# Patient Record
Sex: FEMALE | Race: WHITE | NOT HISPANIC OR LATINO | Employment: PART TIME | URBAN - METROPOLITAN AREA
[De-identification: names, ages, dates, MRNs, and addresses within clinical notes are randomized per-mention and may not be internally consistent; named-entity substitution may affect disease eponyms.]

---

## 2017-01-25 ENCOUNTER — TELEPHONE (OUTPATIENT)
Dept: FAMILY MEDICINE CLINIC | Facility: CLINIC | Age: 62
End: 2017-01-25

## 2017-01-25 NOTE — TELEPHONE ENCOUNTER
----- Message from Corin Ardon sent at 1/25/2017  4:21 PM EST -----  Contact: [phone]   PT SAYS SHE HAS NOT SMOKED SINCE 1998 BUT HER RECORDS IN THIS OFFICE PER HER INSURANCE COMPANY SAYS SHE SMOKES A PACK A DAY.  PATIENT SAYS SHE NEEDS THIS CORRECTED IN HER RECORDS AND TO HAVE THE PROVIDER  CONTACT HER

## 2017-06-21 ENCOUNTER — OFFICE VISIT (OUTPATIENT)
Dept: FAMILY MEDICINE CLINIC | Facility: CLINIC | Age: 62
End: 2017-06-21

## 2017-06-21 VITALS
HEIGHT: 69 IN | DIASTOLIC BLOOD PRESSURE: 74 MMHG | OXYGEN SATURATION: 98 % | BODY MASS INDEX: 26.66 KG/M2 | WEIGHT: 180 LBS | TEMPERATURE: 98.4 F | HEART RATE: 92 BPM | SYSTOLIC BLOOD PRESSURE: 130 MMHG

## 2017-06-21 DIAGNOSIS — L03.313 CELLULITIS OF CHEST WALL: Primary | ICD-10-CM

## 2017-06-21 PROCEDURE — 99213 OFFICE O/P EST LOW 20 MIN: CPT | Performed by: FAMILY MEDICINE

## 2017-06-21 RX ORDER — AMOXICILLIN AND CLAVULANATE POTASSIUM 875; 125 MG/1; MG/1
1 TABLET, FILM COATED ORAL 2 TIMES DAILY
Qty: 14 TABLET | Refills: 0
Start: 2017-06-21 | End: 2017-06-28

## 2017-06-21 NOTE — PROGRESS NOTES
"Subjective   Inez Ladd is a 62 y.o. female.     History of Present Illness   The patient presents today with c/o insect bites of the right upper chest with redness and sone yellow drainage.  She describes these as chiggers.  The redness and soreness around each developed after scratching the central area.  She has since developed a small amount of yellow exudate and surrounding erythema.  No fever or chills.        The following portions of the patient's history were reviewed and updated as appropriate: allergies, current medications, past social history and problem list.    Review of Systems   Constitutional: Negative for chills and fever.   Skin: Positive for color change, rash and wound.       Objective   /74  Pulse 92  Temp 98.4 °F (36.9 °C)  Ht 69\" (175.3 cm)  Wt 180 lb (81.6 kg)  SpO2 98%  BMI 26.58 kg/m2  Physical Exam   Constitutional: She appears well-developed and well-nourished.   Skin:   2 areas of erythema above the right breast.  Each of these are about 2 or 3 inches in diameter and have a centrally excoriated area at the center of each.   Nursing note and vitals reviewed.      Assessment/Plan   Problem List Items Addressed This Visit     None      Visit Diagnoses     Cellulitis of chest wall    -  Primary    right upper chest                  Rx for Augmentin 875 mg twice a day for 7 days.    Follow up if not improving or worsens.      Scribed for Dr Jose Culp by Sujata Kwan CMA.    "

## 2018-02-19 ENCOUNTER — OFFICE VISIT (OUTPATIENT)
Dept: FAMILY MEDICINE CLINIC | Facility: CLINIC | Age: 63
End: 2018-02-19

## 2018-02-19 VITALS
OXYGEN SATURATION: 98 % | WEIGHT: 185 LBS | HEART RATE: 86 BPM | BODY MASS INDEX: 27.4 KG/M2 | HEIGHT: 69 IN | SYSTOLIC BLOOD PRESSURE: 124 MMHG | DIASTOLIC BLOOD PRESSURE: 76 MMHG | RESPIRATION RATE: 16 BRPM | TEMPERATURE: 98.5 F

## 2018-02-19 DIAGNOSIS — H69.82 DYSFUNCTION OF LEFT EUSTACHIAN TUBE: ICD-10-CM

## 2018-02-19 DIAGNOSIS — S83.412A SPRAIN OF MEDIAL COLLATERAL LIGAMENT OF LEFT KNEE, INITIAL ENCOUNTER: Primary | ICD-10-CM

## 2018-02-19 PROCEDURE — 99213 OFFICE O/P EST LOW 20 MIN: CPT | Performed by: FAMILY MEDICINE

## 2018-02-19 RX ORDER — FLUTICASONE PROPIONATE 50 MCG
2 SPRAY, SUSPENSION (ML) NASAL DAILY
Qty: 1 BOTTLE | Refills: 2 | Status: SHIPPED | OUTPATIENT
Start: 2018-02-19 | End: 2019-02-06

## 2018-02-19 NOTE — PROGRESS NOTES
"Subjective   Inez Ladd is a 63 y.o. female.     Knee Pain    The incident occurred 3 to 5 days ago. The incident occurred at home. The injury mechanism was a direct blow (dog jumped out of the car and landed on the left knee). The pain is present in the left knee (back of the left knee). The quality of the pain is described as aching. The pain is moderate. The pain has been improving since onset. Pertinent negatives include no inability to bear weight. She reports no foreign bodies present. The symptoms are aggravated by movement. She has tried NSAIDs for the symptoms. The treatment provided mild relief.   Ear Fullness    There is pain in the left ear. This is a new problem. The current episode started in the past 7 days. There has been no fever. Associated symptoms include hearing loss (mild). Pertinent negatives include no abdominal pain or ear discharge. She has tried nothing for the symptoms.        The following portions of the patient's history were reviewed and updated as appropriate: allergies, current medications, past social history and problem list.    Review of Systems   HENT: Positive for hearing loss (mild). Negative for ear discharge.    Gastrointestinal: Negative for abdominal pain.       Objective   /76  Pulse 86  Temp 98.5 °F (36.9 °C)  Resp 16  Ht 175.3 cm (69\")  Wt 83.9 kg (185 lb)  SpO2 98%  BMI 27.32 kg/m2  Physical Exam   HENT:   Fluid behind TM   Pulmonary/Chest: She has no wheezes. She has no rales.   Musculoskeletal:   Exam of knee reveals tenderness along medial aspect of kneec joint.  Joint stable. No effusioin.       Assessment/Plan   Problem List Items Addressed This Visit     None      Visit Diagnoses     Sprain of medial collateral ligament of left knee, initial encounter    -  Primary    Dysfunction of left eustachian tube                  Drink plenty fluids.  Do straight leg exercises 10 times twice a day.Also some straight leg stretching exercises.    Stay off " work for the next two days. OK to Return to work on Wednesday if improving.    Take the Motrin 3 tablet two to three times a day for the next 3 days.    Use Flonase nasal spray 2 sprays in each nostril daily.    Follow up as needed.              Scribed for Dr Jose Culp by Sujata Kwan CMA.          I, Jose Culp MD, personally performed the services described in this documentation, as scribed by Sujata Kwan in my presence, and is both accurate and complete.

## 2018-02-21 ENCOUNTER — OFFICE VISIT (OUTPATIENT)
Dept: FAMILY MEDICINE CLINIC | Facility: CLINIC | Age: 63
End: 2018-02-21

## 2018-02-21 VITALS
BODY MASS INDEX: 27.4 KG/M2 | DIASTOLIC BLOOD PRESSURE: 68 MMHG | WEIGHT: 185 LBS | OXYGEN SATURATION: 99 % | SYSTOLIC BLOOD PRESSURE: 120 MMHG | HEIGHT: 69 IN | HEART RATE: 87 BPM

## 2018-02-21 DIAGNOSIS — S83.412D SPRAIN OF MEDIAL COLLATERAL LIGAMENT OF LEFT KNEE, SUBSEQUENT ENCOUNTER: Primary | ICD-10-CM

## 2018-02-21 PROBLEM — S83.412A SPRAIN OF MEDIAL COLLATERAL LIGAMENT OF LEFT KNEE: Status: ACTIVE | Noted: 2018-02-21

## 2018-02-21 PROCEDURE — 99213 OFFICE O/P EST LOW 20 MIN: CPT | Performed by: FAMILY MEDICINE

## 2018-02-21 NOTE — PROGRESS NOTES
"Subjective   Inez Ladd is a 63 y.o. female.     History of Present Illness   The patient is here for a follow up on left knee pain.    She states the pain is now in the front of the knee. Also still some tightness in the back of the knee.    She is unable to walk for long distance without pain.    The following portions of the patient's history were reviewed and updated as appropriate: allergies, current medications, past social history and problem list.    Review of Systems   Respiratory: Negative for shortness of breath.    Cardiovascular: Negative for chest pain.   Musculoskeletal: Positive for arthralgias (left knee).       Objective   /68  Pulse 87  Ht 175.3 cm (69.02\")  Wt 83.9 kg (185 lb)  SpO2 99%  BMI 27.31 kg/m2  Physical Exam   Constitutional: She appears well-developed and well-nourished.   Musculoskeletal:   Emanation of the left knee reveals no effusion.  There is some tenderness along the medial aspect of the knee joint which extends into the distal medial thigh and also the proximal medial calf.   Neurological: She has normal reflexes.   Nursing note and vitals reviewed.      Assessment/Plan   Problem List Items Addressed This Visit        Musculoskeletal and Integument    Sprain of medial collateral ligament of left knee - Primary              Drink plenty fluids.  Stay off work until Monday.    Continue Ibuprofen as doing.  Do some straight leg raising exercises twice a day.    Follow up as needed.          Scribed for Dr Jose Culp by Sujata Kwan CMA.          I, Jose Culp MD, personally performed the services described in this documentation, as scribed by Sujata Kwan in my presence, and is both accurate and complete.    "

## 2018-02-26 ENCOUNTER — OFFICE VISIT (OUTPATIENT)
Dept: FAMILY MEDICINE CLINIC | Facility: CLINIC | Age: 63
End: 2018-02-26

## 2018-02-26 ENCOUNTER — HOSPITAL ENCOUNTER (OUTPATIENT)
Dept: GENERAL RADIOLOGY | Facility: HOSPITAL | Age: 63
Discharge: HOME | End: 2018-02-26
Attending: FAMILY MEDICINE | Admitting: FAMILY MEDICINE

## 2018-02-26 VITALS
HEART RATE: 85 BPM | OXYGEN SATURATION: 99 % | HEIGHT: 69 IN | BODY MASS INDEX: 27.4 KG/M2 | SYSTOLIC BLOOD PRESSURE: 138 MMHG | DIASTOLIC BLOOD PRESSURE: 80 MMHG | WEIGHT: 185 LBS

## 2018-02-26 DIAGNOSIS — S76.312D HAMSTRING MUSCLE STRAIN, LEFT, SUBSEQUENT ENCOUNTER: ICD-10-CM

## 2018-02-26 DIAGNOSIS — S76.312D HAMSTRING MUSCLE STRAIN, LEFT, SUBSEQUENT ENCOUNTER: Primary | ICD-10-CM

## 2018-02-26 PROCEDURE — 73560 X-RAY EXAM OF KNEE 1 OR 2: CPT

## 2018-02-26 PROCEDURE — 99213 OFFICE O/P EST LOW 20 MIN: CPT | Performed by: FAMILY MEDICINE

## 2018-03-08 ENCOUNTER — OFFICE VISIT (OUTPATIENT)
Dept: FAMILY MEDICINE CLINIC | Facility: CLINIC | Age: 63
End: 2018-03-08

## 2018-03-08 VITALS
HEART RATE: 91 BPM | DIASTOLIC BLOOD PRESSURE: 66 MMHG | SYSTOLIC BLOOD PRESSURE: 122 MMHG | HEIGHT: 69 IN | BODY MASS INDEX: 27.4 KG/M2 | OXYGEN SATURATION: 98 % | WEIGHT: 185 LBS

## 2018-03-08 DIAGNOSIS — M76.892 HAMSTRING TENDINITIS OF LEFT THIGH: ICD-10-CM

## 2018-03-08 DIAGNOSIS — S83.412D SPRAIN OF MEDIAL COLLATERAL LIGAMENT OF LEFT KNEE, SUBSEQUENT ENCOUNTER: Primary | ICD-10-CM

## 2018-03-08 PROCEDURE — 99213 OFFICE O/P EST LOW 20 MIN: CPT | Performed by: FAMILY MEDICINE

## 2018-03-08 RX ORDER — TRAMADOL HYDROCHLORIDE 50 MG/1
TABLET ORAL
Refills: 0 | COMMUNITY
Start: 2018-02-26 | End: 2018-04-16

## 2018-03-08 NOTE — PROGRESS NOTES
"Subjective   Inez Ladd is a 63 y.o. female.     History of Present Illness   The patient is here for a follow up on Left Hamstring muscle strain and left knee pain.  She was initially evaluated here at the office on February 19.  X-rays of the left knee revealed no bony abnormalities.      States she is doing physical therapy at [facility].  She is being treated for a strain of the medial collateral ligament as well as some tendinitis of the hamstrings medially.  States this is helping some. Still hurts with walking. Still some swelling and tightness in the knee. Improves with massage.  Denies any chest pain or shortness of breath    The following portions of the patient's history were reviewed and updated as appropriate: allergies, current medications, past social history and problem list.    Review of Systems   Respiratory: Negative for shortness of breath.    Cardiovascular: Negative for chest pain.   Musculoskeletal: Positive for arthralgias (left knee).       Objective   /66  Pulse 91  Ht 175.3 cm (69.02\")  Wt 83.9 kg (185 lb)  SpO2 98%  BMI 27.31 kg/m2  Physical Exam   Constitutional: She appears well-developed and well-nourished.   Musculoskeletal:   Semination of the left knee reveals no effusion.  There is no joint line tenderness.  There is some mild tenderness of the distal medial hamstring tendon with some tightness of that tissue.  There is some mild tenderness along the medial aspect of the left knee consistent with the collateral ligament.  The joint is stable.   Nursing note and vitals reviewed.      Assessment/Plan   Problem List Items Addressed This Visit        Musculoskeletal and Integument    Sprain of medial collateral ligament of left knee - Primary      Other Visit Diagnoses     Hamstring tendinitis of left thigh          She still has pain with ambulation.  This is gradually improving with physical therapy and the passage of time.  As she was able to walk at 200 yards but " then had to sit down and rest.  We will have her continue receiving physical therapy 2 or 3 days a week.  I will have her stay off work through March 16 and then can return to part-time work after that.  Part-time work would consist of working 6 hours instead of 12 hours and a shift and she would do this on an every other day basis.  She will continue with physical therapy through the end of March.  We will have her return to see us on the 30th day of March.  Reevaluate at that point.  It is anticipated that she'll be able to return to full-time work after March 31.        Drink plenty fluids.    Continue medications as doing.    Stay off work for one more week then try half shifts every other day for two weeks then go back to full time after April first.    Follow up on March 30.              Scribed for Dr Jose Culp by Sujata Kwan CMA.

## 2018-03-19 ENCOUNTER — OFFICE VISIT (OUTPATIENT)
Dept: FAMILY MEDICINE CLINIC | Facility: CLINIC | Age: 63
End: 2018-03-19

## 2018-03-19 VITALS
HEART RATE: 94 BPM | BODY MASS INDEX: 27.4 KG/M2 | HEIGHT: 69 IN | DIASTOLIC BLOOD PRESSURE: 92 MMHG | SYSTOLIC BLOOD PRESSURE: 140 MMHG | OXYGEN SATURATION: 99 % | WEIGHT: 185 LBS

## 2018-03-19 DIAGNOSIS — M25.562 ACUTE PAIN OF LEFT KNEE: Primary | ICD-10-CM

## 2018-03-19 PROCEDURE — 99213 OFFICE O/P EST LOW 20 MIN: CPT | Performed by: FAMILY MEDICINE

## 2018-03-19 NOTE — PROGRESS NOTES
"Subjective   Inez Ladd is a 63 y.o. female.     History of Present Illness   The patient is here today for a follow up on left knee pain.    States she is now having a sharp pain ( 8 out or 10 on pain scale) in the medial left knee. Worse with movement and walking. Tramadol 50 mg every 6 hours has helped. States the tightness in the back of the knee has resolved.  Denies any chest pain or shortness of breath.    The following portions of the patient's history were reviewed and updated as appropriate: allergies, current medications, past social history and problem list.    Review of Systems    Objective   /92   Pulse 94   Ht 175.3 cm (69.02\")   Wt 83.9 kg (185 lb)   SpO2 99%   BMI 27.31 kg/m²   Physical Exam   Constitutional: She appears well-developed and well-nourished.   Musculoskeletal: She exhibits tenderness (left medial collateral ligamentleft medial collateral ligament).   Examination of the left knee reveals very small effusion.  There is no erythema externally.  There is point tenderness at the medial joint line of the left knee.  The patient has good range of motion with flexion and extension.  There is no locking or slippage.  The knee is stable on exam.   Nursing note and vitals reviewed.      Assessment/Plan   Problem List Items Addressed This Visit     None      Visit Diagnoses     Acute pain of left knee    -  Primary    rule out medial collateral tear        The patient has developed recurring pain of the left knee that seems to be quite severe.  She has pain with ambulation.  The location of the pain suggests the possibility of a medial meniscal tear.  I will refer her to orthopedics for further evaluation to see if MRI can be obtained to rule out the possibility of a cartilaginous injury.  I also filled out today FMLA form for her to be off work thru at least April 15.        Drink plenty fluids.    Use the Motrin 2 tablets 3 times a day with food and Tramadol 50 mg 2-3 times a day " as needed.    Refer to Orthopedics. Dr Jose Calhoun.    Follow up as needed.                Scribed for Dr Jose Culp by Sujata Kwan CMA.          I, Jose Culp MD, personally performed the services described in this documentation, as scribed by Sujata Kwan in my presence, and is both accurate and complete.

## 2018-03-23 ENCOUNTER — OFFICE VISIT (OUTPATIENT)
Dept: ORTHOPEDIC SURGERY | Facility: CLINIC | Age: 63
End: 2018-03-23

## 2018-03-23 VITALS
DIASTOLIC BLOOD PRESSURE: 84 MMHG | SYSTOLIC BLOOD PRESSURE: 147 MMHG | BODY MASS INDEX: 27.4 KG/M2 | HEIGHT: 69 IN | WEIGHT: 184.97 LBS | HEART RATE: 80 BPM

## 2018-03-23 DIAGNOSIS — S76.312A HAMSTRING MUSCLE STRAIN, LEFT, INITIAL ENCOUNTER: Primary | ICD-10-CM

## 2018-03-23 PROCEDURE — 99204 OFFICE O/P NEW MOD 45 MIN: CPT | Performed by: ORTHOPAEDIC SURGERY

## 2018-03-23 RX ORDER — IBUPROFEN 400 MG/1
400 TABLET ORAL EVERY 6 HOURS PRN
COMMUNITY
End: 2018-04-27

## 2018-03-23 NOTE — PROGRESS NOTES
[facility] [place] [facility] Note    Subjective     Chief Complaint   Patient presents with   • Left Knee - Pain     Injury 2/15/18 when her dog slammed into her knee. Got better but increased on 3/17/18.        HPI      Inez Ladd is a 63 y.o. female. She fell and injured her knee February 15, 2018.  Then got worse March 15 and went to the ER.  She is on crutches taking Motrin.  Her pain is 9 out of 10 and the medial aspect of her knee.  She is a nurse at [employer].  She's tried anti-inflammatories and physical therapy.        Past Medical History:   Diagnosis Date   • Acute cystitis    • Acute pharyngitis    • Body aches    • Cellulitis    • Dysuria    • H/O bladder infections     2 SINCE AUGUST, urinary tract infection   • History of prolapse of bladder      Bladder disorder   • Impacted cerumen     Impacted cerumen, unspecified ear    • Infected insect bite of forearm    • Menopause    • Prophylactic immunotherapy       Past Surgical History:   Procedure Laterality Date   • NO PAST SURGERIES        Family History   Problem Relation Age of Onset   • Coronary artery disease Other    • Coronary artery disease Mother    • Hypertension Mother      Social History     Social History   • Marital status:      Spouse name: N/A   • Number of children: N/A   • Years of education: N/A     Occupational History   • Nurse      [employer]     Social History Main Topics   • Smoking status: Former Smoker     Types: Cigarettes     Quit date: 1/1/1998   • Smokeless tobacco: Never Used   • Alcohol use Yes      Comment: Occasional alcohol use    • Drug use: No   • Sexual activity: Defer     Other Topics Concern   • Not on file     Social History Narrative   • No narrative on file      Current Outpatient Prescriptions on File Prior to Visit   Medication Sig Dispense Refill   • fluticasone (FLONASE) 50 MCG/ACT nasal spray 2 sprays into each nostril Daily. Administer 2 sprays in each nostril for each dose. 1  "bottle 2   • traMADol (ULTRAM) 50 MG tablet 1 TABLET 4 TIMES A DAY AS NEEDED FOR PAIN  0     No current facility-administered medications on file prior to visit.       No Known Allergies     The following portions of the patient's history were reviewed and updated as appropriate: allergies, current medications, past family history, past medical history, past social history, past surgical history and problem list.    Review of Systems   Constitutional: Positive for activity change.   HENT: Positive for hearing loss and sinus pressure.    Eyes: Positive for pain.   Respiratory: Negative.    Cardiovascular: Negative.    Gastrointestinal: Negative.    Endocrine: Negative.    Genitourinary: Positive for urgency.   Musculoskeletal: Positive for arthralgias.   Skin: Negative.    Allergic/Immunologic: Negative.    Neurological: Negative.    Hematological: Negative.    Psychiatric/Behavioral: Negative.         Objective      Physical Exam  /84   Pulse 80   Ht 175.3 cm (69.02\")   Wt 83.9 kg (184 lb 15.5 oz)   BMI 27.30 kg/m²     Body mass index is 27.3 kg/m².        GENERAL APPEARANCE: awake, alert & oriented x 3, in no acute distress and well developed, well nourished  PSYCH: normal mood and affect  LUNGS:  breathing nonlabored, no wheezing  EYES: sclera anicteric, pupils equal  CARDIOVASCULAR: palpable pulses dorsalis pedis, palpable posterior tibial bilaterally. Capillary refill less than 2 seconds  INTEGUMENTARY: skin intact, no clubbing, cyanosis  NEUROLOGIC:  abnormal gait and balance, on crutches             Ortho Exam  Peripheral Vascular:    Upper Extremity:   Inspection:  Left--no cyanotic nail beds Right--no cyanotic nail beds   Bilateral:  Pink nail beds with brisk capillary refill   Palpation:  Bilateral radial pulse normal    Musculoskeletal:  Global Assessment:  Overall assessment of Lower Extremity Muscle Strength and Tone:  Left quadriceps--5/5   Left hamstrings--5/5       Left tibialis " anterior--5/5  Left gastroc-soleus--5/5  Left EHL--5/5      Lower Extremity:  Knee/Patella:  No digital clubbing or cyanosis.    Examination of left knee reveals:  Normal deep tendon reflexes, coordination, strength, tone, sensation.  No known fractures or deformities.    Inspection and Palpation:    Left knee: She is unable to bear weight on left knee  Tenderness:  Hamstring tendon insertion with minimal medial joint line tenderness and minimal swelling  Effusion:  none  Crepitus:  none  Pulses:  2+  Ecchymosis:  None  Warmth:  None       ROM:  Right:  Extension:0    Flexion:135  Left:  Extension:0     Flexion:135    Instability:    Left:  Lachman Test:  Negative, Varus stress test negative, Valgus stress test negative   Anterior Drawer Test:  Negative, Posterior Drawer Test:  Negative      Deformities/Malalignments/Discrepancies:    Left:  none  Right:  none    Functional Testing:    Left:  Wilberto's test:  Negative  Patella grind test:  Negative  Q-angle:  Normal  Apprehension Sign:  Negative    Imaging/Studies  Imaging Results (last 7 days)     ** No results found for the last 168 hours. **      I reviewed the x-rays left knee and they're negative.    Assessment/Plan        ICD-10-CM ICD-9-CM   1. Hamstring muscle strain, left, initial encounter S76.312A 843.8       Orders Placed This Encounter   Procedures   • MRI Knee Left Without Contrast   • Ambulatory Referral to Physical Therapy    She will continue the anti-inflammatories and crutches.  She will continue physical therapy and we will get an MRI.  I will see her back after the MRI.  She is unable to bear weight and has giving way of the left knee.  She's failed physical therapy so far.    Medical Decision Making  Management Options : over-the-counter medicine and physical/occupational therapy  Data/Risk: radiology tests and independent visualization of imaging, lab tests, or EMG/NCV    Jose Calhoun MD  03/23/18  9:13 AM

## 2018-03-27 ENCOUNTER — HOSPITAL ENCOUNTER (OUTPATIENT)
Dept: MRI IMAGING | Facility: HOSPITAL | Age: 63
Discharge: HOME | End: 2018-03-27
Attending: ORTHOPAEDIC SURGERY | Admitting: ORTHOPAEDIC SURGERY

## 2018-03-27 DIAGNOSIS — S76.312A HAMSTRING MUSCLE STRAIN, LEFT, INITIAL ENCOUNTER: ICD-10-CM

## 2018-03-27 PROCEDURE — 73721 MRI JNT OF LWR EXTRE W/O DYE: CPT

## 2018-03-30 ENCOUNTER — OFFICE VISIT (OUTPATIENT)
Dept: ORTHOPEDIC SURGERY | Facility: CLINIC | Age: 63
End: 2018-03-30

## 2018-03-30 ENCOUNTER — TELEPHONE (OUTPATIENT)
Dept: ORTHOPEDIC SURGERY | Facility: CLINIC | Age: 63
End: 2018-03-30

## 2018-03-30 VITALS
WEIGHT: 183.86 LBS | DIASTOLIC BLOOD PRESSURE: 91 MMHG | HEART RATE: 79 BPM | SYSTOLIC BLOOD PRESSURE: 154 MMHG | HEIGHT: 69 IN | BODY MASS INDEX: 27.23 KG/M2

## 2018-03-30 DIAGNOSIS — T14.8XXA BONE BRUISE: Primary | ICD-10-CM

## 2018-03-30 DIAGNOSIS — S83.232D COMPLEX TEAR OF MEDIAL MENISCUS OF LEFT KNEE AS CURRENT INJURY, SUBSEQUENT ENCOUNTER: ICD-10-CM

## 2018-03-30 DIAGNOSIS — S83.412D SPRAIN OF MEDIAL COLLATERAL LIGAMENT OF LEFT KNEE, SUBSEQUENT ENCOUNTER: ICD-10-CM

## 2018-03-30 PROCEDURE — 99213 OFFICE O/P EST LOW 20 MIN: CPT | Performed by: ORTHOPAEDIC SURGERY

## 2018-03-30 NOTE — PROGRESS NOTES
[facility] [place] [facility] [place]    Subjective     Chief Complaint   Patient presents with   • Left Knee - Follow-up     1 week f/u post MRI  Hamstring muscle strain, left, initial encounter        HPI      Inez Ladd is a 63 y.o. female.  She had a fall February 15, 2018.  She is here follow-up after the MRI.  She says she slightly better and her pain is 4 out of 10.  She goes to physical therapy where she works a [employer].  She is on anti-inflammatories        Past Medical History:   Diagnosis Date   • Acute cystitis    • Acute pharyngitis    • Body aches    • Cellulitis    • Dysuria    • H/O bladder infections     2 SINCE AUGUST, urinary tract infection   • History of prolapse of bladder      Bladder disorder   • Impacted cerumen     Impacted cerumen, unspecified ear    • Infected insect bite of forearm    • Menopause    • Prophylactic immunotherapy       Past Surgical History:   Procedure Laterality Date   • NO PAST SURGERIES        Family History   Problem Relation Age of Onset   • Coronary artery disease Other    • Coronary artery disease Mother    • Hypertension Mother    • No Known Problems Father      Social History     Social History   • Marital status:      Spouse name: N/A   • Number of children: N/A   • Years of education: N/A     Occupational History   • Nurse      [employer]     Social History Main Topics   • Smoking status: Former Smoker     Types: Cigarettes     Quit date: 1/1/1998   • Smokeless tobacco: Never Used   • Alcohol use Yes      Comment: Occasional alcohol use    • Drug use: No   • Sexual activity: Defer     Other Topics Concern   • Not on file     Social History Narrative   • No narrative on file      Current Outpatient Prescriptions on File Prior to Visit   Medication Sig Dispense Refill   • fluticasone (FLONASE) 50 MCG/ACT nasal spray 2 sprays into each nostril Daily. Administer 2 sprays in each nostril for each dose. 1 bottle 2   • ibuprofen (ADVIL,MOTRIN)  "400 MG tablet Take 400 mg by mouth Every 6 (Six) Hours As Needed for Mild Pain .     • traMADol (ULTRAM) 50 MG tablet 1 TABLET 4 TIMES A DAY AS NEEDED FOR PAIN  0     No current facility-administered medications on file prior to visit.       No Known Allergies     The following portions of the patient's history were reviewed and updated as appropriate: allergies, current medications, past family history, past medical history, past social history, past surgical history and problem list.    Review of Systems   Constitutional: Positive for activity change.   HENT: Positive for hearing loss.    Eyes: Negative.    Respiratory: Negative.    Cardiovascular: Negative.    Gastrointestinal: Negative.    Endocrine: Negative.    Genitourinary: Positive for urgency.   Musculoskeletal: Positive for arthralgias.   Skin: Negative.    Allergic/Immunologic: Negative.    Neurological: Positive for headaches.   Hematological: Negative.    Psychiatric/Behavioral: Negative.         Objective      Physical Exam  /91   Pulse 79   Ht 175.3 cm (69.02\")   Wt 83.4 kg (183 lb 13.8 oz)   BMI 27.14 kg/m²     Body mass index is 27.14 kg/m².        GENERAL APPEARANCE: awake, alert & oriented x 3, in no acute distress and well developed, well nourished  PSYCH: normal mood and affect      Ortho Exam  Peripheral Vascular:    Upper Extremity:   Inspection:  Left--no cyanotic nail beds Right--no cyanotic nail beds   Bilateral:  Pink nail beds with brisk capillary refill   Palpation:  Bilateral radial pulse normal    Musculoskeletal:  Global Assessment:  Overall assessment of Lower Extremity Muscle Strength and Tone:  Left quadriceps--5/5   Left hamstrings--5/5       Left tibialis anterior--5/5  Left gastroc-soleus--5/5  Left EHL--5/5      Lower Extremity:  Knee/Patella:  No digital clubbing or cyanosis.    Examination of left knee reveals:  Normal deep tendon reflexes, coordination, strength, tone, sensation.  No known fractures or " deformities.    Inspection and Palpation:    Left knee:  Tenderness:  Medial joint line  Effusion:  none  Crepitus:  none  Pulses:  2+  Ecchymosis:  None  Warmth:  None       ROM:  Right:  Extension:0    Flexion:135  Left:  Extension:0     Flexion:135    Instability:    Left:  Lachman Test:  Negative, Varus stress test negative, Valgus stress test with pain but no instability   Anterior Drawer Test:  Negative, Posterior Drawer Test:  Negative      Deformities/Malalignments/Discrepancies:    Left:  none  Right:  none    Functional Testing:    Left:  Wilberto's test: Positive  Patella grind test:  Negative  Q-angle:  Normal  Apprehension Sign:  Negative    Imaging/Studies  Imaging Results (last 7 days)     ** No results found for the last 168 hours. **      I reviewed the MRI which shows a medial tibial bone bruise medial meniscus tear and MCL sprain    Assessment/Plan        ICD-10-CM ICD-9-CM   1. Bone bruise T14.8XXA 924.9   2. Complex tear of medial meniscus of left knee as current injury, subsequent encounter S83.232D V58.89   3. Sprain of medial collateral ligament of left knee, subsequent encounter S83.412D V58.89     844.1       Orders Placed This Encounter   Procedures   • Ambulatory Referral to Physical Therapy        She will continue physical therapy.  She'll continue restrictions seated job only or off work.  She will follow-up in 2 weeks.    Medical Decision Making  Management Options : over-the-counter medicine and physical/occupational therapy  Data/Risk: radiology tests and independent visualization of imaging, lab tests, or EMG/NCV    Jose Calhoun MD  03/30/18  8:27 AM

## 2018-04-09 ENCOUNTER — TELEPHONE (OUTPATIENT)
Dept: FAMILY MEDICINE CLINIC | Facility: CLINIC | Age: 63
End: 2018-04-09

## 2018-04-09 NOTE — TELEPHONE ENCOUNTER
----- Message from Darlene Wills Rep sent at 4/9/2018 10:38 AM EDT -----  Regarding: FORMS  Contact: [phone]  PT DROPPED OFF A PACKET OF PAPERS, ONE BEING ATTENDING PHYSICIAN FORM, LAST WEEK. SHE WOULD LIKE TO KNOW IF THEY HAVE BEEN DONE AND SENT BACK TO HER?

## 2018-04-13 ENCOUNTER — OFFICE VISIT (OUTPATIENT)
Dept: ORTHOPEDIC SURGERY | Facility: CLINIC | Age: 63
End: 2018-04-13

## 2018-04-13 VITALS
BODY MASS INDEX: 28.44 KG/M2 | WEIGHT: 192.02 LBS | SYSTOLIC BLOOD PRESSURE: 173 MMHG | DIASTOLIC BLOOD PRESSURE: 87 MMHG | HEIGHT: 69 IN | HEART RATE: 86 BPM

## 2018-04-13 DIAGNOSIS — T14.8XXA BONE BRUISE: Primary | ICD-10-CM

## 2018-04-13 DIAGNOSIS — S83.232D COMPLEX TEAR OF MEDIAL MENISCUS OF LEFT KNEE AS CURRENT INJURY, SUBSEQUENT ENCOUNTER: ICD-10-CM

## 2018-04-13 DIAGNOSIS — S83.412D SPRAIN OF MEDIAL COLLATERAL LIGAMENT OF LEFT KNEE, SUBSEQUENT ENCOUNTER: ICD-10-CM

## 2018-04-13 PROCEDURE — 99212 OFFICE O/P EST SF 10 MIN: CPT | Performed by: ORTHOPAEDIC SURGERY

## 2018-04-13 NOTE — PROGRESS NOTES
[facility] [place] [facility]opa[facility] Surgery Clinic Note    Subjective     Chief Complaint   Patient presents with   • Left Knee - Follow-up     Hamstring muscle strain of (L) knee  2 week f/u         HPI      Inez Ladd is a 63 y.o. female.  She is follow-up left knee injury from February 2018.  Pain is 2 out of 10.  She's tried anti-inflammatories and physical therapy.  She's getting better.        Past Medical History:   Diagnosis Date   • Acute cystitis    • Acute pharyngitis    • Body aches    • Cellulitis    • Dysuria    • H/O bladder infections     2 SINCE AUGUST, urinary tract infection   • History of prolapse of bladder      Bladder disorder   • Impacted cerumen     Impacted cerumen, unspecified ear    • Infected insect bite of forearm    • Menopause    • Prophylactic immunotherapy       Past Surgical History:   Procedure Laterality Date   • NO PAST SURGERIES        Family History   Problem Relation Age of Onset   • Coronary artery disease Other    • Coronary artery disease Mother    • Hypertension Mother    • No Known Problems Father      Social History     Social History   • Marital status:      Spouse name: N/A   • Number of children: N/A   • Years of education: N/A     Occupational History   • 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 Administer 2 sprays in each nostril for each dose. 1 bottle 2   • ibuprofen (ADVIL,MOTRIN) 400 MG tablet Take 400 mg by mouth Every 6 (Six) Hours As Needed for Mild Pain .     •  "traMADol (ULTRAM) 50 MG tablet 1 TABLET 4 TIMES A DAY AS NEEDED FOR PAIN  0     No current facility-administered medications on file prior to visit.       No Known Allergies     The following portions of the patient's history were reviewed and updated as appropriate: allergies, current medications, past family history, past medical history, past social history, past surgical history and problem list.    Review of Systems   Constitutional: Positive for activity change.   HENT: Positive for hearing loss and sinus pressure.    Eyes: Negative.    Respiratory: Negative.    Cardiovascular: Negative.    Gastrointestinal: Negative.    Endocrine: Negative.    Genitourinary: Positive for urgency.   Musculoskeletal: Positive for joint swelling.   Skin: Negative.    Allergic/Immunologic: Negative.    Neurological: Negative.    Hematological: Negative.    Psychiatric/Behavioral: Negative.         Objective      Physical Exam  /87   Pulse 86   Ht 175.3 cm (69.02\")   Wt 87.1 kg (192 lb 0.3 oz)   BMI 28.34 kg/m²     Body mass index is 28.34 kg/m².        GENERAL APPEARANCE: awake, alert & oriented x 3, in no acute distress and well developed, well nourished  PSYCH: normal mood and affect      Ortho Exam  Peripheral Vascular:    Upper Extremity:   Inspection:  Left--no cyanotic nail beds Right--no cyanotic nail beds   Bilateral:  Pink nail beds with brisk capillary refill   Palpation:  Bilateral radial pulse normal    Musculoskeletal:  Global Assessment:  Overall assessment of Lower Extremity Muscle Strength and Tone:  Left quadriceps--5/5   Left hamstrings--5/5       Left tibialis anterior--5/5  Left gastroc-soleus--5/5  Left EHL --5/5    Lower Extremity:  Knee/Patella:  No digital clubbing or cyanosis.    Examination of left knee reveals:  Normal deep tendon reflexes, coordination, strength, tone, sensation.  No known fractures or deformities.    Inspection and Palpation:  Left knee:  Tenderness:  Over the medial joint " line and moderate severity  Effusion:  none  Crepitus:  Positive  Pulses:  2+  Ecchymosis:  None  Warmth:  None     ROM:  Right:  Extension:5    Flexion:120  Left:  Extension:5     Flexion:120    Instability:    Left:  Lachman Test:  Negative, Varus stress test negative, Valgus stress test negative    Deformities/Malalignments/Discrepancies:    Left:  Genu Varum   Right:  No deformity    Functional Testing:  Wilberto's test:  Negative  Patella grind test:  Positive  Q-angle:  normal        Assessment/Plan        ICD-10-CM ICD-9-CM   1. Bone bruise T14.8XXA 924.9   2. Complex tear of medial meniscus of left knee as current injury, subsequent encounter S83.232D V58.89   3. Sprain of medial collateral ligament of left knee, subsequent encounter S83.412D V58.89     844.1       Orders Placed This Encounter   Procedures   • Ambulatory Referral to Physical Therapy        She'll continue physical therapy.  She'll continue work restrictions of seated job only.  She'll follow-up in 2 weeks.    Medical Decision Making  Management Options : over-the-counter medicine and physical/occupational therapy    Jose Calhoun MD  04/13/18  8:11 AM

## 2018-04-16 ENCOUNTER — OFFICE VISIT (OUTPATIENT)
Dept: FAMILY MEDICINE CLINIC | Facility: CLINIC | Age: 63
End: 2018-04-16

## 2018-04-16 VITALS
TEMPERATURE: 98.2 F | OXYGEN SATURATION: 99 % | HEIGHT: 69 IN | SYSTOLIC BLOOD PRESSURE: 144 MMHG | HEART RATE: 85 BPM | BODY MASS INDEX: 28.44 KG/M2 | RESPIRATION RATE: 20 BRPM | WEIGHT: 192 LBS | DIASTOLIC BLOOD PRESSURE: 88 MMHG

## 2018-04-16 DIAGNOSIS — G47.00 INSOMNIA, UNSPECIFIED TYPE: ICD-10-CM

## 2018-04-16 DIAGNOSIS — I10 ESSENTIAL HYPERTENSION: Primary | ICD-10-CM

## 2018-04-16 PROCEDURE — 99213 OFFICE O/P EST LOW 20 MIN: CPT | Performed by: FAMILY MEDICINE

## 2018-04-16 RX ORDER — AMLODIPINE BESYLATE 5 MG/1
5 TABLET ORAL DAILY
Qty: 30 TABLET | Refills: 2 | Status: SHIPPED | OUTPATIENT
Start: 2018-04-16 | End: 2018-05-18 | Stop reason: SDUPTHER

## 2018-04-16 NOTE — PROGRESS NOTES
"Subjective   Inez Ladd is a 63 y.o. female.     History of Present Illness   The patient is here today with c/o high blood pressure.    States she has been having a lot of stress due to leg injury and work issues.  Denies any chest pain or shortness of breath.  Having some sinus headaches and taking Tylenol and sudafed.    BP today is 144/88.  No blood pressure medications at this time.      The following portions of the patient's history were reviewed and updated as appropriate: allergies, current medications, past social history and problem list.    Review of Systems   HENT: Positive for sinus pressure.    Respiratory: Negative for shortness of breath.    Cardiovascular: Negative for chest pain.   Neurological: Positive for headaches.   Psychiatric/Behavioral: Positive for sleep disturbance (insomnia).       Objective   /88   Pulse 85   Temp 98.2 °F (36.8 °C) (Oral)   Resp 20   Ht 175.3 cm (69\")   Wt 87.1 kg (192 lb)   SpO2 99%   BMI 28.35 kg/m²   Physical Exam   Constitutional: She appears well-developed and well-nourished.   Cardiovascular: Normal rate and regular rhythm.    Pulmonary/Chest: Effort normal and breath sounds normal.   Nursing note and vitals reviewed.      Assessment/Plan   Problem List Items Addressed This Visit     None      Visit Diagnoses     Essential hypertension    -  Primary    Insomnia, unspecified type                  Drink plenty fluids.  Avoid NSAID's and Sudafed. Use Tylenol instead.    Use OTC Zyrtec or Claritin as needed.    Add Amlodipine 5 mg each morning #30+2.    Follow up in 4 weeks.            Scribed for Dr Jose Culp by Sujata Kwan CMA.          I, Jose Culp MD, personally performed the services described in this documentation, as scribed by Sujata Kwan in my presence, and is both accurate and complete.      "

## 2018-04-27 ENCOUNTER — OFFICE VISIT (OUTPATIENT)
Dept: ORTHOPEDIC SURGERY | Facility: CLINIC | Age: 63
End: 2018-04-27

## 2018-04-27 VITALS
BODY MASS INDEX: 27.95 KG/M2 | HEIGHT: 69 IN | DIASTOLIC BLOOD PRESSURE: 75 MMHG | WEIGHT: 188.71 LBS | SYSTOLIC BLOOD PRESSURE: 141 MMHG | HEART RATE: 84 BPM

## 2018-04-27 DIAGNOSIS — S83.412D SPRAIN OF MEDIAL COLLATERAL LIGAMENT OF LEFT KNEE, SUBSEQUENT ENCOUNTER: ICD-10-CM

## 2018-04-27 DIAGNOSIS — S83.232D COMPLEX TEAR OF MEDIAL MENISCUS OF LEFT KNEE AS CURRENT INJURY, SUBSEQUENT ENCOUNTER: ICD-10-CM

## 2018-04-27 DIAGNOSIS — T14.8XXA BONE BRUISE: Primary | ICD-10-CM

## 2018-04-27 PROCEDURE — 99212 OFFICE O/P EST SF 10 MIN: CPT | Performed by: ORTHOPAEDIC SURGERY

## 2018-04-27 RX ORDER — ACETAMINOPHEN 325 MG/1
650 TABLET ORAL AS NEEDED
COMMUNITY
End: 2019-08-28

## 2018-04-27 NOTE — PROGRESS NOTES
[facility] [place] Orthopaedic Surgery Clinic Note    Subjective     Chief Complaint   Patient presents with   • Left Knee - Follow-up     2 week f/u  Hamstring muscle strain of (L) knee; bone bruise        HPI      Inez Ladd is a 63 y.o. female. She is follow-up left injury from February 2018.  Her pain is 2 out of 10.  She says she is better.  She goes to physical therapy.    Past Medical History:   Diagnosis Date   • Acute cystitis    • Acute pharyngitis    • Body aches    • Cellulitis    • Dysuria    • H/O bladder infections     2 SINCE AUGUST, urinary tract infection   • History of prolapse of bladder      Bladder disorder   • Impacted cerumen     Impacted cerumen, unspecified ear    • Infected insect bite of forearm    • Menopause    • Prophylactic immunotherapy       Past Surgical History:   Procedure Laterality Date   • NO PAST SURGERIES        Family History   Problem Relation Age of Onset   • Coronary artery disease Other    • Coronary artery disease Mother    • Hypertension Mother    • No Known Problems Father      Social History     Social History   • Marital status:      Spouse name: N/A   • Number of children: N/A   • Years of education: N/A     Occupational History   • 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 30 tablet 2   • fluticasone (FLONASE) 50 MCG/ACT nasal spray 2 sprays into each nostril Daily. Administer 2 sprays in each nostril for each dose. 1 bottle 2   • [DISCONTINUED] ibuprofen (ADVIL,MOTRIN) 400 MG tablet  "Take 400 mg by mouth Every 6 (Six) Hours As Needed for Mild Pain .       No current facility-administered medications on file prior to visit.       No Known Allergies     The following portions of the patient's history were reviewed and updated as appropriate: allergies, current medications, past family history, past medical history, past social history, past surgical history and problem list.    Review of Systems   Constitutional: Positive for activity change.   HENT: Positive for hearing loss.    Eyes: Negative.    Respiratory: Negative.    Cardiovascular: Negative.    Gastrointestinal: Negative.    Endocrine: Negative.    Genitourinary: Positive for urgency.   Musculoskeletal: Positive for arthralgias.   Skin: Negative.    Allergic/Immunologic: Negative.    Neurological: Positive for headaches.   Hematological: Negative.    Psychiatric/Behavioral: Negative.         Objective      Physical Exam  /75   Pulse 84   Ht 175.3 cm (69.02\")   Wt 85.6 kg (188 lb 11.4 oz)   BMI 27.86 kg/m²     Body mass index is 27.86 kg/m².        GENERAL APPEARANCE: awake, alert & oriented x 3, in no acute distress and well developed, well nourished  PSYCH: normal mood and affect  LUNGS:  breathing nonlabored, no wheezing  EYES: sclera anicteric, pupils equal  CARDIOVASCULAR: palpable pulses dorsalis pedis, palpable posterior tibial bilaterally. Capillary refill less than 2 seconds  INTEGUMENTARY: skin intact, no clubbing, cyanosis  NEUROLOGIC:  Normal gait and balance            Ortho Exam  Peripheral Vascular:    Upper Extremity:   Inspection:  Left--no cyanotic nail beds Right--no cyanotic nail beds   Bilateral:  Pink nail beds with brisk capillary refill   Palpation:  Bilateral radial pulse normal    Musculoskeletal:  Global Assessment:  Overall assessment of Lower Extremity Muscle Strength and Tone:  Left quadriceps--5/5   Left hamstrings--5/5       Left tibialis anterior--5/5  Left gastroc-soleus--5/5  Left " EHL--5/5      Lower Extremity:  Knee/Patella:  No digital clubbing or cyanosis.    Examination of left knee reveals:  Normal deep tendon reflexes, coordination, strength, tone, sensation.  No known fractures or deformities.    Inspection and Palpation:    Left knee:  Tenderness:  none  Effusion:  none  Crepitus:  none  Pulses:  2+  Ecchymosis:  None  Warmth:  None       ROM:  Right:  Extension:0    Flexion:135  Left:  Extension:0     Flexion:135    Instability:    Left:  Lachman Test:  Negative, Varus stress test negative, Valgus stress test negative   Anterior Drawer Test:  Negative, Posterior Drawer Test:  Negative      Deformities/Malalignments/Discrepancies:    Left:  none  Right:  none    Functional Testing:    Left:  Wilberto's test:  Negative  Patella grind test:  Negative  Q-angle:  Normal  Apprehension Sign:  Negative    Assessment/Plan        ICD-10-CM ICD-9-CM   1. Bone bruise T14.8XXA 924.9   2. Complex tear of medial meniscus of left knee as current injury, subsequent encounter S83.232D V58.89   3. Sprain of medial collateral ligament of left knee, subsequent encounter S83.412D V58.89     844.1     She will continue physical therapy and follow-up in 3 weeks    Medical Decision Making  Management Options : over-the-counter medicine and physical/occupational therapy    Jose Calhoun MD  04/27/18  8:22 AM

## 2018-05-18 ENCOUNTER — OFFICE VISIT (OUTPATIENT)
Dept: FAMILY MEDICINE CLINIC | Facility: CLINIC | Age: 63
End: 2018-05-18

## 2018-05-18 ENCOUNTER — OFFICE VISIT (OUTPATIENT)
Dept: ORTHOPEDIC SURGERY | Facility: CLINIC | Age: 63
End: 2018-05-18

## 2018-05-18 VITALS
OXYGEN SATURATION: 97 % | BODY MASS INDEX: 28.23 KG/M2 | TEMPERATURE: 97.8 F | WEIGHT: 190.6 LBS | HEIGHT: 69 IN | SYSTOLIC BLOOD PRESSURE: 138 MMHG | DIASTOLIC BLOOD PRESSURE: 80 MMHG | HEART RATE: 86 BPM

## 2018-05-18 VITALS
WEIGHT: 188.27 LBS | HEART RATE: 86 BPM | BODY MASS INDEX: 27.89 KG/M2 | HEIGHT: 69 IN | DIASTOLIC BLOOD PRESSURE: 78 MMHG | SYSTOLIC BLOOD PRESSURE: 155 MMHG

## 2018-05-18 DIAGNOSIS — T14.8XXA BONE BRUISE: Primary | ICD-10-CM

## 2018-05-18 DIAGNOSIS — I10 HYPERTENSION, ESSENTIAL, BENIGN: Primary | ICD-10-CM

## 2018-05-18 DIAGNOSIS — S83.232D COMPLEX TEAR OF MEDIAL MENISCUS OF LEFT KNEE AS CURRENT INJURY, SUBSEQUENT ENCOUNTER: ICD-10-CM

## 2018-05-18 DIAGNOSIS — S83.412D SPRAIN OF MEDIAL COLLATERAL LIGAMENT OF LEFT KNEE, SUBSEQUENT ENCOUNTER: ICD-10-CM

## 2018-05-18 PROCEDURE — 99213 OFFICE O/P EST LOW 20 MIN: CPT | Performed by: FAMILY MEDICINE

## 2018-05-18 PROCEDURE — 99213 OFFICE O/P EST LOW 20 MIN: CPT | Performed by: ORTHOPAEDIC SURGERY

## 2018-05-18 RX ORDER — AMLODIPINE BESYLATE 5 MG/1
5 TABLET ORAL DAILY
Qty: 30 TABLET | Refills: 5 | Status: SHIPPED | OUTPATIENT
Start: 2018-05-18 | End: 2018-11-19 | Stop reason: SDUPTHER

## 2018-05-18 NOTE — PROGRESS NOTES
"Subjective   Inez Ladd is a 63 y.o. female.     History of Present Illness   The patient is here for a follow up on Hypertension.    States she is doing well.  Denies any chest pain or shortness of breath.    BP today is 138/80.  Taking Amlodipine 5 mg daily.    Home reading are running about      The following portions of the patient's history were reviewed and updated as appropriate: allergies, current medications, past social history and problem list.    Review of Systems   Respiratory: Negative for shortness of breath.    Cardiovascular: Negative for chest pain.       Objective   /80 (BP Location: Left arm, Patient Position: Sitting, Cuff Size: Adult)   Pulse 86   Temp 97.8 °F (36.6 °C) (Temporal Artery )   Ht 175.3 cm (69.02\")   Wt 86.5 kg (190 lb 9.6 oz)   SpO2 97%   BMI 28.13 kg/m²   Physical Exam   Constitutional: She appears well-developed and well-nourished.   Cardiovascular: Normal rate and regular rhythm.    Pulmonary/Chest: Effort normal and breath sounds normal.   Nursing note and vitals reviewed.      Assessment/Plan   Problem List Items Addressed This Visit     None      Visit Diagnoses     Hypertension, essential, benign    -  Primary    Relevant Medications    amLODIPine (NORVASC) 5 MG tablet              Drink plenty fluids.    Continue medications as doing.     Follow up in 6 months for a physical exam.            Scribed for Dr Jose Culp by Sujata Kwan CMA.          I, Jose Culp MD, personally performed the services described in this documentation, as scribed by Sujata Kwan in my presence, and is both accurate and complete.      "

## 2018-05-18 NOTE — PROGRESS NOTES
[facility] [place] [facility] [place]    Subjective     Chief Complaint   Patient presents with   • Left Knee - Follow-up     3 week f/u  Complex tear of medial meniscus of left knee as current injury, subsequent encounter   sprain of medial collateral ligament of left knee, subsequent encounter               HPI      Inez Ladd is a 63 y.o. female.  She had an injury February 15 with her dog causing an MCL sprain medial meniscus tear and bone bruise.  MCL and bone bruise have healed up.  She continues to have a sharp stabbing pain with every step.  She points to the medial joint line.  Her pain is for a 10.  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        Past Medical History:   Diagnosis Date   • Acute cystitis    • Acute pharyngitis    • Body aches    • Cellulitis    • Dysuria    • H/O bladder infections     2 SINCE AUGUST, urinary tract infection   • History of prolapse of bladder      Bladder disorder   • Impacted cerumen     Impacted cerumen, unspecified ear    • Infected insect bite of forearm    • Menopause    • Prophylactic immunotherapy       Past Surgical History:   Procedure Laterality Date   • NO PAST SURGERIES        Family History   Problem Relation Age of Onset   • Coronary artery disease Other    • Coronary artery disease Mother    • Hypertension Mother    • No Known Problems Father      Social History     Social History   • Marital status:      Spouse name: N/A   • Number of children: N/A   • Years of education: N/A     Occupational History   • [employer]     Social History Main Topics   • Smoking status: Former Smoker     Types: Cigarettes     Quit date: 1/1/1998   • Smokeless tobacco: Never Used   • Alcohol use Yes      Comment: Occasional alcohol use    • Drug use: No   • Sexual activity: Defer     Other Topics Concern   • Not on file     Social History Narrative   • No narrative on file      Current Outpatient Prescriptions on File Prior to Visit   Medication  "Sig Dispense Refill   • acetaminophen (TYLENOL) 325 MG tablet Take 650 mg by mouth As Needed for Mild Pain .     • amLODIPine (NORVASC) 5 MG tablet Take 1 tablet by mouth Daily. 30 tablet 2   • fluticasone (FLONASE) 50 MCG/ACT nasal spray 2 sprays into each nostril Daily. Administer 2 sprays in each nostril for each dose. 1 bottle 2     No current facility-administered medications on file prior to visit.       No Known Allergies     The following portions of the patient's history were reviewed and updated as appropriate: allergies, current medications, past family history, past medical history, past social history, past surgical history and problem list.    Review of Systems   Constitutional: Positive for activity change.   HENT: Positive for hearing loss.    Genitourinary: Positive for urgency.   Neurological: Positive for headaches.        Objective      Physical Exam  /78   Pulse 86   Ht 175.3 cm (69.02\")   Wt 85.4 kg (188 lb 4.4 oz)   BMI 27.79 kg/m²     Body mass index is 27.79 kg/m².        GENERAL APPEARANCE: awake, alert & oriented x 3, in no acute distress and well developed, well nourished  PSYCH: normal mood and affect    Ortho Exam  Peripheral Vascular:    Upper Extremity:   Inspection:  Left--no cyanotic nail beds Right--no cyanotic nail beds   Bilateral:  Pink nail beds with brisk capillary refill   Palpation:  Bilateral radial pulse normal    Musculoskeletal:  Global Assessment:  Overall assessment of Lower Extremity Muscle Strength and Tone:  Left quadriceps--5/5   Left hamstrings--5/5       Left tibialis anterior--5/5  Left gastroc-soleus--5/5  Left EHL--5/5      Lower Extremity:  Knee/Patella:  No digital clubbing or cyanosis.    Examination of left knee reveals:  Normal deep tendon reflexes, coordination, strength, tone, sensation.  No known fractures or deformities.    Inspection and Palpation:    Left knee:  Tenderness:  Medial joint tenderness  Effusion:  none  Crepitus:  " none  Pulses:  2+  Ecchymosis:  None  Warmth:  None       ROM:  Right:  Extension:0    Flexion:135  Left:  Extension:0     Flexion:135    Instability:    Left:  Lachman Test:  Negative, Varus stress test negative, Valgus stress test negative   Anterior Drawer Test:  Negative, Posterior Drawer Test:  Negative      Deformities/Malalignments/Discrepancies:    Left:  none  Right:  none    Functional Testing:    Left:  Wilberto's test:  Positive  Patella grind test:  Negative  Q-angle:  Normal  Apprehension Sign:  Negative    Assessment/Plan        ICD-10-CM ICD-9-CM   1. Bone bruise T14.8XXA 924.9   2. Complex tear of medial meniscus of left knee as current injury, subsequent encounter S83.232D V58.89   3. Sprain of medial collateral ligament of left knee, subsequent encounter S83.412D V58.89     844.1     I recommend left knee arthroscopy with partial medial meniscectomy.  She still has some arthritis in her knee.  And she will most likely be left with a deficient medial meniscus.  But at her age I think that is the best option.  I do not recommend a total knee arthroplasty or other procedure at this time.Treatment options and alternatives were discussed with patient.  Surgical versus non surgical treatment options were discussed as well.    We reviewed the nature of the planned procedure including the risks, benefits and potential complications.  Understanding was expressed and the decision was made to proceed with surgery.      Medical Decision Making  Management Options : over-the-counter medicine and major surgery with risk factors      Jose Calhoun MD  05/18/18  8:18 AM         EMR Dragon/Transcription disclaimer:  Much of this encounter note is an electronic transcription of spoken language to printed text. Electronic transcription of spoken language may permit erroneous, or at times, nonsensical words or phrases to be inadvertently transcribed. Although I have reviewed the note for such errors, some may  still exist.

## 2018-06-14 ENCOUNTER — OFFICE VISIT (OUTPATIENT)
Dept: FAMILY MEDICINE CLINIC | Facility: CLINIC | Age: 63
End: 2018-06-14

## 2018-06-14 VITALS
RESPIRATION RATE: 18 BRPM | HEART RATE: 81 BPM | TEMPERATURE: 98.5 F | OXYGEN SATURATION: 99 % | DIASTOLIC BLOOD PRESSURE: 70 MMHG | WEIGHT: 188 LBS | HEIGHT: 69 IN | SYSTOLIC BLOOD PRESSURE: 120 MMHG | BODY MASS INDEX: 27.85 KG/M2

## 2018-06-14 DIAGNOSIS — R20.0 NUMBNESS OF FINGER: Primary | ICD-10-CM

## 2018-06-14 PROCEDURE — 99213 OFFICE O/P EST LOW 20 MIN: CPT | Performed by: FAMILY MEDICINE

## 2018-06-14 NOTE — PROGRESS NOTES
"Subjective   Inez Ladd is a 63 y.o. female.     History of Present Illness   The patient presents today with numbness in the right middle finger and tingling in the palm. Worse with writing and holding her cell phone.    States this started in December. States she did have a fall in the parking lot of her work and she also had a fall at her home last year. Not sure if this is relevant.  Denies any chest pain or shortness of breath.    The following portions of the patient's history were reviewed and updated as appropriate: allergies, current medications, past social history and problem list.    Review of Systems   Respiratory: Negative for shortness of breath.    Cardiovascular: Negative for chest pain.   Neurological: Positive for numbness (right third finger).       Objective   /70   Pulse 81   Temp 98.5 °F (36.9 °C) (Tympanic)   Resp 18   Ht 175.3 cm (69\")   Wt 85.3 kg (188 lb)   SpO2 99%   BMI 27.76 kg/m²   Physical Exam   Constitutional: She is oriented to person, place, and time. She appears well-developed and well-nourished.   Neurological: She is alert and oriented to person, place, and time. She displays normal reflexes. No cranial nerve deficit or sensory deficit. She exhibits normal muscle tone. Coordination normal.   Negative Tinel sign at the right wrist.  Normal neurovascular examination of the right hand and fingers.  Negative compression test of the head to rule out cervical radiculopathy.  Normal neurologic exam regarding brain pathology.   Nursing note and vitals reviewed.      Assessment/Plan   Problem List Items Addressed This Visit     None      Visit Diagnoses     Numbness of finger    -  Primary    right third finger        I suspect that the numbness of the right third finger represents a partial expression of a carpal tunnel syndrome of the right wrist.  We'll have her use a splint each night and use ibuprofen 400 mg 3 times a day.  If she does not improve within the next " 2-4 weeks then referred to neurology.        Drink plenty fluids.    Wear her wrist splint with driving and at bedtime.    Take OTC Ibuprofen 200 mg 2 three times a day with food.    Refer to Neurology Dr Castellon.    Follow up as needed.            Scribed for Dr Jose Culp by Sujata Kwan CMA.          I, Jose Culp MD, personally performed the services described in this documentation, as scribed by Sujata Kwan in my presence, and is both accurate and complete.

## 2018-06-27 ENCOUNTER — OFFICE VISIT (OUTPATIENT)
Dept: NEUROLOGY | Facility: CLINIC | Age: 63
End: 2018-06-27

## 2018-06-27 VITALS
DIASTOLIC BLOOD PRESSURE: 78 MMHG | HEIGHT: 69 IN | WEIGHT: 188 LBS | SYSTOLIC BLOOD PRESSURE: 126 MMHG | BODY MASS INDEX: 27.85 KG/M2

## 2018-06-27 DIAGNOSIS — G56.01 CARPAL TUNNEL SYNDROME OF RIGHT WRIST: Primary | ICD-10-CM

## 2018-06-27 PROCEDURE — 99204 OFFICE O/P NEW MOD 45 MIN: CPT | Performed by: PSYCHIATRY & NEUROLOGY

## 2018-06-27 NOTE — PROGRESS NOTES
Subjective:    CC: Inez Ladd is seen today in consultation at the request of Jose Culp MD for Numbness (right hand and fingers)       HPI:  Patient is referred to the clinic the for evaluation of buttoning and numbness involving the right hand digit 3.  She reports that he started sometime in January 2018 and has progressively become worse.  She reports that it mainly involves the distal half of the right digit #3 and also involves distal most part of digits 1 and 2.  Writing, holding cell phone and sometimes driving makes the symptoms worse.  She reports that  holding the digit 3 tightly relieves symptoms.  She denies any worsening of symptoms at night and it does not wake her up at night.  She denies any weakness in her right hand.  She denies neck pain or any radicular symptoms.  She reports that she fell as prior to onset of this symptoms.  She did not injure  elbow or wrist with a fall though.  She tried wrist brace for the 1 day but it was of not a correct fit so she has not used it after that.    The following portions of the patient's history were reviewed today and updated as of 06/27/2018  : allergies, social history and problem list.  This document will be scanned to patient's chart.      Current Outpatient Prescriptions:   •  acetaminophen (TYLENOL) 325 MG tablet, Take 650 mg by mouth As Needed for Mild Pain ., Disp: , Rfl:   •  amLODIPine (NORVASC) 5 MG tablet, Take 1 tablet by mouth Daily., Disp: 30 tablet, Rfl: 5  •  fluticasone (FLONASE) 50 MCG/ACT nasal spray, 2 sprays into each nostril Daily. Administer 2 sprays in each nostril for each dose., Disp: 1 bottle, Rfl: 2  •  HYDROcodone-acetaminophen (NORCO) 7.5-325 MG per tablet, Take 1-2 tablets by mouth Every 6 (Six) Hours As Needed., Disp: 24 tablet, Rfl: 0   Past Medical History:   Diagnosis Date   • Acute cystitis    • Acute pharyngitis    • Body aches    • Cellulitis    • Dysuria    • H/O bladder infections     2 SINCE AUGUST, urinary  "tract infection   • History of prolapse of bladder      Bladder disorder   • Hypertension    • Impacted cerumen     Impacted cerumen, unspecified ear    • Infected insect bite of forearm    • Menopause    • Prophylactic immunotherapy       Past Surgical History:   Procedure Laterality Date   • NO PAST SURGERIES        Family History   Problem Relation Age of Onset   • Coronary artery disease Other    • Coronary artery disease Mother    • Hypertension Mother    • Dementia Mother    • No Known Problems Father       Review of Systems   Constitutional: Positive for activity change, appetite change and fatigue.   HENT: Positive for hearing loss.    Eyes: Negative.    Respiratory: Negative.    Cardiovascular: Negative.    Musculoskeletal: Positive for gait problem.   Skin: Negative.    Allergic/Immunologic: Negative.    Neurological: Positive for numbness and headache.   Psychiatric/Behavioral: Negative.      Objective:    /78   Ht 175.3 cm (69.02\")   Wt 85.3 kg (188 lb)   BMI 27.75 kg/m²     Neurology Exam:    General apperance: NAD.     Mental status: Alert, awake and oriented to time place and person.    Recent and Remote memory: Can recall 3/3 objects at 5 minutes. Can recall historical events.     Attention span and Concentration: Serial 7s: Normal.     Fund of knowledge:  Normal.     Language and Speech: No aphasia or dysarthria.    Naming , Repitition and Comprehension:  Can name objects, repeat a sentence and follow commands. Speech is clear and fluent with good repetition, comprehension, and naming.    Cranial Nerves:   CN II: Visual fields are full. Intact. Fundi - Normal, No papillederma, Pupils - EDIN  CN III, IV and VI: Extraocular movements are intact. Normal saccades.   CN V: Facial sensation is intact.   CN VII: Muscles of facial expression reveal no asymmetry. Intact.   CN VIII: Hearing is intact. Whispered voice intact.   CN IX and X: Palate elevates symmetrically. Intact  CN XI: Shoulder shrug " is intact.   CN XII: Tongue is midline without evidence of atrophy or fasciculation.     Motor:  Right UE muscle strength 5/5. Normal tone.     Left UE muscle strength 5/5. Normal tone.      Right LE muscle strength5/5. Normal tone.     Left LE muscle strength 5/5. Normal tone.      Sensory: Mild decrease in light touch and pinprick sensation involving digits  1-3 on the right.  Rest are normal.     Positive Tinel's test on the right.    DTRs: 2+ bilaterally in upper and lower extremities.    Babinski: Negative bilaterally.    Co-ordination: Normal finger-to-nose, heel to shin B/L.    Rhomberg: Negative.    Gait: Normal.    Cardiovascular: Regular rate and rhythm without murmur, gallop or rub.    Assessment and Plan:  1. Carpal tunnel syndrome of right wrist  Likely right carpal tunnel syndrome.  Will schedule her for EMG/nerve conduction study to determine severity.  Based on the severity, further treatment options will be discussed with the patient.  Until then, I've advised her to start using wrist brace on the right and see if it helps.    - EMG & Nerve Conduction Test; Future       Return in about 6 weeks (around 8/8/2018).     Domingo Castellon MD

## 2018-08-02 ENCOUNTER — TELEPHONE (OUTPATIENT)
Dept: FAMILY MEDICINE CLINIC | Facility: CLINIC | Age: 63
End: 2018-08-02

## 2018-08-06 ENCOUNTER — OFFICE VISIT (OUTPATIENT)
Dept: NEUROLOGY | Facility: CLINIC | Age: 63
End: 2018-08-06

## 2018-08-06 VITALS
DIASTOLIC BLOOD PRESSURE: 76 MMHG | BODY MASS INDEX: 27.99 KG/M2 | WEIGHT: 189 LBS | SYSTOLIC BLOOD PRESSURE: 188 MMHG | HEIGHT: 69 IN

## 2018-08-06 DIAGNOSIS — G56.01 CARPAL TUNNEL SYNDROME OF RIGHT WRIST: Primary | ICD-10-CM

## 2018-08-06 PROCEDURE — 99213 OFFICE O/P EST LOW 20 MIN: CPT | Performed by: PSYCHIATRY & NEUROLOGY

## 2018-08-06 NOTE — PROGRESS NOTES
Subjective:     Patient ID: Inez Ladd is a 63 y.o. female in clinic for follow-up for right hand digits 1 and 2 and 3 paresthesias.    CC:   Chief Complaint   Patient presents with   • Carpal Tunnel       HPI:   History of Present Illness     Patient is in clinic for regular follow-up.  Since her last visit, she reports that she has been using wrist brace on the right and it has helped somewhat.  She hasn't had EMG/nerve conduction study as it.  She is scheduled to have it done on August 20.  She denies any worsening in her symptoms and denies any weakness involving right hand.    Past Medical History:   Diagnosis Date   • Acute cystitis    • Acute pharyngitis    • Body aches    • Cellulitis    • Dysuria    • H/O bladder infections     2 SINCE AUGUST, urinary tract infection   • History of prolapse of bladder      Bladder disorder   • Hypertension    • Impacted cerumen     Impacted cerumen, unspecified ear    • Infected insect bite of forearm    • Menopause    • Prophylactic immunotherapy        Past Surgical History:   Procedure Laterality Date   • NO PAST SURGERIES         Social History     Social History   • Marital status:      Spouse name: N/A   • Number of children: N/A   • Years of education: N/A     Occupational History   • [employer]     Social History Main Topics   • Smoking status: Former Smoker     Types: Cigarettes     Quit date: 1/1/1998   • Smokeless tobacco: Never Used   • Alcohol use Yes      Comment: Occasional alcohol use    • Drug use: No   • Sexual activity: Defer     Other Topics Concern   • Not on file     Social History Narrative   • No narrative on file       Family History   Problem Relation Age of Onset   • Coronary artery disease Other    • Coronary artery disease Mother    • Hypertension Mother    • Dementia Mother    • No Known Problems Father         Review of Systems   Constitutional: Negative.    HENT: Positive for hearing loss.    Eyes: Negative.     Respiratory: Negative.    Cardiovascular: Negative.    Gastrointestinal: Negative.    Endocrine: Negative.    Genitourinary: Negative.    Musculoskeletal: Negative.    Skin: Negative.    Allergic/Immunologic: Negative.    Neurological: Positive for numbness.   Hematological: Negative.    Psychiatric/Behavioral: Negative.         Objective:    Neurologic Exam    General apperance: NAD.     Mental status: Alert, awake and oriented to time place and person.    Recent and Remote memory: Can recall 3/3 objects at 5 minutes. Can recall historical events.     Attention span and Concentration: Serial 7s: Normal.     Fund of knowledge:  Normal.     Language and Speech: No aphasia or dysarthria.    Naming , Repitition and Comprehension:  Can name objects, repeat a sentence and follow commands. Speech is clear and fluent with good repetition, comprehension, and naming.    Cranial Nerves:   CN II: Visual fields are full. Intact. Fundi - Normal, No papillederma, Pupils - EDIN  CN III, IV and VI: Extraocular movements are intact. Normal saccades.   CN V: Facial sensation is intact.   CN VII: Muscles of facial expression reveal no asymmetry. Intact.   CN VIII: Hearing is intact. Whispered voice intact.   CN IX and X: Palate elevates symmetrically. Intact  CN XI: Shoulder shrug is intact.   CN XII: Tongue is midline without evidence of atrophy or fasciculation.     Motor:  Right UE muscle strength 5/5. Normal tone.     Left UE muscle strength 5/5. Normal tone.      Right LE muscle strength5/5. Normal tone.     Left LE muscle strength 5/5. Normal tone.      Sensory: Mild decrease in light touch and pinprick sensation involving digits  1-3 on the right.  Rest are normal.     Positive Tinel's test on the right.    DTRs: 2+ bilaterally in upper and lower extremities.    Babinski: Negative bilaterally.    Co-ordination: Normal finger-to-nose, heel to shin B/L.    Rhomberg: Negative.    Gait: Normal.    Cardiovascular: Regular rate  and rhythm without murmur, gallop or rub.    Physical Exam    Assessment/Plan:  1.  Carpal tunnel syndrome.  [place] carpal tunnel syndrome on the right.  She hasn't had EMG/nerve conduction study as yet to confirm the diagnosis and to confirm the severity.  She is scheduled to have it done on August 20.  She has been using wrist brace on the right and it has helped somewhat.  I have advised her to continue using wrist brace and I will see her back after EMG is done to discuss further treatment options.        Domingo Castellon MD  8/6/2018

## 2018-08-20 ENCOUNTER — HOSPITAL ENCOUNTER (OUTPATIENT)
Dept: NEUROLOGY | Facility: HOSPITAL | Age: 63
Discharge: HOME | End: 2018-08-20
Attending: PSYCHIATRY & NEUROLOGY | Admitting: PSYCHIATRY & NEUROLOGY

## 2018-08-20 DIAGNOSIS — G56.01 CARPAL TUNNEL SYNDROME OF RIGHT WRIST: ICD-10-CM

## 2018-08-20 PROCEDURE — 95908 NRV CNDJ TST 3-4 STUDIES: CPT

## 2018-08-20 PROCEDURE — 95886 MUSC TEST DONE W/N TEST COMP: CPT

## 2018-08-21 ENCOUNTER — TELEPHONE (OUTPATIENT)
Dept: NEUROLOGY | Facility: CLINIC | Age: 63
End: 2018-08-21

## 2018-08-21 DIAGNOSIS — G56.01 CARPAL TUNNEL SYNDROME OF RIGHT WRIST: Primary | ICD-10-CM

## 2018-08-21 NOTE — TELEPHONE ENCOUNTER
----- Message from Domingo Castellon MD sent at 8/21/2018 10:31 AM EDT -----  Please call the patient regarding her abnormal result.  EMG/Nerve conduction study confirms carpal tunnel syndrome on the right.  It is of moderate severity.  She also has pinched  nerve at the elbow which is mild.  I'll be sending referred to neurosurgery for carpal tunnel release on the right.  She also needs to start wearing elbow brace for pinched nerve at the elbow and see if that helps.

## 2018-08-21 NOTE — TELEPHONE ENCOUNTER
Contacted pt notified her of Dr. Castellon's results she verbalized understanding and will cb if needed.

## 2018-11-26 RX ORDER — AMLODIPINE BESYLATE 5 MG/1
5 TABLET ORAL DAILY
Qty: 30 TABLET | Refills: 5 | Status: SHIPPED | OUTPATIENT
Start: 2018-11-26 | End: 2018-12-03 | Stop reason: SDUPTHER

## 2018-12-03 ENCOUNTER — OFFICE VISIT (OUTPATIENT)
Dept: FAMILY MEDICINE CLINIC | Facility: CLINIC | Age: 63
End: 2018-12-03

## 2018-12-03 VITALS
WEIGHT: 200.8 LBS | OXYGEN SATURATION: 99 % | BODY MASS INDEX: 30.43 KG/M2 | HEART RATE: 103 BPM | DIASTOLIC BLOOD PRESSURE: 82 MMHG | SYSTOLIC BLOOD PRESSURE: 132 MMHG | HEIGHT: 68 IN | RESPIRATION RATE: 20 BRPM | TEMPERATURE: 98.2 F

## 2018-12-03 DIAGNOSIS — H61.23 BILATERAL IMPACTED CERUMEN: Primary | ICD-10-CM

## 2018-12-03 DIAGNOSIS — I10 ESSENTIAL HYPERTENSION: ICD-10-CM

## 2018-12-03 LAB
ALBUMIN SERPL-MCNC: 4.59 G/DL (ref 3.2–4.8)
ALBUMIN/GLOB SERPL: 2.4 G/DL (ref 1.5–2.5)
ALP SERPL-CCNC: 87 U/L (ref 25–100)
ALT SERPL W P-5'-P-CCNC: 18 U/L (ref 7–40)
ANION GAP SERPL CALCULATED.3IONS-SCNC: 9 MMOL/L (ref 3–11)
ARTICHOKE IGE QN: 120 MG/DL (ref 0–130)
AST SERPL-CCNC: 19 U/L (ref 0–33)
BASOPHILS # BLD AUTO: 0.03 10*3/MM3 (ref 0–0.2)
BASOPHILS NFR BLD AUTO: 0.4 % (ref 0–1)
BILIRUB SERPL-MCNC: 0.8 MG/DL (ref 0.3–1.2)
BUN BLD-MCNC: 15 MG/DL (ref 9–23)
BUN/CREAT SERPL: 21.1 (ref 7–25)
CALCIUM SPEC-SCNC: 9.4 MG/DL (ref 8.7–10.4)
CHLORIDE SERPL-SCNC: 105 MMOL/L (ref 99–109)
CHOLEST SERPL-MCNC: 227 MG/DL (ref 0–200)
CO2 SERPL-SCNC: 26 MMOL/L (ref 20–31)
CREAT BLD-MCNC: 0.71 MG/DL (ref 0.6–1.3)
DEPRECATED RDW RBC AUTO: 45.9 FL (ref 37–54)
EOSINOPHIL # BLD AUTO: 0.23 10*3/MM3 (ref 0–0.3)
EOSINOPHIL NFR BLD AUTO: 3.1 % (ref 0–3)
ERYTHROCYTE [DISTWIDTH] IN BLOOD BY AUTOMATED COUNT: 13.7 % (ref 11.3–14.5)
GFR SERPL CREATININE-BSD FRML MDRD: 83 ML/MIN/1.73
GLOBULIN UR ELPH-MCNC: 1.9 GM/DL
GLUCOSE BLD-MCNC: 93 MG/DL (ref 70–100)
HCT VFR BLD AUTO: 44.2 % (ref 34.5–44)
HDLC SERPL-MCNC: 64 MG/DL (ref 40–60)
HGB BLD-MCNC: 13.8 G/DL (ref 11.5–15.5)
IMM GRANULOCYTES # BLD: 0.02 10*3/MM3 (ref 0–0.03)
IMM GRANULOCYTES NFR BLD: 0.3 % (ref 0–0.6)
LYMPHOCYTES # BLD AUTO: 2.03 10*3/MM3 (ref 0.6–4.8)
LYMPHOCYTES NFR BLD AUTO: 27.1 % (ref 24–44)
MCH RBC QN AUTO: 28.6 PG (ref 27–31)
MCHC RBC AUTO-ENTMCNC: 31.2 G/DL (ref 32–36)
MCV RBC AUTO: 91.7 FL (ref 80–99)
MONOCYTES # BLD AUTO: 0.45 10*3/MM3 (ref 0–1)
MONOCYTES NFR BLD AUTO: 6 % (ref 0–12)
NEUTROPHILS # BLD AUTO: 4.74 10*3/MM3 (ref 1.5–8.3)
NEUTROPHILS NFR BLD AUTO: 63.4 % (ref 41–71)
PLATELET # BLD AUTO: 312 10*3/MM3 (ref 150–450)
PMV BLD AUTO: 10.5 FL (ref 6–12)
POTASSIUM BLD-SCNC: 3.9 MMOL/L (ref 3.5–5.5)
PROT SERPL-MCNC: 6.5 G/DL (ref 5.7–8.2)
RBC # BLD AUTO: 4.82 10*6/MM3 (ref 3.89–5.14)
SODIUM BLD-SCNC: 140 MMOL/L (ref 132–146)
TRIGL SERPL-MCNC: 196 MG/DL (ref 0–150)
TSH SERPL DL<=0.05 MIU/L-ACNC: 0.65 MIU/ML (ref 0.35–5.35)
WBC NRBC COR # BLD: 7.48 10*3/MM3 (ref 3.5–10.8)

## 2018-12-03 PROCEDURE — 80061 LIPID PANEL: CPT | Performed by: FAMILY MEDICINE

## 2018-12-03 PROCEDURE — 84443 ASSAY THYROID STIM HORMONE: CPT | Performed by: FAMILY MEDICINE

## 2018-12-03 PROCEDURE — 85025 COMPLETE CBC W/AUTO DIFF WBC: CPT | Performed by: FAMILY MEDICINE

## 2018-12-03 PROCEDURE — 36415 COLL VENOUS BLD VENIPUNCTURE: CPT | Performed by: FAMILY MEDICINE

## 2018-12-03 PROCEDURE — 99213 OFFICE O/P EST LOW 20 MIN: CPT | Performed by: FAMILY MEDICINE

## 2018-12-03 PROCEDURE — 69209 REMOVE IMPACTED EAR WAX UNI: CPT | Performed by: FAMILY MEDICINE

## 2018-12-03 PROCEDURE — 80053 COMPREHEN METABOLIC PANEL: CPT | Performed by: FAMILY MEDICINE

## 2018-12-03 RX ORDER — AMLODIPINE BESYLATE 5 MG/1
5 TABLET ORAL DAILY
Qty: 30 TABLET | Refills: 5 | Status: SHIPPED | OUTPATIENT
Start: 2018-12-03 | End: 2020-04-27 | Stop reason: SDUPTHER

## 2018-12-03 NOTE — PROGRESS NOTES
"Subjective   Inez Ladd is a 63 y.o. female seen today for Cerumen Impaction; Med Refill; and Hypertension.     History of Present Illness   The patient is here today with c/o bilateral cerumen impaction.    States she is having hearing testing tomorrow and needing ears rinsed prior to the exam.  Denies any chest pain or shortness of breath.    Also a follow up on Hypertension.  BP today is 132/82.  Taking Amlodipine 5 mg daily.      The following portions of the patient's history were reviewed and updated as appropriate: allergies, current medications, past social history and problem list.    Review of Systems   Constitutional: Negative for chills and fever.   HENT: Positive for hearing loss. Negative for ear discharge, ear pain and postnasal drip.    Eyes: Negative for visual disturbance.   Respiratory: Negative for shortness of breath.    Cardiovascular: Negative for chest pain.       Objective   /82   Pulse 103   Temp 98.2 °F (36.8 °C) (Temporal)   Resp 20   Ht 172.7 cm (68\")   Wt 91.1 kg (200 lb 12.8 oz)   SpO2 99%   BMI 30.53 kg/m²   Physical Exam    Assessment/Plan   Problem List Items Addressed This Visit     None      Visit Diagnoses     Bilateral impacted cerumen    -  Primary    Essential hypertension            Ear Cerumen Removal Instrumentation  Date/Time: 12/3/2018 12:37 PM  Performed by: Jose Culp MD  Authorized by: Jose Culp MD   Consent: Verbal consent obtained.  Patient understanding: patient states understanding of the procedure being performed  Patient consent: the patient's understanding of the procedure matches consent given  Patient identity confirmed: verbally with patient  Location details: right ear and left ear  Patient tolerance: Patient tolerated the procedure well with no immediate complications  Procedure type: irrigation   Sedation:  Patient sedated: no                Drink plenty fluids.    Continue medications as doing.    Check a CBC,CMP,Lipids, and " TSH. Report results by letter.      Follow up in 6 months.              Scribed for Dr Jose Culp by Sujata Kwan CMA.          I, Jose Culp MD, personally performed the services described in this documentation, as scribed by Sujata Kwan in my presence, and is both accurate and complete.        (Please note that portions of this note were completed with a voice recognition program. Efforts were made to edit the dictations,but occasionally words are mis transcribed.)

## 2018-12-10 ENCOUNTER — OFFICE VISIT (OUTPATIENT)
Dept: FAMILY MEDICINE CLINIC | Facility: CLINIC | Age: 63
End: 2018-12-10

## 2018-12-10 VITALS
SYSTOLIC BLOOD PRESSURE: 120 MMHG | RESPIRATION RATE: 20 BRPM | BODY MASS INDEX: 30.01 KG/M2 | WEIGHT: 198 LBS | TEMPERATURE: 99.8 F | DIASTOLIC BLOOD PRESSURE: 74 MMHG | OXYGEN SATURATION: 98 % | HEIGHT: 68 IN | HEART RATE: 131 BPM

## 2018-12-10 DIAGNOSIS — J06.9 ACUTE URI: Primary | ICD-10-CM

## 2018-12-10 DIAGNOSIS — R50.9 FEVER, UNSPECIFIED FEVER CAUSE: ICD-10-CM

## 2018-12-10 DIAGNOSIS — R11.0 NAUSEA: ICD-10-CM

## 2018-12-10 PROBLEM — K11.5: Status: ACTIVE | Noted: 2018-12-10

## 2018-12-10 PROBLEM — S83.412A SPRAIN OF MEDIAL COLLATERAL LIGAMENT OF LEFT KNEE: Status: RESOLVED | Noted: 2018-02-21 | Resolved: 2018-12-10

## 2018-12-10 LAB
EXPIRATION DATE: NORMAL
EXPIRATION DATE: NORMAL
FLUAV AG NPH QL: NEGATIVE
FLUBV AG NPH QL: NEGATIVE
HETEROPH AB SER QL LA: NEGATIVE
INTERNAL CONTROL: NORMAL
INTERNAL CONTROL: NORMAL
Lab: NORMAL
Lab: NORMAL

## 2018-12-10 PROCEDURE — 87804 INFLUENZA ASSAY W/OPTIC: CPT | Performed by: FAMILY MEDICINE

## 2018-12-10 PROCEDURE — 86308 HETEROPHILE ANTIBODY SCREEN: CPT | Performed by: FAMILY MEDICINE

## 2018-12-10 PROCEDURE — 99213 OFFICE O/P EST LOW 20 MIN: CPT | Performed by: FAMILY MEDICINE

## 2018-12-10 RX ORDER — PROMETHAZINE HYDROCHLORIDE 12.5 MG/1
12.5 TABLET ORAL EVERY 8 HOURS PRN
Qty: 10 TABLET | Refills: 1 | Status: SHIPPED | OUTPATIENT
Start: 2018-12-10 | End: 2019-02-06

## 2018-12-10 RX ORDER — AMOXICILLIN 500 MG/1
500 CAPSULE ORAL 2 TIMES DAILY
Qty: 20 CAPSULE | Refills: 0
Start: 2018-12-10 | End: 2018-12-29

## 2018-12-10 NOTE — PROGRESS NOTES
"Subjective   Inez Ladd is a 63 y.o. female seen today for Fever (started today); Chills; Fatigue; and Nausea.     Fever    This is a new problem. The current episode started today. The problem has been unchanged. The maximum temperature noted was 99 to 99.9 F. The temperature was taken using a tympanic thermometer. Associated symptoms include nausea. Pertinent negatives include no abdominal pain, chest pain, coughing, ear pain or vomiting.      The patient is here today with c/o fever,chills,fatigue, and Nausea.    States  Denies any chest pain or shortness of breath.    The following portions of the patient's history were reviewed and updated as appropriate: allergies, current medications, past social history and problem list.    Review of Systems   Constitutional: Positive for chills, fatigue and fever.   HENT: Negative for ear pain.    Respiratory: Negative for cough.    Cardiovascular: Negative for chest pain.   Gastrointestinal: Positive for nausea. Negative for abdominal pain and vomiting.       Objective   /74   Pulse (!) 131   Temp 99.8 °F (37.7 °C) (Temporal)   Resp 20   Ht 172.7 cm (68\")   Wt 89.8 kg (198 lb)   SpO2 98%   BMI 30.11 kg/m²   Physical Exam   Constitutional: She appears well-developed and well-nourished.   HENT:   Right Ear: External ear normal.   Left Ear: External ear normal.   Mouth/Throat: Oropharynx is clear and moist.   Cardiovascular: Normal rate and regular rhythm.   Pulmonary/Chest: Effort normal and breath sounds normal.   Nursing note and vitals reviewed.      Assessment/Plan   Problem List Items Addressed This Visit     None      Visit Diagnoses     Acute URI    -  Primary    Relevant Orders    POCT Infectious mononucleosis antibody (Completed)    Fever, unspecified fever cause        Relevant Orders    POCT Influenza A/B (Completed)    Nausea            Testing for influenza as well as for mononucleosis was negative.  The patient is some respects appears to have " influenza although her temperature is lower than normal for that.  We will make a diagnosis of an upper respiratory infection.  Treat symptomatically.  If feeling worse over the next 48 hours or if she develops higher fever, then return to see us.      Drink plenty fluids.    Use OTC Advil 2 3 times a day as needed.    Rx for Promethazine 12.5 mg as needed #10+1.    Written Rx for Amoxicillin 500 mg if needed #20+0.    Follow up as needed.              Scribed for Dr Jose Culp by Sujata Kwan CMA.

## 2018-12-11 ENCOUNTER — OFFICE VISIT (OUTPATIENT)
Dept: FAMILY MEDICINE CLINIC | Facility: CLINIC | Age: 63
End: 2018-12-11

## 2018-12-11 VITALS
HEART RATE: 104 BPM | BODY MASS INDEX: 30.01 KG/M2 | OXYGEN SATURATION: 98 % | HEIGHT: 68 IN | WEIGHT: 198 LBS | SYSTOLIC BLOOD PRESSURE: 142 MMHG | DIASTOLIC BLOOD PRESSURE: 80 MMHG | TEMPERATURE: 100.2 F

## 2018-12-11 DIAGNOSIS — R09.1 PLEURISY: ICD-10-CM

## 2018-12-11 DIAGNOSIS — J06.9 UPPER RESPIRATORY TRACT INFECTION, UNSPECIFIED TYPE: Primary | ICD-10-CM

## 2018-12-11 PROBLEM — Z78.0 MENOPAUSE PRESENT: Status: ACTIVE | Noted: 2018-12-11

## 2018-12-11 PROCEDURE — 96372 THER/PROPH/DIAG INJ SC/IM: CPT | Performed by: NURSE PRACTITIONER

## 2018-12-11 PROCEDURE — 99213 OFFICE O/P EST LOW 20 MIN: CPT | Performed by: NURSE PRACTITIONER

## 2018-12-11 RX ORDER — CEFTRIAXONE SODIUM 250 MG/1
500 INJECTION, POWDER, FOR SOLUTION INTRAMUSCULAR; INTRAVENOUS ONCE
Status: COMPLETED | OUTPATIENT
Start: 2018-12-11 | End: 2018-12-11

## 2018-12-11 RX ORDER — DEXAMETHASONE SODIUM PHOSPHATE 4 MG/ML
8 INJECTION, SOLUTION INTRA-ARTICULAR; INTRALESIONAL; INTRAMUSCULAR; INTRAVENOUS; SOFT TISSUE ONCE
Status: COMPLETED | OUTPATIENT
Start: 2018-12-11 | End: 2018-12-11

## 2018-12-11 RX ORDER — IBUPROFEN 800 MG/1
800 TABLET ORAL EVERY 8 HOURS PRN
Qty: 60 TABLET | Refills: 0 | Status: SHIPPED | OUTPATIENT
Start: 2018-12-11 | End: 2018-12-25

## 2018-12-11 RX ADMIN — CEFTRIAXONE SODIUM 500 MG: 250 INJECTION, POWDER, FOR SOLUTION INTRAMUSCULAR; INTRAVENOUS at 10:58

## 2018-12-11 RX ADMIN — DEXAMETHASONE SODIUM PHOSPHATE 8 MG: 4 INJECTION, SOLUTION INTRA-ARTICULAR; INTRALESIONAL; INTRAMUSCULAR; INTRAVENOUS; SOFT TISSUE at 10:59

## 2018-12-11 NOTE — PROGRESS NOTES
Inez Ladd is a 63 y.o. female who presents for upper respiratory symptoms that have worsened.    Chief Complaint   Patient presents with   • Flank Pain     left, painful when breathing       Patient was here to see Dr. Culp yesterday and was put on Amoxicillin for upper respiratory symptoms. Last night she developed nausea and vomiting as well as pleuritic pain on her left side under the axilla. She feels the pain when she takes a deep breath. She still has a fever and vomited 3 times last night and this morning. She does not have sinus symptoms or sore throat.          Past Medical History:   Diagnosis Date   • Acute cystitis    • Acute pharyngitis    • Body aches    • Cellulitis    • Dysuria    • H/O bladder infections     2 SINCE AUGUST, urinary tract infection   • History of prolapse of bladder      Bladder disorder   • Hypertension    • Impacted cerumen     Impacted cerumen, unspecified ear    • Infected insect bite of forearm    • Menopause    • Prophylactic immunotherapy        Past Surgical History:   Procedure Laterality Date   • CARPAL TUNNEL RELEASE     • KNEE SURGERY     • NO PAST SURGERIES         Family History   Problem Relation Age of Onset   • Coronary artery disease Other    • Coronary artery disease Mother    • Hypertension Mother    • Dementia Mother    • No Known Problems Father        Social History     Socioeconomic History   • Marital status:      Spouse name: Not on file   • Number of children: Not on file   • Years of education: Not on file   • Highest education level: Not on file   Social Needs   • Financial resource strain: Not on file   • Food insecurity - worry: Not on file   • Food insecurity - inability: Not on file   • Transportation needs - medical: Not on file   • Transportation needs - non-medical: Not on file   Occupational History   • Occupation: Nurse     Comment: [employer]   Tobacco Use   • Smoking status: Former Smoker     Types: Cigarettes     Last attempt to  "quit: 1998     Years since quittin.9   • Smokeless tobacco: Never Used   Substance and Sexual Activity   • Alcohol use: Yes     Comment: Occasional alcohol use    • Drug use: No   • Sexual activity: Defer   Other Topics Concern   • Not on file   Social History Narrative   • Not on file       No Known Allergies    ROS    Review of Systems   Constitutional: Positive for chills, fatigue and fever.   Respiratory: Positive for cough, shortness of breath and wheezing.    Gastrointestinal: Positive for nausea and vomiting.   Musculoskeletal: Positive for myalgias.   All other systems reviewed and are negative.      Vitals:    18 1017   BP: 142/80   Pulse: 104   Temp: 100.2 °F (37.9 °C)   SpO2: 98%   Weight: 89.8 kg (198 lb)   Height: 172.7 cm (68\")         Current Outpatient Medications:   •  acetaminophen (TYLENOL) 325 MG tablet, Take 650 mg by mouth As Needed for Mild Pain ., Disp: , Rfl:   •  amLODIPine (NORVASC) 5 MG tablet, Take 1 tablet by mouth Daily., Disp: 30 tablet, Rfl: 5  •  amoxicillin (AMOXIL) 500 MG capsule, Take 1 capsule by mouth 2 (Two) Times a Day., Disp: 20 capsule, Rfl: 0  •  fluticasone (FLONASE) 50 MCG/ACT nasal spray, 2 sprays into each nostril Daily. Administer 2 sprays in each nostril for each dose., Disp: 1 bottle, Rfl: 2  •  HYDROcodone-acetaminophen (NORCO) 7.5-325 MG per tablet, Take 1-2 tablets by mouth Every 6 (Six) Hours As Needed., Disp: 24 tablet, Rfl: 0  •  promethazine (PHENERGAN) 12.5 MG tablet, Take 1 tablet by mouth Every 8 (Eight) Hours As Needed for Nausea or Vomiting., Disp: 10 tablet, Rfl: 1  •  ibuprofen (ADVIL,MOTRIN) 800 MG tablet, Take 1 tablet by mouth Every 8 (Eight) Hours As Needed for Mild Pain  for up to 14 days., Disp: 60 tablet, Rfl: 0    Current Facility-Administered Medications:   •  cefTRIAXone (ROCEPHIN) injection 500 mg, 500 mg, Intramuscular, Once, Josette Ceballos APRN  •  dexamethasone (DECADRON) injection 8 mg, 8 mg, Intramuscular, Once, " Josette Ceballos APRN PE    Physical Exam   Constitutional: She is oriented to person, place, and time. She appears well-developed and well-nourished.   HENT:   Head: Normocephalic and atraumatic.   Neck: Normal range of motion. Neck supple.   Cardiovascular: Normal rate, regular rhythm and normal heart sounds.   Pulmonary/Chest: Effort normal. She has wheezes. She has rales.   Lymphadenopathy:     She has cervical adenopathy.   Neurological: She is alert and oriented to person, place, and time.   Skin: Skin is warm and dry. Capillary refill takes less than 2 seconds.   Psychiatric: She has a normal mood and affect. Her behavior is normal. Judgment and thought content normal.        A/P    Inez was seen today for flank pain.    Diagnoses and all orders for this visit:    Upper respiratory tract infection, unspecified type  -     cefTRIAXone (ROCEPHIN) injection 500 mg; Inject 500 mg into the appropriate muscle as directed by prescriber 1 (One) Time.    Pleurisy  -     dexamethasone (DECADRON) injection 8 mg; Inject 2 mL into the appropriate muscle as directed by prescriber 1 (One) Time.  -     ibuprofen (ADVIL,MOTRIN) 800 MG tablet; Take 1 tablet by mouth Every 8 (Eight) Hours As Needed for Mild Pain  for up to 14 days.        Plan of care reviewed with patient at the conclusion of today's visit. Education was provided regarding diagnosis, management and any prescribed or recommended OTC medications.  Patient verbalizes understanding of and agreement with management plan.    Return if symptoms worsen or fail to improve.     STEVE Michel

## 2018-12-14 DIAGNOSIS — J06.9 ACUTE URI: Primary | ICD-10-CM

## 2018-12-15 NOTE — PROGRESS NOTES
Patient called and stated that her pleuritic pain is better but she is still having it and would like to have a chest xray.

## 2018-12-16 ENCOUNTER — HOSPITAL ENCOUNTER (OUTPATIENT)
Dept: GENERAL RADIOLOGY | Facility: HOSPITAL | Age: 63
Discharge: HOME | End: 2018-12-16
Admitting: NURSE PRACTITIONER

## 2018-12-16 PROCEDURE — 71046 X-RAY EXAM CHEST 2 VIEWS: CPT

## 2018-12-17 ENCOUNTER — OFFICE VISIT (OUTPATIENT)
Dept: FAMILY MEDICINE CLINIC | Facility: CLINIC | Age: 63
End: 2018-12-17

## 2018-12-17 ENCOUNTER — TELEPHONE (OUTPATIENT)
Dept: FAMILY MEDICINE CLINIC | Facility: CLINIC | Age: 63
End: 2018-12-17

## 2018-12-17 VITALS
OXYGEN SATURATION: 98 % | HEART RATE: 103 BPM | TEMPERATURE: 97.6 F | DIASTOLIC BLOOD PRESSURE: 80 MMHG | WEIGHT: 198 LBS | RESPIRATION RATE: 16 BRPM | HEIGHT: 68 IN | BODY MASS INDEX: 30.01 KG/M2 | SYSTOLIC BLOOD PRESSURE: 132 MMHG

## 2018-12-17 DIAGNOSIS — J18.9 PNEUMONIA OF LEFT UPPER LOBE DUE TO INFECTIOUS ORGANISM: Primary | ICD-10-CM

## 2018-12-17 PROCEDURE — 99213 OFFICE O/P EST LOW 20 MIN: CPT | Performed by: NURSE PRACTITIONER

## 2018-12-17 PROCEDURE — 96372 THER/PROPH/DIAG INJ SC/IM: CPT | Performed by: NURSE PRACTITIONER

## 2018-12-17 RX ORDER — CEFTRIAXONE SODIUM 250 MG/1
500 INJECTION, POWDER, FOR SOLUTION INTRAMUSCULAR; INTRAVENOUS ONCE
Status: COMPLETED | OUTPATIENT
Start: 2018-12-17 | End: 2018-12-17

## 2018-12-17 RX ORDER — PREDNISONE 20 MG/1
20 TABLET ORAL 2 TIMES DAILY
Qty: 10 TABLET | Refills: 0 | Status: SHIPPED | OUTPATIENT
Start: 2018-12-17 | End: 2018-12-22

## 2018-12-17 RX ADMIN — CEFTRIAXONE SODIUM 500 MG: 250 INJECTION, POWDER, FOR SOLUTION INTRAMUSCULAR; INTRAVENOUS at 14:29

## 2018-12-17 NOTE — PROGRESS NOTES
Patient had positive chest xray for pneumonia. Putting in an order for another chest xray when she is finished with her antibiotics. Called patient and advised.

## 2018-12-17 NOTE — PROGRESS NOTES
Inez Ladd is a 63 y.o. female who presents for a recheck of her pneumonia.    Chief Complaint   Patient presents with   • Pneumonia   • pain in Left flank       Patient felt better after the shots on Friday but the pain returned today. Discussed with her the chest xray results.          Past Medical History:   Diagnosis Date   • Acute cystitis    • Acute pharyngitis    • Body aches    • Cellulitis    • Dysuria    • H/O bladder infections     2 SINCE AUGUST, urinary tract infection   • History of prolapse of bladder      Bladder disorder   • Hypertension    • Impacted cerumen     Impacted cerumen, unspecified ear    • Infected insect bite of forearm    • Menopause    • Prophylactic immunotherapy        Past Surgical History:   Procedure Laterality Date   • CARPAL TUNNEL RELEASE     • KNEE SURGERY     • NO PAST SURGERIES         Family History   Problem Relation Age of Onset   • Coronary artery disease Other    • Coronary artery disease Mother    • Hypertension Mother    • Dementia Mother    • No Known Problems Father        Social History     Socioeconomic History   • Marital status:      Spouse name: Not on file   • Number of children: Not on file   • Years of education: Not on file   • Highest education level: Not on file   Social Needs   • Financial resource strain: Not on file   • Food insecurity - worry: Not on file   • Food insecurity - inability: Not on file   • Transportation needs - medical: Not on file   • Transportation needs - non-medical: Not on file   Occupational History   • Occupation: Nurse     Comment: [employer]   Tobacco Use   • Smoking status: Former Smoker     Types: Cigarettes     Last attempt to quit: 1998     Years since quittin.9   • Smokeless tobacco: Never Used   Substance and Sexual Activity   • Alcohol use: Yes     Comment: Occasional alcohol use    • Drug use: No   • Sexual activity: Defer   Other Topics Concern   • Not on file   Social History Narrative   • Not on  "file       No Known Allergies    ROS    Review of Systems   Respiratory: Positive for chest tightness and shortness of breath.    Musculoskeletal: Positive for arthralgias.   All other systems reviewed and are negative.      Vitals:    12/17/18 1349   BP: 132/80   Pulse: 103   Resp: 16   Temp: 97.6 °F (36.4 °C)   TempSrc: Temporal   SpO2: 98%   Weight: 89.8 kg (198 lb)   Height: 172.7 cm (68\")   PainSc:   8   PainLoc: Rib Cage         Current Outpatient Medications:   •  acetaminophen (TYLENOL) 325 MG tablet, Take 650 mg by mouth As Needed for Mild Pain ., Disp: , Rfl:   •  amLODIPine (NORVASC) 5 MG tablet, Take 1 tablet by mouth Daily., Disp: 30 tablet, Rfl: 5  •  amoxicillin (AMOXIL) 500 MG capsule, Take 1 capsule by mouth 2 (Two) Times a Day., Disp: 20 capsule, Rfl: 0  •  fluticasone (FLONASE) 50 MCG/ACT nasal spray, 2 sprays into each nostril Daily. Administer 2 sprays in each nostril for each dose., Disp: 1 bottle, Rfl: 2  •  HYDROcodone-acetaminophen (NORCO) 7.5-325 MG per tablet, Take 1-2 tablets by mouth Every 6 (Six) Hours As Needed., Disp: 24 tablet, Rfl: 0  •  ibuprofen (ADVIL,MOTRIN) 800 MG tablet, Take 1 tablet by mouth Every 8 (Eight) Hours As Needed for Mild Pain  for up to 14 days., Disp: 60 tablet, Rfl: 0  •  promethazine (PHENERGAN) 12.5 MG tablet, Take 1 tablet by mouth Every 8 (Eight) Hours As Needed for Nausea or Vomiting., Disp: 10 tablet, Rfl: 1  •  predniSONE (DELTASONE) 20 MG tablet, Take 1 tablet by mouth 2 (Two) Times a Day for 5 days., Disp: 10 tablet, Rfl: 0  No current facility-administered medications for this visit.     PE    Physical Exam   Constitutional: She is oriented to person, place, and time. She appears well-developed and well-nourished.   HENT:   Head: Normocephalic and atraumatic.   Neck: Normal range of motion. Neck supple.   Cardiovascular: Normal rate, regular rhythm and normal heart sounds. Exam reveals no gallop and no friction rub.   No murmur heard.  Pulmonary/Chest: " Effort normal. She has decreased breath sounds in the left upper field. She exhibits tenderness.       Musculoskeletal: Normal range of motion.   Neurological: She is alert and oriented to person, place, and time.   Skin: Skin is warm and dry. Capillary refill takes less than 2 seconds.   Psychiatric: She has a normal mood and affect. Her behavior is normal. Judgment and thought content normal.   Nursing note and vitals reviewed.       A/P    Inez was seen today for pneumonia and pain in left flank.    Diagnoses and all orders for this visit:    Pneumonia of left upper lobe due to infectious organism (CMS/[facility])  -     cefTRIAXone (ROCEPHIN) injection 500 mg; Inject 500 mg into the appropriate muscle as directed by prescriber 1 (One) Time.  -     predniSONE (DELTASONE) 20 MG tablet; Take 1 tablet by mouth 2 (Two) Times a Day for 5 days.        Plan of care reviewed with patient at the conclusion of today's visit. Education was provided regarding diagnosis, management and any prescribed or recommended OTC medications.  Patient verbalizes understanding of and agreement with management plan.    Patient will have another chest xray on Friday to recheck pneumonia.    STEVE Michel

## 2018-12-17 NOTE — PROGRESS NOTES
Please call the patient regarding her abnormal result. Let her know that her chest xray did show that she had some small patches of pneumonia on her left side where she is hurting but we gave her the shot and she is taking the Amoxicillin so she has had the appropriate treatment. If she needs anything just let me know. We will need to perform another chest xray when she's finished with her antibiotic. I will put in an order and she can go after she takes her last pill.  Thanks,  Josette

## 2018-12-17 NOTE — TELEPHONE ENCOUNTER
----- Message from STEVE Martinez sent at 12/17/2018  1:10 PM EST -----  Please call the patient regarding her abnormal result. Let her know that her chest xray did show that she had some small patches of pneumonia on her left side where she is hurting but we gave her the shot and she is taking the Amoxicillin so she has had the appropriate treatment. If she needs anything just let me know. We will need to perform another chest xray when she's finished with her antibiotic. I will put in an order and she can go after she takes her last pill.  Thanks,  Josette

## 2018-12-21 ENCOUNTER — HOSPITAL ENCOUNTER (OUTPATIENT)
Dept: GENERAL RADIOLOGY | Facility: HOSPITAL | Age: 63
Discharge: HOME | End: 2018-12-21
Admitting: NURSE PRACTITIONER

## 2018-12-21 ENCOUNTER — TELEPHONE (OUTPATIENT)
Dept: FAMILY MEDICINE CLINIC | Facility: CLINIC | Age: 63
End: 2018-12-21

## 2018-12-21 DIAGNOSIS — J18.9 PNEUMONIA OF LEFT UPPER LOBE DUE TO INFECTIOUS ORGANISM: Primary | ICD-10-CM

## 2018-12-21 DIAGNOSIS — J18.9 PNEUMONIA OF LEFT UPPER LOBE DUE TO INFECTIOUS ORGANISM: ICD-10-CM

## 2018-12-21 PROCEDURE — 71046 X-RAY EXAM CHEST 2 VIEWS: CPT

## 2018-12-21 RX ORDER — LEVOFLOXACIN 500 MG/1
500 TABLET, FILM COATED ORAL DAILY
Qty: 7 TABLET | Refills: 0 | Status: SHIPPED | OUTPATIENT
Start: 2018-12-21 | End: 2018-12-29 | Stop reason: SDUPTHER

## 2018-12-21 NOTE — TELEPHONE ENCOUNTER
----- Message from STEVE Martinez sent at 12/21/2018 12:53 PM EST -----  Regarding: RE: X RAY READ  Contact: [phone]  Please let her know I called one in. Thank you  ----- Message -----  From: Jennifer Long MA  Sent: 12/21/2018   8:50 AM  To: STEVE Martinez  Subject: FW: X RAY READ                                   Please advise.       ----- Message -----  From: Svitlana Babcock RegSched Rep  Sent: 12/21/2018   8:42 AM  To: Jennifer Long MA  Subject: X RAY READ                                       PT STATES SHANNA PATEL TOLD HER SHE WOULD CALL IN MORE ANTIBIOTIC AFTER READING HER CHEST X RAY TODAY, PLEASE CALL

## 2018-12-29 ENCOUNTER — OFFICE VISIT (OUTPATIENT)
Dept: FAMILY MEDICINE CLINIC | Facility: CLINIC | Age: 63
End: 2018-12-29

## 2018-12-29 VITALS
BODY MASS INDEX: 29.49 KG/M2 | RESPIRATION RATE: 18 BRPM | WEIGHT: 199.13 LBS | DIASTOLIC BLOOD PRESSURE: 80 MMHG | HEART RATE: 102 BPM | OXYGEN SATURATION: 98 % | TEMPERATURE: 97.3 F | HEIGHT: 69 IN | SYSTOLIC BLOOD PRESSURE: 120 MMHG

## 2018-12-29 DIAGNOSIS — J18.9 PNEUMONIA OF LEFT LUNG DUE TO INFECTIOUS ORGANISM, UNSPECIFIED PART OF LUNG: ICD-10-CM

## 2018-12-29 DIAGNOSIS — R06.02 SHORTNESS OF BREATH: Primary | ICD-10-CM

## 2018-12-29 DIAGNOSIS — J18.9 PNEUMONIA OF LEFT UPPER LOBE DUE TO INFECTIOUS ORGANISM: ICD-10-CM

## 2018-12-29 DIAGNOSIS — R07.1 INSPIRATORY PAIN: ICD-10-CM

## 2018-12-29 DIAGNOSIS — R93.89 ABNORMAL CHEST X-RAY: ICD-10-CM

## 2018-12-29 LAB
HCT VFR BLDA CALC: 41.9 % (ref 38–51)
HGB BLDA-MCNC: 13.4 G/DL (ref 12–17)
MCH, POC: 28.6
MCHC, POC: 32
MCV, POC: 89.6
PLATELET # BLD AUTO: 274 10*3/MM3
PMV BLD: 7.7 FL
RBC, POC: 4.68
RDW, POC: 13.2
WBC # BLD: 7 10*3/UL

## 2018-12-29 PROCEDURE — 99214 OFFICE O/P EST MOD 30 MIN: CPT | Performed by: NURSE PRACTITIONER

## 2018-12-29 PROCEDURE — 96372 THER/PROPH/DIAG INJ SC/IM: CPT | Performed by: NURSE PRACTITIONER

## 2018-12-29 PROCEDURE — 85027 COMPLETE CBC AUTOMATED: CPT | Performed by: NURSE PRACTITIONER

## 2018-12-29 RX ORDER — LEVOFLOXACIN 500 MG/1
500 TABLET, FILM COATED ORAL DAILY
Qty: 7 TABLET | Refills: 0 | Status: SHIPPED | OUTPATIENT
Start: 2018-12-29 | End: 2019-01-05

## 2018-12-29 RX ORDER — METHYLPREDNISOLONE ACETATE 40 MG/ML
40 INJECTION, SUSPENSION INTRA-ARTICULAR; INTRALESIONAL; INTRAMUSCULAR; SOFT TISSUE ONCE
Status: COMPLETED | OUTPATIENT
Start: 2018-12-29 | End: 2018-12-29

## 2018-12-29 RX ADMIN — METHYLPREDNISOLONE ACETATE 40 MG: 40 INJECTION, SUSPENSION INTRA-ARTICULAR; INTRALESIONAL; INTRAMUSCULAR; SOFT TISSUE at 16:00

## 2018-12-29 NOTE — PATIENT INSTRUCTIONS
Chest Wall Pain  Chest wall pain is pain in or around the bones and muscles of your chest. Sometimes, an injury causes this pain. Sometimes, the cause may not be known. This pain may take several weeks or longer to get better.  Follow these instructions at home:  Pay attention to any changes in your symptoms. Take these actions to help with your pain:  · Rest as told by your health care provider.  · Avoid activities that cause pain. These include any activities that use your chest muscles or your abdominal and side muscles to lift heavy items.  · If directed, apply ice to the painful area:  ? Put ice in a plastic bag.  ? Place a towel between your skin and the bag.  ? Leave the ice on for 20 minutes, 2-3 times per day.  · Take over-the-counter and prescription medicines only as told by your health care provider.  · Do not use tobacco products, including cigarettes, chewing tobacco, and e-cigarettes. If you need help quitting, ask your health care provider.  · Keep all follow-up visits as told by your health care provider. This is important.    Contact a health care provider if:  · You have a fever.  · Your chest pain becomes worse.  · You have new symptoms.  Get help right away if:  · You have nausea or vomiting.  · You feel sweaty or light-headed.  · You have a cough with phlegm (sputum) or you cough up blood.  · You develop shortness of breath.  This information is not intended to replace advice given to you by your health care provider. Make sure you discuss any questions you have with your health care provider.  Document Released: 12/18/2006 Document Revised: 04/27/2017 Document Reviewed: 03/14/2016  [employer] Interactive Patient Education © 2018 [employer]    Community-Acquired Pneumonia, Adult  Pneumonia is an infection of the lungs. There are different types of pneumonia. One type can develop while a person is in a hospital. A different type, called community-acquired pneumonia, develops in people who are  not, or have not recently been, in the hospital or other health care facility.  What are the causes?  Pneumonia may be caused by bacteria, viruses, or funguses. Community-acquired pneumonia is often caused by Streptococcus pneumonia bacteria. These bacteria are often passed from one person to another by breathing in droplets from the cough or sneeze of an infected person.  What increases the risk?  The condition is more likely to develop in:  · People who have chronic diseases, such as chronic obstructive pulmonary disease (COPD), asthma, congestive heart failure, cystic fibrosis, diabetes, or kidney disease.  · People who have early-stage or late-stage HIV.  · People who have sickle cell disease.  · People who have had their spleen removed (splenectomy).  · People who have poor dental hygiene.  · People who have medical conditions that increase the risk of breathing in (aspirating) secretions their own mouth and nose.  · People who have a weakened immune system (immunocompromised).  · People who smoke.  · People who travel to areas where pneumonia-causing germs commonly exist.  · People who are around animal habitats or animals that have pneumonia-causing germs, including birds, bats, rabbits, cats, and farm animals.    What are the signs or symptoms?  Symptoms of this condition include:  · A dry cough.  · A wet (productive) cough.  · Fever.  · Sweating.  · Chest pain, especially when breathing deeply or coughing.  · Rapid breathing or difficulty breathing.  · Shortness of breath.  · Shaking chills.  · Fatigue.  · Muscle aches.    How is this diagnosed?  Your health care provider will take a medical history and perform a physical exam. You may also have other tests, including:  · Imaging studies of your chest, including X-rays.  · Tests to check your blood oxygen level and other blood gases.  · Other tests on blood, mucus (sputum), fluid around your lungs (pleural fluid), and urine.    If your pneumonia is severe,  other tests may be done to identify the specific cause of your illness.  How is this treated?  The type of treatment that you receive depends on many factors, such as the cause of your pneumonia, the medicines you take, and other medical conditions that you have. For most adults, treatment and recovery from pneumonia may occur at home. In some cases, treatment must happen in a hospital. Treatment may include:  · Antibiotic medicines, if the pneumonia was caused by bacteria.  · Antiviral medicines, if the pneumonia was caused by a virus.  · Medicines that are given by mouth or through an IV tube.  · Oxygen.  · Respiratory therapy.    Although rare, treating severe pneumonia may include:  · Mechanical ventilation. This is done if you are not breathing well on your own and you cannot maintain a safe blood oxygen level.  · Thoracentesis. This procedure removes fluid around one lung or both lungs to help you breathe better.    Follow these instructions at home:  · Take over-the-counter and prescription medicines only as told by your health care provider.  ? Only take cough medicine if you are losing sleep. Understand that cough medicine can prevent your body’s natural ability to remove mucus from your lungs.  ? If you were prescribed an antibiotic medicine, take it as told by your health care provider. Do not stop taking the antibiotic even if you start to feel better.  · Sleep in a semi-upright position at night. Try sleeping in a reclining chair, or place a few pillows under your head.  · Do not use tobacco products, including cigarettes, chewing tobacco, and e-cigarettes. If you need help quitting, ask your health care provider.  · Drink enough water to keep your urine clear or pale yellow. This will help to thin out mucus secretions in your lungs.  How is this prevented?  There are ways that you can decrease your risk of developing community-acquired pneumonia. Consider getting a pneumococcal vaccine if:  · You are  older than 65 years of age.  · You are older than 19 years of age and are undergoing cancer treatment, have chronic lung disease, or have other medical conditions that affect your immune system. Ask your health care provider if this applies to you.    There are different types and schedules of pneumococcal vaccines. Ask your health care provider which vaccination option is best for you.  You may also prevent community-acquired pneumonia if you take these actions:  · Get an influenza vaccine every year. Ask your health care provider which type of influenza vaccine is best for you.  · Go to the dentist on a regular basis.  · Wash your hands often. Use hand  if soap and water are not available.    Contact a health care provider if:  · You have a fever.  · You are losing sleep because you cannot control your cough with cough medicine.  Get help right away if:  · You have worsening shortness of breath.  · You have increased chest pain.  · Your sickness becomes worse, especially if you are an older adult or have a weakened immune system.  · You cough up blood.  This information is not intended to replace advice given to you by your health care provider. Make sure you discuss any questions you have with your health care provider.  Document Released: 12/18/2006 Document Revised: 04/27/2017 Document Reviewed: 04/13/2016  [employer] Interactive Patient Education © 2018 [employer]    Costochondritis  Costochondritis is swelling and irritation (inflammation) of the tissue (cartilage) that connects your ribs to your breastbone (sternum). This causes pain in the front of your chest. The pain usually starts gradually and involves more than one rib.  What are the causes?  The exact cause of this condition is not always known. It results from stress on the cartilage where your ribs attach to your sternum. The cause of this stress could be:  · Chest injury (trauma).  · Exercise or activity, such as lifting.  · Severe  coughing.    What increases the risk?  You may be at higher risk for this condition if you:  · Are female.  · Are 30?40 years old.  · Recently started a new exercise or work activity.  · Have low levels of vitamin D.  · Have a condition that makes you cough frequently.    What are the signs or symptoms?  The main symptom of this condition is chest pain. The pain:  · Usually starts gradually and can be sharp or dull.  · Gets worse with deep breathing, coughing, or exercise.  · Gets better with rest.  · May be worse when you press on the sternum-rib connection (tenderness).    How is this diagnosed?  This condition is diagnosed based on your symptoms, medical history, and a physical exam. Your health care provider will check for tenderness when pressing on your sternum. This is the most important finding. You may also have tests to rule out other causes of chest pain. These may include:  · A chest X-ray to check for lung problems.  · An electrocardiogram (ECG) to see if you have a heart problem that could be causing the pain.  · An imaging scan to rule out a chest or rib fracture.    How is this treated?  This condition usually goes away on its own over time. Your health care provider may prescribe an NSAID to reduce pain and inflammation. Your health care provider may also suggest that you:  · Rest and avoid activities that make pain worse.  · Apply heat or cold to the area to reduce pain and inflammation.  · Do exercises to stretch your chest muscles.    If these treatments do not help, your health care provider may inject a numbing medicine at the sternum-rib connection to help relieve the pain.  Follow these instructions at home:  · Avoid activities that make pain worse. This includes any activities that use chest, abdominal, and side muscles.  · If directed, put ice on the painful area:  ? Put ice in a plastic bag.  ? Place a towel between your skin and the bag.  ? Leave the ice on for 20 minutes, 2-3 times a  day.  · If directed, apply heat to the affected area as often as told by your health care provider. Use the heat source that your health care provider recommends, such as a moist heat pack or a heating pad.  ? Place a towel between your skin and the heat source.  ? Leave the heat on for 20-30 minutes.  ? Remove the heat if your skin turns bright red. This is especially important if you are unable to feel pain, heat, or cold. You may have a greater risk of getting burned.  · Take over-the-counter and prescription medicines only as told by your health care provider.  · Return to your normal activities as told by your health care provider. Ask your health care provider what activities are safe for you.  · Keep all follow-up visits as told by your health care provider. This is important.  Contact a health care provider if:  · You have chills or a fever.  · Your pain does not go away or it gets worse.  · You have a cough that does not go away (is persistent).  Get help right away if:  · You have shortness of breath.  This information is not intended to replace advice given to you by your health care provider. Make sure you discuss any questions you have with your health care provider.  Document Released: 09/27/2006 Document Revised: 07/07/2017 Document Reviewed: 04/12/2017  [employer] Interactive Patient Education © 2018 [employer]    Shortness of Breath, Adult  Shortness of breath is when a person has trouble breathing enough air, or when a person feels like she or he is having trouble breathing in enough air. Shortness of breath could be a sign of medical problem.  Follow these instructions at home:  Pay attention to any changes in your symptoms. Take these actions to help with your condition:  · Do not smoke. Smoking is a common cause of shortness of breath. If you smoke and you need help quitting, ask your health care provider.  · Avoid things that can irritate your airways, such as:  ? Mold.  ? Dust.  ? Air  pollution.  ? Chemical fumes.  ? Things that can cause allergy symptoms (allergens), if you have allergies.  · Keep your living space clean and free of mold and dust.  · Rest as needed. Slowly return to your usual activities.  · Take over-the-counter and prescription medicines, including oxygen and inhaled medicines, only as told by your health care provider.  · Keep all follow-up visits as told by your health care provider. This is important.    Contact a health care provider if:  · Your condition does not improve as soon as expected.  · You have a hard time doing your normal activities, even after you rest.  · You have new symptoms.  Get help right away if:  · Your shortness of breath gets worse.  · You have shortness of breath when you are resting.  · You feel light-headed or you faint.  · You have a cough that is not controlled with medicines.  · You cough up blood.  · You have pain with breathing.  · You have pain in your chest, arms, shoulders, or abdomen.  · You have a fever.  · You cannot walk up stairs or exercise the way that you normally do.  This information is not intended to replace advice given to you by your health care provider. Make sure you discuss any questions you have with your health care provider.  Document Released: 09/12/2002 Document Revised: 07/08/2017 Document Reviewed: 05/25/2017  [employer] Interactive Patient Education © 2018 [employer]  Levofloxacin tablets  What is this medicine?  LEVOFLOXACIN (toño voe FLOX a sin) is a quinolone antibiotic. It is used to treat certain kinds of bacterial infections. It will not work for colds, flu, or other viral infections.  This medicine may be used for other purposes; ask your health care provider or pharmacist if you have questions.  COMMON BRAND NAME(S): Levaquin, Levaquin Leva-Vitaliy  What should I tell my health care provider before I take this medicine?  They need to know if you have any of these conditions:  -bone problems  -diabetes  -history  of low levels of potassium in the blood  -irregular heartbeat  -joint problems  -kidney disease  -liver disease  -myasthenia gravis  -seizures  -tendon problems  -tingling of the fingers or toes, or other nerve disorder  -an unusual or allergic reaction to levofloxacin, other quinolone antibiotics, foods, dyes, or preservatives  -pregnant or trying to get pregnant  -breast-feeding  How should I use this medicine?  Take this medicine by mouth with a full glass of water. Follow the directions on the prescription label. This medicine can be taken with or without food. Take your medicine at regular intervals. Do not take your medicine more often than directed. Do not skip doses or stop your medicine early even if you feel better. Do not stop taking except on your doctor's advice.  A special MedGuide will be given to you by the pharmacist with each prescription and refill. Be sure to read this information carefully each time.  Talk to your pediatrician regarding the use of this medicine in children. While this drug may be prescribed for children as young as 6 months for selected conditions, precautions do apply.  Overdosage: If you think you have taken too much of this medicine contact a poison control center or emergency room at once.  NOTE: This medicine is only for you. Do not share this medicine with others.  What if I miss a dose?  If you miss a dose, take it as soon as you remember. If it is almost time for your next dose, take only that dose. Do not take double or extra doses.  What may interact with this medicine?  Do not take this medicine with any of the following medications:  -bepridil  -certain medicines for depression, anxiety, or psychotic disturbances like pimozide, thioridazine, and ziprasidone  -certain medicines for irregular heart beat like dofetilide and dronedarone  -cisapride  -halofantrine  This medicine may also interact with the following medications:  -antacids  -birth control pills  -certain  medicines for diabetes, like glipizide, glyburide, or insulin  -didanosine buffered tablets or powder  -multivitamins  -NSAIDS, medicines for pain and inflammation, like ibuprofen or naproxen  -steroid medicines like prednisone or cortisone  -sucralfate  -theophylline  -warfarin  This list may not describe all possible interactions. Give your health care provider a list of all the medicines, herbs, non-prescription drugs, or dietary supplements you use. Also tell them if you smoke, drink alcohol, or use illegal drugs. Some items may interact with your medicine.  What should I watch for while using this medicine?  Tell your doctor or healthcare professional if your symptoms do not start to get better or if they get worse.  Do not treat diarrhea with over the counter products. Contact your doctor if you have diarrhea that lasts more than 2 days or if it is severe and watery.  Check with your doctor or health care professional if you get an attack of severe diarrhea, nausea and vomiting, or if you sweat a lot. The loss of too much body fluid can make it dangerous for you to take this medicine.  You may get drowsy or dizzy. Do not drive, use machinery, or do anything that needs mental alertness until you know how this medicine affects you. Do not sit or stand up quickly, especially if you are an older patient. This reduces the risk of dizzy or fainting spells.  This medicine can make you more sensitive to the sun. Keep out of the sun. If you cannot avoid being in the sun, wear protective clothing and use a sunscreen. Do not use sun lamps or tanning beds/booths. Contact your doctor if you get a sunburn.  If you are a diabetic monitor your blood glucose carefully. If you get an unusual reading stop taking this medicine and call your doctor right away.  Avoid antacids, calcium, iron, and zinc products for 2 hours before and 2 hours after taking a dose of this medicine.  What side effects may I notice from receiving this  medicine?  Side effects that you should report to your doctor or health care professional as soon as possible:  -allergic reactions like skin rash or hives, swelling of the face, lips, or tongue  -anxious  -breathing problems  -confusion  -depressed mood  -diarrhea  -dizziness  -fast, irregular heartbeat  -hallucination, loss of contact with reality  -joint, muscle, or tendon pain or swelling  -muscle weakness  -pain, tingling, numbness in the hands or feet  -seizures  -signs and symptoms of high blood sugar such as dizziness; dry mouth; dry skin; fruity breath; nausea; stomach pain; increased hunger or thirst; increased urination  -signs and symptoms of liver injury like dark yellow or brown urine; general ill feeling or flu-like symptoms; light-colored stools; loss of appetite; nausea; right upper belly pain; unusually weak or tired; yellowing of the eyes or skin  -signs and symptoms of low blood sugar such as feeling anxious; confusion; dizziness; increased hunger; unusually weak or tired; sweating; shakiness; cold; irritable; headache; blurred vision; fast heartbeat; loss of consciousness  -suicidal thoughts or other mood changes  -sunburn  -unusually weak or tired  Side effects that usually do not require medical attention (report to your doctor or health care professional if they continue or are bothersome):  -constipation  -dry mouth  -headache  -nausea, vomiting  -trouble sleeping  This list may not describe all possible side effects. Call your doctor for medical advice about side effects. You may report side effects to FDA at 8-701-FDA-8753.  Where should I keep my medicine?  Keep out of the reach of children.  Store at room temperature between 15 and 30 degrees C (59 and 86 degrees F). Keep in a tightly closed container. Throw away any unused medicine after the expiration date.  NOTE: This sheet is a summary. It may not cover all possible information. If you have questions about this medicine, talk to your  doctor, pharmacist, or health care provider.  © 2018 [employer] (2017-06-27 12:38:27)  Fluticasone inhalation powder  What is this medicine?  FLUTICASONE (floo TIK a sone) inhalation powder is a corticosteroid. It helps decrease inflammation in your lungs. This medicine is used to treat the symptoms of asthma. Never use this medicine for an acute asthma attack.  This medicine may be used for other purposes; ask your health care provider or pharmacist if you have questions.  COMMON BRAND NAME(S): ARMONAIR RESPICLICK, ARNUITY ELLIPTA, Flovent Diskus, Flovent Rotadisk  What should I tell my health care provider before I take this medicine?  They need to know if you have any of the following conditions:  -bone problems  -immune system problems  -infection, like chickenpox, tuberculosis, herpes, or fungal infection  -recent surgery or injury of mouth or throat  -taking corticosteroids by mouth  -an unusual or allergic reaction to fluticasone, lactose, other medicines, foods, dyes, or preservatives  -pregnant or trying to get pregnant  -breast-feeding  How should I use this medicine?  This medicine is for inhalation through the mouth. Rinse your mouth with water after use. Make sure not to swallow the water. Follow the directions on your prescription label. Do not use with a spacer device. Do not use more often than directed. Make sure that you are using your inhaler correctly. Ask you doctor or health care provider if you have any questions.  Talk to your pediatrician regarding the use of this medicine in children. While this drug may be prescribed for children for selected conditions, precautions do apply.  Overdosage: If you think you have taken too much of this medicine contact a poison control center or emergency room at once.  NOTE: This medicine is only for you. Do not share this medicine with others.  What if I miss a dose?  If you miss a dose, use it as soon as you remember. If it is almost time for  your next dose, use only that dose and continue with your regular schedule, spacing doses evenly. Do not use double or extra doses.  What may interact with this medicine?  -antiviral medicines for HIV or AIDS  -certain antibiotics like clarithromycin and telithromycin  -certain medicines for fungal infections like ketoconazole, itraconazole, posaconazole, voriconazole  -conivaptan  -ketoconazole  -nefazodone  This list may not describe all possible interactions. Give your health care provider a list of all the medicines, herbs, non-prescription drugs, or dietary supplements you use. Also tell them if you smoke, drink alcohol, or use illegal drugs. Some items may interact with your medicine.  What should I watch for while using this medicine?  Visit your doctor or health care professional for regular checks on your progress. Check with your health care professional if your symptoms do not improve. If your symptoms get worse or if you need your short-acting inhalers more often, call your doctor right away. Do not stop taking your medicine unless your doctor tells you to.  Do not come in contact with people who have the chickenpox or the measles while you are taking this medicine. If you do, call your doctor right away.  Your inhaler has a dose counter and will tell you when only a few doses are left.  What side effects may I notice from receiving this medicine?  Side effects that you should report to your doctor or health care professional as soon as possible:  -allergic reactions like skin rash, itching or hives, swelling of the face, lips, or tongue  -breathing problems  -changes in vision  -flu-like symptoms  -unusual swelling  -white patches or sores in the mouth or throat  Side effects that usually do not require medical attention (report to your doctor or health care professional if they continue or are bothersome):  -coughing, hoarseness, or throat irritation  -dry mouth  -headache  -flushing  -loss of taste,  or unpleasant taste  This list may not describe all possible side effects. Call your doctor for medical advice about side effects. You may report side effects to FDA at 7-963-FDA-4080.  Where should I keep my medicine?  Keep out of the reach of children.  Store at room temperature between 15 and 30 degrees C (59 and 86 degrees F). Keep dry. Protect from heat and direct sunlight. Discard 6 weeks after removal from the foil pouch or after all of the blisters have been used (when the dose indicator reads 0), whichever comes first.  NOTE: This sheet is a summary. It may not cover all possible information. If you have questions about this medicine, talk to your doctor, pharmacist, or health care provider.  © 2018 [employer] Standard (2017-12-06 16:05:49)  Methylprednisolone Solution for Injection  What is this medicine?  METHYLPREDNISOLONE (meth ill pred NISS oh lone) is a corticosteroid. It is commonly used to treat inflammation of the skin, joints, lungs, and other organs. Common conditions treated include asthma, allergies, and arthritis. It is also used for other conditions, such as blood disorders and diseases of the adrenal glands.  This medicine may be used for other purposes; ask your health care provider or pharmacist if you have questions.  COMMON BRAND NAME(S): A-Methapred, Solu-Medrol  What should I tell my health care provider before I take this medicine?  They need to know if you have any of these conditions:  -Cushing's syndrome  -eye disease, vision problems  -diabetes  -glaucoma  -heart disease  -high blood pressure  -infection (especially a virus infection such as chickenpox, cold sores, or herpes)  -liver disease  -mental illness  -myasthenia gravis  -osteoporosis  -recently received or scheduled to receive a vaccine  -seizures  -stomach or intestine problems  -thyroid disease  -an unusual or allergic reaction to lactose, methylprednisolone, other medicines, foods, dyes, or preservatives  -pregnant  or trying to get pregnant  -breast-feeding  How should I use this medicine?  This medicine is for injection or infusion into a vein. It is also for injection into a muscle. It is given by a health care professional in a hospital or clinic setting.  Talk to your pediatrician regarding the use of this medicine in children. While this drug may be prescribed for selected conditions, precautions do apply.  Overdosage: If you think you have taken too much of this medicine contact a poison control center or emergency room at once.  NOTE: This medicine is only for you. Do not share this medicine with others.  What if I miss a dose?  This does not apply.  What may interact with this medicine?  Do not take this medicine with any of the following medications:  -alefacept  -echinacea  -iopamidol  -live virus vaccines  -metyrapone  -mifepristone  This medicine may also interact with the following medications:  -amphotericin B  -aspirin and aspirin-like medicines  -certain antibiotics like erythromycin, clarithromycin, troleandomycin  -certain medicines for diabetes  -certain medicines for fungal infection like ketoconazole  -certain medicines for seizures like carbamazepine, phenobarbital, phenytoin  -certain medicines that treat or prevent blood clots like warfarin  -cyclosporine  -digoxin  -diuretics  -female hormones, like estrogens and birth control pills  -isoniazid  -NSAIDS, medicines for pain and inflammation, like ibuprofen or naproxen  -other medicines for myasthenia gravis  -rifampin  -vaccines  This list may not describe all possible interactions. Give your health care provider a list of all the medicines, herbs, non-prescription drugs, or dietary supplements you use. Also tell them if you smoke, drink alcohol, or use illegal drugs. Some items may interact with your medicine.  What should I watch for while using this medicine?  Tell your doctor or healthcare professional if your symptoms do not start to get better or  if they get worse. Do not stop taking except on your doctor's advice. You may develop a severe reaction. Your doctor will tell you how much medicine to take.  Your condition will be monitored carefully while you are receiving this medicine.  This medicine may increase your risk of getting an infection. Tell your doctor or health care professional if you are around anyone with measles or chickenpox, or if you develop sores or blisters that do not heal properly.  This medicine may affect blood sugar levels. If you have diabetes, check with your doctor or health care professional before you change your diet or the dose of your diabetic medicine.  Tell your doctor or health care professional right away if you have any change in your eyesight.  Using this medicine for a long time may increase your risk of low bone mass. Talk to your doctor about bone health.  What side effects may I notice from receiving this medicine?  Side effects that you should report to your doctor or health care professional as soon as possible:  -allergic reactions like skin rash, itching or hives, swelling of the face, lips, or tongue  -bloody or tarry stools  -changes in vision  -hallucination, loss of contact with reality  -muscle cramps  -muscle pain  -palpitations  -signs and symptoms of high blood sugar such as dizziness; dry mouth; dry skin; fruity breath; nausea; stomach pain; increased hunger or thirst; increased urination  -signs and symptoms of infection like fever or chills; cough; sore throat; pain or trouble passing urine  -trouble passing urine or change in the amount of urine  Side effects that usually do not require medical attention (report to your doctor or health care professional if they continue or are bothersome):  -changes in emotions or mood  -constipation  -diarrhea  -excessive hair growth on the face or body  -headache  -nausea, vomiting  -pain, redness, or irritation at site where injected  -trouble sleeping  -weight  gain  This list may not describe all possible side effects. Call your doctor for medical advice about side effects. You may report side effects to FDA at 2-894-WFQ-2312.  Where should I keep my medicine?  This drug is given in a hospital or clinic and will not be stored at home.  NOTE: This sheet is a summary. It may not cover all possible information. If you have questions about this medicine, talk to your doctor, pharmacist, or health care provider.  © 2018 [employer]/Gold Standard (2017-02-23 16:21:28)

## 2018-12-30 NOTE — PROGRESS NOTES
Subjective   Inez Ladd is a 63 y.o. female.     Pneumonia   She complains of chest tightness, cough, shortness of breath and wheezing. There is no hemoptysis. This is a new problem. The current episode started 1 to 4 weeks ago. The problem occurs daily. The problem has been unchanged. The cough is productive of sputum. Pertinent negatives include no appetite change, fever, myalgias or trouble swallowing. Ineffective treatments: Amoxicllin, Oral steroids, Decadron, Rocephin. Risk factors for lung disease include smoking/tobacco exposure (Previous smoker, quit 1998).       The following portions of the patient's history were reviewed and updated as appropriate: allergies, current medications, past family history, past medical history, past social history, past surgical history and problem list.     No Known Allergies   Current Outpatient Medications on File Prior to Visit   Medication Sig   • acetaminophen (TYLENOL) 325 MG tablet Take 650 mg by mouth As Needed for Mild Pain .   • amLODIPine (NORVASC) 5 MG tablet Take 1 tablet by mouth Daily.   • fluticasone (FLONASE) 50 MCG/ACT nasal spray 2 sprays into each nostril Daily. Administer 2 sprays in each nostril for each dose.   • HYDROcodone-acetaminophen (NORCO) 7.5-325 MG per tablet Take 1-2 tablets by mouth Every 6 (Six) Hours As Needed.   • promethazine (PHENERGAN) 12.5 MG tablet Take 1 tablet by mouth Every 8 (Eight) Hours As Needed for Nausea or Vomiting.   • [DISCONTINUED] amoxicillin (AMOXIL) 500 MG capsule Take 1 capsule by mouth 2 (Two) Times a Day.   • [DISCONTINUED] levoFLOXacin (LEVAQUIN) 500 MG tablet Take 1 tablet by mouth Daily for 7 days.     No current facility-administered medications on file prior to visit.      Review of Systems   Constitutional: Positive for fatigue. Negative for appetite change, chills, diaphoresis and fever.   HENT: Positive for congestion and sinus pressure. Negative for facial swelling and trouble swallowing.    Respiratory:  Positive for cough, chest tightness, shortness of breath and wheezing. Negative for hemoptysis, choking and stridor.         Pain in left side when taking a deep breath   Cardiovascular: Negative.    Gastrointestinal: Negative for abdominal pain, diarrhea, nausea and vomiting.   Musculoskeletal: Negative for arthralgias and myalgias.   Skin: Negative for rash.       Objective   Physical Exam   Constitutional: She is oriented to person, place, and time. Vital signs are normal. She appears well-developed and well-nourished. She is cooperative. She appears ill. No distress.   HENT:   Head: Normocephalic and atraumatic.   Right Ear: External ear normal. Tympanic membrane is bulging. Tympanic membrane is not erythematous. A middle ear effusion is present.   Left Ear: External ear normal. Tympanic membrane is bulging. Tympanic membrane is not erythematous. A middle ear effusion is present.   Nose: Mucosal edema present.   Mouth/Throat: Uvula is midline and mucous membranes are normal. Posterior oropharyngeal erythema present.   Neck: Normal range of motion. Neck supple. No thyromegaly present.   Cardiovascular: Normal rate, regular rhythm and normal heart sounds.   Pulmonary/Chest: Effort normal. No stridor. No respiratory distress. She has decreased breath sounds in the left lower field. She has wheezes. She has no rhonchi. She has no rales.   Lymphadenopathy:     She has no cervical adenopathy.   Neurological: She is alert and oriented to person, place, and time.   Skin: Skin is warm, dry and intact. No rash noted. She is not diaphoretic.   Psychiatric: She has a normal mood and affect. Her behavior is normal. Judgment and thought content normal.   Vitals reviewed.    Vitals:    12/29/18 1519   BP: 120/80   Pulse: 102   Resp: 18   Temp: 97.3 °F (36.3 °C)   SpO2: 98%   Body mass index is 29.41 kg/m².     Assessment/Plan   Inez was seen today for pneumonia.    Diagnoses and all orders for this visit:    Shortness of  breath  -     POCT CBC  -     methylPREDNISolone acetate (DEPO-medrol) injection 40 mg; Inject 1 mL into the appropriate muscle as directed by prescriber 1 (One) Time.  -     CT Chest With Contrast; Future    Pneumonia of left lung due to infectious organism, unspecified part of lung  -     methylPREDNISolone acetate (DEPO-medrol) injection 40 mg; Inject 1 mL into the appropriate muscle as directed by prescriber 1 (One) Time.  -     CT Chest With Contrast; Future        *     POC WBC 7.0    Inspiratory pain  -     CT Chest With Contrast; Future    Abnormal chest x-ray  -     CT Chest With Contrast; Future    Pneumonia of left upper lobe due to infectious organism (CMS/HCC)  -     levoFLOXacin (LEVAQUIN) 500 MG tablet; Take 1 tablet by mouth Daily for 7 days.        -     Arnuity 100 mcg prescription handwritten and patient provided with a coupon to take to pharmacy for one inhaler    Discussed the nature of the medical condition(s) risks, complications, implications, management, safe and proper use of medications. Encouraged medication compliance, and keeping scheduled follow up appointments with me and any other providers.

## 2018-12-31 ENCOUNTER — HOSPITAL ENCOUNTER (OUTPATIENT)
Dept: CT IMAGING | Facility: HOSPITAL | Age: 63
Discharge: HOME | End: 2018-12-31
Admitting: NURSE PRACTITIONER

## 2018-12-31 DIAGNOSIS — J18.9 PNEUMONIA OF LEFT LUNG DUE TO INFECTIOUS ORGANISM, UNSPECIFIED PART OF LUNG: ICD-10-CM

## 2018-12-31 DIAGNOSIS — R06.02 SHORTNESS OF BREATH: ICD-10-CM

## 2018-12-31 DIAGNOSIS — R07.1 INSPIRATORY PAIN: ICD-10-CM

## 2018-12-31 DIAGNOSIS — R93.89 ABNORMAL CHEST X-RAY: ICD-10-CM

## 2018-12-31 PROCEDURE — 71260 CT THORAX DX C+: CPT

## 2018-12-31 PROCEDURE — 25010000002 IOPAMIDOL 61 % SOLUTION: Performed by: NURSE PRACTITIONER

## 2018-12-31 RX ADMIN — IOPAMIDOL 75 ML: 612 INJECTION, SOLUTION INTRAVENOUS at 13:05

## 2019-01-02 ENCOUNTER — TELEPHONE (OUTPATIENT)
Dept: FAMILY MEDICINE CLINIC | Facility: CLINIC | Age: 64
End: 2019-01-02

## 2019-01-02 NOTE — TELEPHONE ENCOUNTER
Contacted pt with results. She states she has 2 more days left of Levaquin and when she breathes in deeply there is still a little pain. I told her if she still has this pain after she has completed her med to call us.    ----- Message from STEVE Payne sent at 1/1/2019 10:20 AM EST -----  Please let her know that her CT of the chest was normal. No pneumonia or pulmonary nodules. The abnormality noted on the chest Xray has improved.

## 2019-01-28 RX ORDER — FLUTICASONE FUROATE 100 UG/1
POWDER RESPIRATORY (INHALATION)
Qty: 30 EACH | Refills: 0 | Status: SHIPPED | OUTPATIENT
Start: 2019-01-28 | End: 2019-03-05 | Stop reason: SDUPTHER

## 2019-02-06 ENCOUNTER — OFFICE VISIT (OUTPATIENT)
Dept: FAMILY MEDICINE CLINIC | Facility: CLINIC | Age: 64
End: 2019-02-06

## 2019-02-06 VITALS
HEART RATE: 105 BPM | OXYGEN SATURATION: 95 % | TEMPERATURE: 97.5 F | DIASTOLIC BLOOD PRESSURE: 78 MMHG | BODY MASS INDEX: 28.88 KG/M2 | RESPIRATION RATE: 22 BRPM | HEIGHT: 69 IN | SYSTOLIC BLOOD PRESSURE: 122 MMHG | WEIGHT: 195 LBS

## 2019-02-06 DIAGNOSIS — J45.21 MILD INTERMITTENT ASTHMATIC BRONCHITIS WITH ACUTE EXACERBATION: Primary | ICD-10-CM

## 2019-02-06 DIAGNOSIS — J04.0 LARYNGITIS: ICD-10-CM

## 2019-02-06 PROCEDURE — 99213 OFFICE O/P EST LOW 20 MIN: CPT | Performed by: FAMILY MEDICINE

## 2019-02-06 RX ORDER — CLARITHROMYCIN 500 MG/1
500 TABLET, COATED ORAL 2 TIMES DAILY
Qty: 20 TABLET | Refills: 0 | Status: SHIPPED | OUTPATIENT
Start: 2019-02-06 | End: 2019-04-22

## 2019-02-06 NOTE — PROGRESS NOTES
"Subjective   Inez Ladd is a 63 y.o. female seen today for possible pnuemonia (started 02/05/19/ talk about inhaler).     Cough   This is a new problem. The current episode started yesterday. The cough is non-productive. Associated symptoms include chills, myalgias (mild sharp pain left side with deep breaths) and postnasal drip. Pertinent negatives include no chest pain, fever, sore throat, shortness of breath or wheezing. The symptoms are aggravated by lying down. She has tried OTC cough suppressant and steroid inhaler (Levaquin) for the symptoms.        The following portions of the patient's history were reviewed and updated as appropriate: allergies, current medications, past social history and problem list.    Review of Systems   Constitutional: Positive for chills. Negative for fever.   HENT: Positive for postnasal drip and voice change (hoarseness). Negative for sore throat.    Respiratory: Positive for cough. Negative for shortness of breath and wheezing.    Cardiovascular: Negative for chest pain.   Musculoskeletal: Positive for myalgias (mild sharp pain left side with deep breaths).       Objective   /78   Pulse 105   Temp 97.5 °F (36.4 °C) (Temporal)   Resp 22   Ht 175.3 cm (69\")   Wt 88.5 kg (195 lb)   SpO2 95%   BMI 28.80 kg/m²   Physical Exam   Constitutional: She appears well-developed and well-nourished.   HENT:   Right Ear: External ear normal.   Left Ear: External ear normal.   Mouth/Throat: Oropharynx is clear and moist.   Cardiovascular: Normal rate and regular rhythm.   Pulmonary/Chest: Effort normal and breath sounds normal.   Nursing note and vitals reviewed.    Only Spirometry  Date/Time: 2/9/2019 12:25 PM  Performed by: Jose Culp MD  Authorized by: Jose Culp MD       The patient's forced vital capacity was 2.93/L which is 76% of predicted and the FEV1 was 2.65 L which is 90% of predicted.    Assessment/Plan   Problem List Items Addressed This Visit     None    "   Visit Diagnoses     Mild intermittent asthmatic bronchitis with acute exacerbation    -  Primary    Relevant Medications    clarithromycin (BIAXIN) 500 MG tablet    Other Relevant Orders    Only Spirometry    Laryngitis        Relevant Medications    clarithromycin (BIAXIN) 500 MG tablet              Drink plenty fluids.  Stay off work today through Friday.    Continue Arnuity 1 puff daily for another 2 months.    Rx for Clarithromycin 500 mg twice a day #20+0.    Follow up as needed.                  Scribed for Dr Jose Culp by Sujata Kwan CMA.          I, Jose Culp MD, personally performed the services described in this documentation, as scribed by Sujtaa Kwan in my presence, and is both accurate and complete.        (Please note that portions of this note were completed with a voice recognition program. Efforts were made to edit the dictations,but occasionally words are mis transcribed.)

## 2019-02-09 PROCEDURE — 94010 BREATHING CAPACITY TEST: CPT | Performed by: FAMILY MEDICINE

## 2019-03-06 RX ORDER — FLUTICASONE FUROATE 100 UG/1
POWDER RESPIRATORY (INHALATION)
Qty: 30 EACH | Refills: 0 | Status: SHIPPED | OUTPATIENT
Start: 2019-03-06 | End: 2019-04-22 | Stop reason: SDUPTHER

## 2019-04-22 ENCOUNTER — OFFICE VISIT (OUTPATIENT)
Dept: FAMILY MEDICINE CLINIC | Facility: CLINIC | Age: 64
End: 2019-04-22

## 2019-04-22 VITALS
OXYGEN SATURATION: 95 % | TEMPERATURE: 99 F | HEIGHT: 69 IN | WEIGHT: 194 LBS | SYSTOLIC BLOOD PRESSURE: 132 MMHG | HEART RATE: 100 BPM | RESPIRATION RATE: 16 BRPM | DIASTOLIC BLOOD PRESSURE: 66 MMHG | BODY MASS INDEX: 28.73 KG/M2

## 2019-04-22 DIAGNOSIS — J04.0 LARYNGITIS: ICD-10-CM

## 2019-04-22 DIAGNOSIS — J01.00 ACUTE MAXILLARY SINUSITIS, RECURRENCE NOT SPECIFIED: Primary | ICD-10-CM

## 2019-04-22 DIAGNOSIS — Z76.0 MEDICATION REFILL: ICD-10-CM

## 2019-04-22 PROCEDURE — 99213 OFFICE O/P EST LOW 20 MIN: CPT | Performed by: NURSE PRACTITIONER

## 2019-04-22 RX ORDER — AMOXICILLIN AND CLAVULANATE POTASSIUM 875; 125 MG/1; MG/1
1 TABLET, FILM COATED ORAL 2 TIMES DAILY
Qty: 14 TABLET | Refills: 0 | Status: SHIPPED | OUTPATIENT
Start: 2019-04-22 | End: 2019-08-28

## 2019-04-22 RX ORDER — PREDNISONE 10 MG/1
10 TABLET ORAL SEE ADMIN INSTRUCTIONS
Qty: 21 TABLET | Refills: 0 | Status: SHIPPED | OUTPATIENT
Start: 2019-04-22 | End: 2019-08-28

## 2019-04-22 NOTE — PATIENT INSTRUCTIONS
Sinusitis, Adult  Sinusitis is soreness and swelling (inflammation) of your sinuses. Sinuses are hollow spaces in the bones around your face. They are located:  · Around your eyes.  · In the middle of your forehead.  · Behind your nose.  · In your cheekbones.    Your sinuses and nasal passages are lined with a stringy fluid (mucus). Mucus normally drains out of your sinuses. Swelling can trap mucus in your sinuses. This lets germs like bacteria or viruses grow, and that leads to infection. Most of the time, sinusitis is caused by a virus.  If bacteria is causing your infection, your doctor may have you wait and see if you get better without antibiotic medicine. You may be prescribed antibiotics if you have:  · A very bad infection.  · A weak disease-fighting (immune) system.    Follow these instructions at home:  Medicines  · Take, use, or apply over-the-counter and prescription medicines only as told by your doctor. These may include nasal sprays.  · If you were prescribed an antibiotic, take it as told by your doctor. Do not stop taking the antibiotic even if you start to feel better.  Hydrate and Humidify  · Drink enough water to keep your pee (urine) pale yellow.  · Use a cool mist humidifier to keep the humidity level in your home above 50%.  · Breathe in steam for 10-15 minutes, 3-4 times a day or as told by your doctor. You can do this in the bathroom while a hot shower is running.  · Try not to spend time in cool or dry air.  Rest  · Rest as much as possible.  · Sleep with your head raised (elevated).  · Make sure to get enough sleep each night.  General instructions  · Put a warm, moist washcloth on your face 3-4 times a day, or as often as told by your doctor. This will help with discomfort.  · Wash your hands often with soap and water. If there is no soap and water, use hand .  · Do not smoke. Avoid being around people who are smoking (secondhand smoke).  · Keep all follow-up visits as told by  your doctor. This is important.  Contact a doctor if:  · You have a fever.  · Your symptoms get worse.  · Your symptoms do not get better within 10 days.  Get help right away if:  · You have a very bad headache.  · You cannot stop throwing up (vomiting).  · You have pain or swelling around your face or eyes.  · You have trouble seeing.  · You feel confused.  · Your neck is stiff.  · You have trouble breathing.  This information is not intended to replace advice given to you by your health care provider. Make sure you discuss any questions you have with your health care provider.  Document Released: 06/05/2009 Document Revised: 06/29/2018 Document Reviewed: 10/12/2016  [employer] Interactive Patient Education © 2019 [employer]  Laryngitis  Laryngitis is swelling (inflammation) of your vocal cords. This causes hoarseness, coughing, loss of voice, sore throat, or a dry throat. When your vocal cords are inflamed, your voice sounds different.  Laryngitis can be temporary (acute) or long-term (chronic). Most cases of acute laryngitis improve with time. Chronic laryngitis is laryngitis that lasts for more than three weeks.  Follow these instructions at home:  · Drink enough fluid to keep your pee (urine) clear or pale yellow.  · Breathe in moist air. Use a humidifier if you live in a dry climate.  · Take medicines only as told by your doctor.  · Do not smoke cigarettes or electronic cigarettes. If you need help quitting, ask your doctor.  · Talk as little as possible. Also avoid whispering, which can cause vocal strain.  · Write instead of talking. Do this until your voice is back to normal.  Contact a doctor if:  · You have a fever.  · Your pain is worse.  · You have trouble swallowing.  Get help right away if:  · You cough up blood.  · You have trouble breathing.  This information is not intended to replace advice given to you by your health care provider. Make sure you discuss any questions you have with your health  care provider.  Document Released: 12/06/2012 Document Revised: 05/25/2017 Document Reviewed: 06/02/2015  [employer] Interactive Patient Education © 2018 [employer]

## 2019-04-22 NOTE — PROGRESS NOTES
Subjective   Inez Ladd is a 64 y.o. female.   Chief Complaint   Patient presents with   • Laryngitis     loss of voice with drainage, pt states that this is how it started the last time she was sick.     same day acute visit.   History of Present Illness   Patient is here for a raspy voice. It started several days ago. Having some greenish yellow nasal drainage. She has taken a motrin a couple days ago. She has been taking Flonase daily.  Hx of asthma and seasonal allergies.   Denies:  fever, chills, nausea, vomiting or diarrhea. Works as a  in the public - is struggling with her job due to her loss of voice. Quit smoking in 1997           The following portions of the patient's history were reviewed and updated as appropriate: allergies, current medications, past family history, past medical history, past social history, past surgical history and problem list.    Review of Systems   Constitutional: Positive for fatigue. Negative for activity change, appetite change, chills and fever.   HENT: Positive for congestion, postnasal drip, rhinorrhea and voice change. Negative for sinus pressure, sinus pain, sneezing and sore throat.         Thick yellow nasal drainage    Eyes: Negative for discharge and itching.   Respiratory: Negative for cough, chest tightness and wheezing.    Gastrointestinal: Negative for abdominal distention, diarrhea, nausea and vomiting.   Musculoskeletal: Negative.    Skin: Negative.    Allergic/Immunologic: Positive for environmental allergies.        Seasonal allergies    Neurological: Positive for headaches.   Hematological: Negative.    Psychiatric/Behavioral: Negative.      Past Surgical History:   Procedure Laterality Date   • CARPAL TUNNEL RELEASE     • KNEE SURGERY     • NO PAST SURGERIES       Past Medical History:   Diagnosis Date   • Acute cystitis    • Acute pharyngitis    • Body aches    • Cancer (CMS/HCC)     skin    • Cellulitis    • Dysuria    • H/O bladder infections      "2 SINCE AUGUST, urinary tract infection   • History of prolapse of bladder      Bladder disorder   • Hypertension    • Impacted cerumen     Impacted cerumen, unspecified ear    • Infected insect bite of forearm    • Menopause    • Prophylactic immunotherapy        Objective   No Known Allergies  Visit Vitals  /66   Pulse 100   Temp 99 °F (37.2 °C) (Temporal)   Resp 16   Ht 175.3 cm (69\")   Wt 88 kg (194 lb)   LMP  (LMP Unknown)   SpO2 95%   BMI 28.65 kg/m²       Physical Exam   Constitutional: She appears well-developed and well-nourished. She is cooperative. She appears ill.   HENT:   Head: Normocephalic.   Right Ear: Tympanic membrane is not erythematous and not bulging. Decreased hearing is noted.   Left Ear: Tympanic membrane is not erythematous and not bulging. Decreased hearing is noted.   Nose: Mucosal edema present. Right sinus exhibits maxillary sinus tenderness. Right sinus exhibits no frontal sinus tenderness. Left sinus exhibits maxillary sinus tenderness. Left sinus exhibits no frontal sinus tenderness.   Mouth/Throat: Uvula is midline and oropharynx is clear and moist. No posterior oropharyngeal erythema. Tonsils are 1+ on the right. Tonsils are 1+ on the left. No tonsillar exudate.   Wears bilateral hearing aids.   Whispering voice     Neurological: She is alert.   Skin: Skin is warm, dry and intact. Capillary refill takes less than 2 seconds.   Vitals reviewed.      Assessment/Plan   Inez was seen today for laryngitis.    Diagnoses and all orders for this visit:    Acute maxillary sinusitis, recurrence not specified  -     amoxicillin-clavulanate (AUGMENTIN) 875-125 MG per tablet; Take 1 tablet by mouth 2 (Two) Times a Day.    Medication refill  -     Fluticasone Furoate (ARNUITY ELLIPTA) 100 MCG/ACT aerosol powder ; Inhale 1 puff Daily.    Laryngitis  -     predniSONE (DELTASONE) 10 MG tablet; Take 1 tablet by mouth See Admin Instructions.      Follow up with Dr. Culp as needed   Continue to " use the flonase as ordered   Take an over the counter allergy medication if needed for seasonal allergies   See patient instructions for sinusitis and laryngitis.  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              Patient Instructions   Sinusitis, Adult  Sinusitis is soreness and swelling (inflammation) of your sinuses. Sinuses are hollow spaces in the bones around your face. They are located:  · Around your eyes.  · In the middle of your forehead.  · Behind your nose.  · In your cheekbones.    Your sinuses and nasal passages are lined with a stringy fluid (mucus). Mucus normally drains out of your sinuses. Swelling can trap mucus in your sinuses. This lets germs like bacteria or viruses grow, and that leads to infection. Most of the time, sinusitis is caused by a virus.  If bacteria is causing your infection, your doctor may have you wait and see if you get better without antibiotic medicine. You may be prescribed antibiotics if you have:  · A very bad infection.  · A weak disease-fighting (immune) system.    Follow these instructions at home:  Medicines  · Take, use, or apply over-the-counter and prescription medicines only as told by your doctor. These may include nasal sprays.  · If you were prescribed an antibiotic, take it as told by your doctor. Do not stop taking the antibiotic even if you start to feel better.  Hydrate and Humidify  · Drink enough water to keep your pee (urine) pale yellow.  · Use a cool mist humidifier to keep the humidity level in your home above 50%.  · Breathe in steam for 10-15 minutes, 3-4 times a day or as told by your doctor. You can do this in the bathroom while a hot shower is running.  · Try not to spend time in cool or dry air.  Rest  · Rest as much as possible.  · Sleep with your head raised (elevated).  · Make sure to get enough sleep each night.  General instructions  · Put a warm, moist washcloth on your face 3-4 times a day, or as often as told by your doctor. This  will help with discomfort.  · Wash your hands often with soap and water. If there is no soap and water, use hand .  · Do not smoke. Avoid being around people who are smoking (secondhand smoke).  · Keep all follow-up visits as told by your doctor. This is important.  Contact a doctor if:  · You have a fever.  · Your symptoms get worse.  · Your symptoms do not get better within 10 days.  Get help right away if:  · You have a very bad headache.  · You cannot stop throwing up (vomiting).  · You have pain or swelling around your face or eyes.  · You have trouble seeing.  · You feel confused.  · Your neck is stiff.  · You have trouble breathing.  This information is not intended to replace advice given to you by your health care provider. Make sure you discuss any questions you have with your health care provider.  Document Released: 06/05/2009 Document Revised: 06/29/2018 Document Reviewed: 10/12/2016  [employer] Interactive Patient Education © 2019 [employer]  Laryngitis  Laryngitis is swelling (inflammation) of your vocal cords. This causes hoarseness, coughing, loss of voice, sore throat, or a dry throat. When your vocal cords are inflamed, your voice sounds different.  Laryngitis can be temporary (acute) or long-term (chronic). Most cases of acute laryngitis improve with time. Chronic laryngitis is laryngitis that lasts for more than three weeks.  Follow these instructions at home:  · Drink enough fluid to keep your pee (urine) clear or pale yellow.  · Breathe in moist air. Use a humidifier if you live in a dry climate.  · Take medicines only as told by your doctor.  · Do not smoke cigarettes or electronic cigarettes. If you need help quitting, ask your doctor.  · Talk as little as possible. Also avoid whispering, which can cause vocal strain.  · Write instead of talking. Do this until your voice is back to normal.  Contact a doctor if:  · You have a fever.  · Your pain is worse.  · You have trouble  swallowing.  Get help right away if:  · You cough up blood.  · You have trouble breathing.  This information is not intended to replace advice given to you by your health care provider. Make sure you discuss any questions you have with your health care provider.  Document Released: 12/06/2012 Document Revised: 05/25/2017 Document Reviewed: 06/02/2015  [employer] Interactive Patient Education © 2018 [employer]        Dedra Mcdermott, APRN

## 2019-08-28 ENCOUNTER — OFFICE VISIT (OUTPATIENT)
Dept: FAMILY MEDICINE CLINIC | Facility: CLINIC | Age: 64
End: 2019-08-28

## 2019-08-28 VITALS
DIASTOLIC BLOOD PRESSURE: 70 MMHG | OXYGEN SATURATION: 97 % | HEART RATE: 81 BPM | HEIGHT: 69 IN | SYSTOLIC BLOOD PRESSURE: 130 MMHG | BODY MASS INDEX: 28.29 KG/M2 | WEIGHT: 191 LBS | TEMPERATURE: 97.6 F | RESPIRATION RATE: 15 BRPM

## 2019-08-28 DIAGNOSIS — K52.9 POSTPRANDIAL DIARRHEA: ICD-10-CM

## 2019-08-28 DIAGNOSIS — R10.84 GENERALIZED ABDOMINAL PAIN: Primary | ICD-10-CM

## 2019-08-28 PROCEDURE — 99213 OFFICE O/P EST LOW 20 MIN: CPT | Performed by: FAMILY MEDICINE

## 2019-08-28 RX ORDER — HYOSCYAMINE SULFATE 0.125 MG
0.12 TABLET ORAL
Qty: 20 TABLET | Refills: 2 | Status: SHIPPED | OUTPATIENT
Start: 2019-08-28 | End: 2019-12-06

## 2019-08-31 ENCOUNTER — OFFICE VISIT (OUTPATIENT)
Dept: FAMILY MEDICINE CLINIC | Facility: CLINIC | Age: 64
End: 2019-08-31

## 2019-08-31 VITALS
DIASTOLIC BLOOD PRESSURE: 68 MMHG | WEIGHT: 190 LBS | RESPIRATION RATE: 19 BRPM | BODY MASS INDEX: 28.14 KG/M2 | HEIGHT: 69 IN | TEMPERATURE: 97.9 F | HEART RATE: 82 BPM | SYSTOLIC BLOOD PRESSURE: 144 MMHG | OXYGEN SATURATION: 98 %

## 2019-08-31 DIAGNOSIS — J30.9 ALLERGIC RHINITIS, UNSPECIFIED SEASONALITY, UNSPECIFIED TRIGGER: ICD-10-CM

## 2019-08-31 DIAGNOSIS — J06.9 ACUTE URI: Primary | ICD-10-CM

## 2019-08-31 PROCEDURE — 99213 OFFICE O/P EST LOW 20 MIN: CPT | Performed by: NURSE PRACTITIONER

## 2019-08-31 NOTE — PATIENT INSTRUCTIONS
Flonase and Zyrtec OTC    Allergic Rhinitis, Adult  Allergic rhinitis is an allergic reaction that affects the mucous membrane inside the nose. It causes sneezing, a runny or stuffy nose, and the feeling of mucus going down the back of the throat (postnasal drip). Allergic rhinitis can be mild to severe.  There are two types of allergic rhinitis:  · Seasonal. This type is also called hay fever. It happens only during certain seasons.  · Perennial. This type can happen at any time of the year.  What are the causes?  This condition happens when the body's defense system (immune system) responds to certain harmless substances called allergens as though they were germs.   Seasonal allergic rhinitis is triggered by pollen, which can come from grasses, trees, and weeds. Perennial allergic rhinitis may be caused by:  · House dust mites.  · Pet dander.  · Mold spores.  What are the signs or symptoms?  Symptoms of this condition include:  · Sneezing.  · Runny or stuffy nose (nasal congestion).  · Postnasal drip.  · Itchy nose.  · Tearing of the eyes.  · Trouble sleeping.  · Daytime sleepiness.  How is this diagnosed?  This condition may be diagnosed based on:  · Your medical history.  · A physical exam.  · Tests to check for related conditions, such as:  ? Asthma.  ? Pink eye.  ? Ear infection.  ? Upper respiratory infection.  · Tests to find out which allergens trigger your symptoms. These may include skin or blood tests.  How is this treated?  There is no cure for this condition, but treatment can help control symptoms. Treatment may include:  · Taking medicines that block allergy symptoms, such as antihistamines. Medicine may be given as a shot, nasal spray, or pill.  · Avoiding the allergen.  · Desensitization. This treatment involves getting ongoing shots until your body becomes less sensitive to the allergen. This treatment may be done if other treatments do not help.  · If taking medicine and avoiding the allergen  does not work, new, stronger medicines may be prescribed.  Follow these instructions at home:  · Find out what you are allergic to. Common allergens include smoke, dust, and pollen.  · Avoid the things you are allergic to. These are some things you can do to help avoid allergens:  ? Replace carpet with wood, tile, or vinyl isma. Carpet can trap dander and dust.  ? Do not smoke. Do not allow smoking in your home.  ? Change your heating and air conditioning filter at least once a month.  ? During allergy season:  § Keep windows closed as much as possible.  § Plan outdoor activities when pollen counts are lowest. This is usually during the evening hours.  § When coming indoors, change clothing and shower before sitting on furniture or bedding.  · Take over-the-counter and prescription medicines only as told by your health care provider.  · Keep all follow-up visits as told by your health care provider. This is important.  Contact a health care provider if:  · You have a fever.  · You develop a persistent cough.  · You make whistling sounds when you breathe (you wheeze).  · Your symptoms interfere with your normal daily activities.  Get help right away if:  · You have shortness of breath.  Summary  · This condition can be managed by taking medicines as directed and avoiding allergens.  · Contact your health care provider if you develop a persistent cough or fever.  · During allergy season, keep windows closed as much as possible.  This information is not intended to replace advice given to you by your health care provider. Make sure you discuss any questions you have with your health care provider.  Document Released: 09/12/2002 Document Revised: 01/25/2018 Document Reviewed: 01/25/2018  [employer] Interactive Patient Education © 2019 [employer]      Viral Respiratory Infection  A respiratory infection is an illness that affects part of the respiratory system, such as the lungs, nose, or throat. A respiratory  infection that is caused by a virus is called a viral respiratory infection.  Common types of viral respiratory infections include:  A cold.  The flu (influenza).  A respiratory syncytial virus (RSV) infection.  What are the causes?  This condition is caused by a virus.  What are the signs or symptoms?  Symptoms of this condition include:  A stuffy or runny nose.  Yellow or green nasal discharge.  A cough.  Sneezing.  Fatigue.  Achy muscles.  A sore throat.  Sweating or chills.  A fever.  A headache.  How is this diagnosed?  This condition may be diagnosed based on:  Your symptoms.  A physical exam.  Testing of nasal swabs.  How is this treated?  This condition may be treated with medicines, such as:  Antiviral medicine. This may shorten the length of time a person has symptoms.  Expectorants. These make it easier to cough up mucus.  Decongestant nasal sprays.  Acetaminophen or NSAIDs to relieve fever and pain.  Antibiotic medicines are not prescribed for viral infections. This is because antibiotics are designed to kill bacteria. They are not effective against viruses.  Follow these instructions at home:    Managing pain and congestion  Take over-the-counter and prescription medicines only as told by your health care provider.  If you have a sore throat, gargle with a salt-water mixture 3-4 times a day or as needed. To make a salt-water mixture, completely dissolve ½-1 tsp of salt in 1 cup of warm water.  Use nose drops made from salt water to ease congestion and soften raw skin around your nose.  Drink enough fluid to keep your urine pale yellow. This helps prevent dehydration and helps loosen up mucus.  General instructions  Rest as much as possible.  Do not drink alcohol.  Do not use any products that contain nicotine or tobacco, such as cigarettes and e-cigarettes. If you need help quitting, ask your health care provider.  Keep all follow-up visits as told by your health care provider. This is important.  How  is this prevented?    Get an annual flu shot. You may get the flu shot in late summer, fall, or winter. Ask your health care provider when you should get your flu shot.  Avoid exposing others to your respiratory infection.  Stay home from work or school as told by your health care provider.  Wash your hands with soap and water often, especially after you cough or sneeze. If soap and water are not available, use alcohol-based hand .  Avoid contact with people who are sick during cold and flu season. This is generally fall and winter.  Contact a health care provider if:  Your symptoms last for 10 days or longer.  Your symptoms get worse over time.  You have a fever.  You have severe sinus pain in your face or forehead.  The glands in your jaw or neck become very swollen.  Get help right away if you:  Feel pain or pressure in your chest.  Have shortness of breath.  Faint or feel like you will faint.  Have severe and persistent vomiting.  Feel confused or disoriented.  Summary  A respiratory infection is an illness that affects part of the respiratory system, such as the lungs, nose, or throat. A respiratory infection that is caused by a virus is called a viral respiratory infection.  Common types of viral respiratory infections are a cold, influenza, and respiratory syncytial virus (RSV) infection.  Symptoms of this condition include a stuffy or runny nose, cough, sneezing, fatigue, achy muscles, sore throat, and fevers or chills.  Antibiotic medicines are not prescribed for viral infections. This is because antibiotics are designed to kill bacteria. They are not effective against viruses.  This information is not intended to replace advice given to you by your health care provider. Make sure you discuss any questions you have with your health care provider.  Document Released: 09/27/2006 Document Revised: 01/28/2019 Document Reviewed: 01/28/2019  [employer] Interactive Patient Education © 2019 [employer]

## 2019-08-31 NOTE — PROGRESS NOTES
"CHIEF COMPLAINT:  Sinus Problem (Runny nose, chills, throat irratation, drainage, sinus congestion, and sneezing.)    Sinus Problem   This is a new problem. The current episode started yesterday. The problem has been gradually worsening since onset. There has been no fever. Her pain is at a severity of 4/10. Associated symptoms include chills, congestion, headaches (chronic), sinus pressure and sneezing. Pertinent negatives include no coughing, diaphoresis, ear pain, shortness of breath or sore throat. Past treatments include acetaminophen. The treatment provided mild relief.   Chronic sinus headaches  Uses flonase occasionally, uses tyelonol occasionally  Had PNA last December  Uses the arnuity ellipta     Review of Systems   Constitutional: Positive for chills. Negative for diaphoresis, fatigue and fever.   HENT: Positive for congestion, sinus pressure and sneezing. Negative for ear pain, postnasal drip, rhinorrhea and sore throat.    Eyes: Negative for discharge and itching.   Respiratory: Negative for cough, shortness of breath and wheezing.    Cardiovascular: Negative for chest pain and palpitations.   Gastrointestinal: Negative for constipation, diarrhea, nausea and vomiting.   Musculoskeletal: Negative for myalgias and neck stiffness.   Skin: Negative for rash.   Allergic/Immunologic: Negative for environmental allergies.   Neurological: Negative for dizziness and headache.      The following portions of the patient's history were reviewed and updated as appropriate: allergies, current medications, past family history, past medical history, past social history, past surgical history and problem list.    Visit Vitals  /68   Pulse 82   Temp 97.9 °F (36.6 °C)   Resp 19   Ht 175.3 cm (69\")   Wt 86.2 kg (190 lb)   LMP  (LMP Unknown)   SpO2 98%   BMI 28.06 kg/m²        Physical Exam   Constitutional: She is oriented to person, place, and time. She appears well-developed and well-nourished. She is cooperative. "  Non-toxic appearance. She does not have a sickly appearance. She does not appear ill. No distress. She appears overweight.   HENT:   Head: Normocephalic.   Right Ear: Hearing, tympanic membrane, external ear and ear canal normal. Tympanic membrane is not injected and not erythematous. No middle ear effusion. cerumen impaction is not present.  Left Ear: Hearing, tympanic membrane, external ear and ear canal normal. Tympanic membrane is not injected and not erythematous.  No middle ear effusion. An impacted cerumen is not present.  Nose: Mucosal edema and rhinorrhea present. No congestion. Right sinus exhibits no maxillary sinus tenderness and no frontal sinus tenderness. Left sinus exhibits no maxillary sinus tenderness and no frontal sinus tenderness.   Mouth/Throat: Mucous membranes are normal. Oropharyngeal exudate (clear) present. No posterior oropharyngeal edema or posterior oropharyngeal erythema. No tonsillar exudate.   Eyes: Conjunctivae are normal. Pupils are equal, round, and reactive to light.   Neck: Neck supple.   Cardiovascular: Normal rate, regular rhythm and normal heart sounds.   Pulmonary/Chest: Effort normal and breath sounds normal. No respiratory distress. She has no decreased breath sounds. She has no wheezes. She has no rhonchi. She has no rales.   Lymphadenopathy:        Head (right side): No submental, no submandibular, no tonsillar, no preauricular and no posterior auricular adenopathy present.        Head (left side): No submental, no submandibular, no tonsillar, no preauricular and no posterior auricular adenopathy present.     She has no cervical adenopathy.        Right: No supraclavicular adenopathy present.        Left: No supraclavicular adenopathy present.   Neurological: She is alert and oriented to person, place, and time.   Skin: Skin is warm, dry and intact. No rash noted. She is not diaphoretic.   Psychiatric: She has a normal mood and affect. Her speech is normal and behavior  is normal. Judgment and thought content normal.   Vitals reviewed.    ASSESSMENT/PLAN  Diagnoses and all orders for this visit:    1. Acute URI (Primary)  New osnet.  Likely viral in etiology given symptoms and duration.  Will treat symptomatically at this time.  For rhinorrhea or nasal congestion, pt may use nasal decongestant for a maximum of 3 days. Acetaminophen may be used for headache relief. Increase your water intake.  Follow up if symptoms persist longer than 7-10 days from onset or if symptoms change.    2. Allergic rhinitis, unspecified seasonality, unspecified trigger  Uncontrolled.  Start using an OTC antihistamine (Claritin, Astelin, Allegra, Zyrtec, Singulair, Zyzal) and nasal steroid (Flonase, Nasacort, Nasonex) as directed.  Avoid using any nasal decongestant for longer than 3 days.  Follow up if symptoms are uncontrolled or worsen.     3. BMI 28.0-28.9,adult  Maintain healthy diet. Instructed patient to increase his water intake before each meal.     Return if symptoms worsen or fail to improve.    Patient instructed to follow up with our office for results on any labs/imaging ordered during this visit.  Patient verbalizes understanding and agrees to treatment plan.

## 2019-09-03 ENCOUNTER — TELEPHONE (OUTPATIENT)
Dept: FAMILY MEDICINE CLINIC | Facility: CLINIC | Age: 64
End: 2019-09-03

## 2019-09-03 RX ORDER — AZITHROMYCIN 250 MG/1
TABLET, FILM COATED ORAL
Qty: 6 TABLET | Refills: 0 | Status: SHIPPED | OUTPATIENT
Start: 2019-09-03 | End: 2019-12-06

## 2019-09-03 NOTE — TELEPHONE ENCOUNTER
----- Message from Karla Gutierrez sent at 9/3/2019  9:43 AM EDT -----  Regarding: MED REQUEST  Contact: [phone]  Patient was seen Saturday by Mya and was told if she wasn't feeling better to call back and someone might be able to call her in a prescription.  She said she is still not feeling well, still has runny nose and is losing her voice and doesn't want to get pneumonia like she did last year.  So she would like Dr Culp to send her in a prescription for an antibiotic.  Please call and advise @ [phone].  Thank you.     Rx sent in for a zpak, pt notified.  RITA Calix

## 2019-09-17 ENCOUNTER — HOSPITAL ENCOUNTER (OUTPATIENT)
Dept: ULTRASOUND IMAGING | Facility: HOSPITAL | Age: 64
Discharge: HOME | End: 2019-09-17
Admitting: FAMILY MEDICINE

## 2019-09-17 DIAGNOSIS — K52.9 POSTPRANDIAL DIARRHEA: ICD-10-CM

## 2019-09-17 DIAGNOSIS — R10.84 GENERALIZED ABDOMINAL PAIN: ICD-10-CM

## 2019-09-17 PROCEDURE — 76700 US EXAM ABDOM COMPLETE: CPT

## 2019-10-09 ENCOUNTER — TELEPHONE (OUTPATIENT)
Dept: FAMILY MEDICINE CLINIC | Facility: CLINIC | Age: 64
End: 2019-10-09

## 2019-10-09 NOTE — TELEPHONE ENCOUNTER
----- Message from Karla Gutierrez sent at 10/9/2019  8:08 AM EDT -----  Regarding: JURY DUTY EXCUSE  Contact: [phone]  Patient called and said she received a jury duty summons and would like to know if Dr Culp could write her a letter to excuse her from jury duty since she has some stomach issues.  Please call and advise @ [phone].  Thank you.     Note was written for pt to be excused from jury duty and left up front for pt to .  BBedgar, CMA

## 2019-10-22 ENCOUNTER — OFFICE VISIT (OUTPATIENT)
Dept: FAMILY MEDICINE CLINIC | Facility: CLINIC | Age: 64
End: 2019-10-22

## 2019-10-22 VITALS
TEMPERATURE: 96.7 F | RESPIRATION RATE: 19 BRPM | WEIGHT: 191.2 LBS | HEIGHT: 69 IN | DIASTOLIC BLOOD PRESSURE: 70 MMHG | SYSTOLIC BLOOD PRESSURE: 138 MMHG | BODY MASS INDEX: 28.32 KG/M2 | HEART RATE: 90 BPM | OXYGEN SATURATION: 98 %

## 2019-10-22 DIAGNOSIS — J06.9 ACUTE URI: Primary | ICD-10-CM

## 2019-10-22 DIAGNOSIS — J01.00 ACUTE NON-RECURRENT MAXILLARY SINUSITIS: ICD-10-CM

## 2019-10-22 PROCEDURE — 99213 OFFICE O/P EST LOW 20 MIN: CPT | Performed by: NURSE PRACTITIONER

## 2019-10-22 RX ORDER — DEXTROMETHORPHAN HYDROBROMIDE AND PROMETHAZINE HYDROCHLORIDE 15; 6.25 MG/5ML; MG/5ML
5 SYRUP ORAL 4 TIMES DAILY PRN
Qty: 180 ML | Refills: 0 | Status: SHIPPED | OUTPATIENT
Start: 2019-10-22 | End: 2020-09-04

## 2019-10-22 RX ORDER — DOXYCYCLINE HYCLATE 100 MG
100 TABLET ORAL 2 TIMES DAILY
Qty: 20 TABLET | Refills: 0 | Status: SHIPPED | OUTPATIENT
Start: 2019-10-22 | End: 2019-11-01

## 2019-10-22 NOTE — PROGRESS NOTES
Inez Ladd is a 64 y.o. female who presents for laryngitis and cough.    Chief Complaint   Patient presents with   • Cough   • Sore Throat       Patient states that she went to [place] over the weekend and she came home sick. She has been sick for the past couple of weeks and she feels much worse today. She has laryngitis, sinus pressure, and cough. She denies fever, sore throat, or ear pain. She has been taking Tylenol with no relief.           Past Medical History:   Diagnosis Date   • Acute cystitis    • Acute pharyngitis    • Body aches    • Cancer (CMS/HCC)     skin    • Cellulitis    • Dysuria    • H/O bladder infections     2 SINCE AUGUST, urinary tract infection   • History of prolapse of bladder      Bladder disorder   • Hypertension    • Impacted cerumen     Impacted cerumen, unspecified ear    • Infected insect bite of forearm    • Menopause    • Prophylactic immunotherapy        Past Surgical History:   Procedure Laterality Date   • CARPAL TUNNEL RELEASE     • KNEE SURGERY     • NO PAST SURGERIES         Family History   Problem Relation Age of Onset   • Coronary artery disease Other    • Coronary artery disease Mother    • Hypertension Mother    • Dementia Mother    • No Known Problems Father        Social History     Socioeconomic History   • Marital status:      Spouse name: Not on file   • Number of children: Not on file   • Years of education: Not on file   • Highest education level: Not on file   Occupational History   • Occupation: Nurse     Comment: [employer]   Tobacco Use   • Smoking status: Former Smoker     Types: Cigarettes     Last attempt to quit: 1998     Years since quittin.8   • Smokeless tobacco: Never Used   Substance and Sexual Activity   • Alcohol use: Yes     Comment: Occasional alcohol use    • Drug use: No   • Sexual activity: Defer       No Known Allergies    ROS    Review of Systems   Constitutional: Positive for fatigue.   HENT: Positive for congestion,  "postnasal drip, sinus pressure, swollen glands and voice change.    Respiratory: Positive for cough.    All other systems reviewed and are negative.      Vitals:    10/22/19 1302   BP: 138/70   Pulse: 90   Resp: 19   Temp: 96.7 °F (35.9 °C)   TempSrc: Temporal   SpO2: 98%   Weight: 86.7 kg (191 lb 3.2 oz)   Height: 175.3 cm (69\")   PainSc:   2   PainLoc: Throat         Current Outpatient Medications:   •  amLODIPine (NORVASC) 5 MG tablet, Take 1 tablet by mouth Daily., Disp: 30 tablet, Rfl: 5  •  azithromycin (ZITHROMAX Z-KELI) 250 MG tablet, Take 2 tablets the first day, then 1 tablet daily for 4 days., Disp: 6 tablet, Rfl: 0  •  doxycycline (VIBRAMYICN) 100 MG tablet, Take 1 tablet by mouth 2 (Two) Times a Day for 10 days., Disp: 20 tablet, Rfl: 0  •  Fluticasone Furoate (ARNUITY ELLIPTA) 100 MCG/ACT aerosol powder , Inhale 1 puff Daily., Disp: 30 each, Rfl: 5  •  hyoscyamine (LEVSIN) 0.125 MG tablet, Take 1 tablet by mouth 3 (Three) Times a Day Before Meals., Disp: 20 tablet, Rfl: 2  •  promethazine-dextromethorphan (PROMETHAZINE-DM) 6.25-15 MG/5ML syrup, Take 5 mL by mouth 4 (Four) Times a Day As Needed for Cough., Disp: 180 mL, Rfl: 0    PE    Physical Exam   Constitutional: She is oriented to person, place, and time. She appears well-developed and well-nourished.   HENT:   Head: Normocephalic and atraumatic.   Right Ear: A middle ear effusion is present.   Left Ear: A middle ear effusion is present.   Nose: Mucosal edema and congestion present. Right sinus exhibits maxillary sinus tenderness. Left sinus exhibits maxillary sinus tenderness.   Mouth/Throat: Oropharynx is clear and moist.   Neck: Normal range of motion. Neck supple.   Cardiovascular: Normal rate, regular rhythm and normal heart sounds. Exam reveals no gallop and no friction rub.   No murmur heard.  Pulmonary/Chest: Effort normal and breath sounds normal. No stridor. No respiratory distress. She has no wheezes. She has no rales.   Lymphadenopathy: "     She has cervical adenopathy.   Neurological: She is alert and oriented to person, place, and time.   Skin: Skin is warm and dry.   Psychiatric: She has a normal mood and affect. Her behavior is normal. Judgment and thought content normal.   Vitals reviewed.       A/P    Problem List Items Addressed This Visit     None      Visit Diagnoses     Acute URI    -  Primary    Relevant Medications    doxycycline (VIBRAMYICN) 100 MG tablet    promethazine-dextromethorphan (PROMETHAZINE-DM) 6.25-15 MG/5ML syrup    Acute non-recurrent maxillary sinusitis        Relevant Medications    doxycycline (VIBRAMYICN) 100 MG tablet          Plan of care reviewed with patient at the conclusion of today's visit. Education was provided regarding diagnosis, management and any prescribed or recommended OTC medications.  Patient verbalizes understanding of and agreement with management plan.    Return if symptoms worsen or fail to improve.     STEVE Michel

## 2019-10-28 RX ORDER — AMLODIPINE BESYLATE 5 MG/1
TABLET ORAL
Qty: 30 TABLET | Refills: 5 | Status: SHIPPED | OUTPATIENT
Start: 2019-10-28 | End: 2019-12-06

## 2019-12-06 ENCOUNTER — OFFICE VISIT (OUTPATIENT)
Dept: FAMILY MEDICINE CLINIC | Facility: CLINIC | Age: 64
End: 2019-12-06

## 2019-12-06 VITALS
SYSTOLIC BLOOD PRESSURE: 130 MMHG | OXYGEN SATURATION: 98 % | DIASTOLIC BLOOD PRESSURE: 62 MMHG | TEMPERATURE: 96.9 F | HEIGHT: 69 IN | RESPIRATION RATE: 16 BRPM | BODY MASS INDEX: 29.36 KG/M2 | HEART RATE: 84 BPM | WEIGHT: 198.2 LBS

## 2019-12-06 DIAGNOSIS — H65.02 ACUTE SEROUS OTITIS MEDIA OF LEFT EAR, RECURRENCE NOT SPECIFIED: ICD-10-CM

## 2019-12-06 DIAGNOSIS — J06.9 ACUTE URI: Primary | ICD-10-CM

## 2019-12-06 PROCEDURE — 99213 OFFICE O/P EST LOW 20 MIN: CPT | Performed by: FAMILY MEDICINE

## 2019-12-06 RX ORDER — PREDNISONE 10 MG/1
TABLET ORAL
Qty: 12 TABLET | Refills: 0 | Status: SHIPPED | OUTPATIENT
Start: 2019-12-06 | End: 2020-01-06

## 2019-12-06 RX ORDER — AMOXICILLIN AND CLAVULANATE POTASSIUM 875; 125 MG/1; MG/1
1 TABLET, FILM COATED ORAL 2 TIMES DAILY
Qty: 20 TABLET | Refills: 0 | Status: SHIPPED | OUTPATIENT
Start: 2019-12-06 | End: 2020-01-06

## 2019-12-06 NOTE — PROGRESS NOTES
"Subjective   Inez Ladd is a 64 y.o. female seen today for Hoarse and Cough.     Cough   This is a new problem. The current episode started in the past 7 days. The cough is non-productive. Associated symptoms include nasal congestion and postnasal drip. Pertinent negatives include no chest pain, fever or shortness of breath. Associated symptoms comments: hoarseness. The symptoms are aggravated by lying down. She has tried OTC cough suppressant for the symptoms. The treatment provided mild relief.        The following portions of the patient's history were reviewed and updated as appropriate: allergies, current medications, past social history and problem list.    Review of Systems   Constitutional: Negative for fever.   HENT: Positive for postnasal drip and voice change (hoarseness).    Respiratory: Positive for cough. Negative for shortness of breath.    Cardiovascular: Negative for chest pain.       Objective   /62   Pulse 84   Temp 96.9 °F (36.1 °C) (Temporal)   Resp 16   Ht 175.3 cm (69.02\")   Wt 89.9 kg (198 lb 3.2 oz)   LMP  (LMP Unknown)   SpO2 98%   BMI 29.26 kg/m²   Physical Exam   Constitutional: She appears well-developed and well-nourished.   HENT:   Right Ear: External ear normal.   Left Ear: Tympanic membrane is erythematous.   Mouth/Throat: Oropharynx is clear and moist.   Cardiovascular: Normal rate and regular rhythm.   Pulmonary/Chest: Effort normal and breath sounds normal.   Nursing note and vitals reviewed.      Assessment/Plan   Problem List Items Addressed This Visit     None      Visit Diagnoses     Acute URI    -  Primary    Relevant Medications    predniSONE (DELTASONE) 10 MG tablet    amoxicillin-clavulanate (AUGMENTIN) 875-125 MG per tablet    Acute serous otitis media of left ear, recurrence not specified        Relevant Medications    predniSONE (DELTASONE) 10 MG tablet    amoxicillin-clavulanate (AUGMENTIN) 875-125 MG per tablet              Drink plenty fluids.    Rx " for Augmentin 875 mg twice a day #20+0.    Rx for Prednisone 10 mg Take 2 tablets once a day x 4 days, then 1 a day x 4 days. #12+0.    Follow up as needed.              Scribed for Dr Jose Culp by Sujata Kwan CMA.          I, Jose Culp MD, personally performed the services described in this documentation, as scribed by Sujata Kwan in my presence, and is both accurate and complete.        (Please note that portions of this note were completed with a voice recognition program. Efforts were made to edit the dictations,but occasionally words are mis transcribed.)

## 2019-12-16 ENCOUNTER — TELEPHONE (OUTPATIENT)
Dept: FAMILY MEDICINE CLINIC | Facility: CLINIC | Age: 64
End: 2019-12-16

## 2019-12-16 RX ORDER — CLARITHROMYCIN 500 MG/1
500 TABLET, COATED ORAL 2 TIMES DAILY
Qty: 14 TABLET | Refills: 0 | Status: SHIPPED | OUTPATIENT
Start: 2019-12-16 | End: 2020-01-06

## 2019-12-16 RX ORDER — CETIRIZINE HYDROCHLORIDE 10 MG/1
10 TABLET ORAL DAILY
Qty: 30 TABLET | Refills: 0 | Status: SHIPPED | OUTPATIENT
Start: 2019-12-16 | End: 2020-01-07

## 2019-12-16 NOTE — TELEPHONE ENCOUNTER
Rx has been sent in for Biaxin 500 mg # 14, cetirizine 10 mg, and pt advised to use saline nasal spray three times a day.  Pt notified.  BBedgar, CMA

## 2019-12-16 NOTE — TELEPHONE ENCOUNTER
Patient was in on 12/6/19 for non productive cough, nasal congestion and postnasal drip. She was put on prednisone & amoxicillin. She has finished the medication and still sick. She still has nasal congestion and postnasal drip, and now has a deep sounding cough with greenish mucus. She wanted to know is something stronger can be sent in foe her or if she needs to be seen again. Please rahat back and let her know.     [phone]    Uses [facility]jam

## 2019-12-23 ENCOUNTER — TELEPHONE (OUTPATIENT)
Dept: FAMILY MEDICINE CLINIC | Facility: CLINIC | Age: 64
End: 2019-12-23

## 2019-12-23 RX ORDER — BENZONATATE 100 MG/1
100 CAPSULE ORAL 3 TIMES DAILY PRN
Qty: 20 CAPSULE | Refills: 0 | Status: SHIPPED | OUTPATIENT
Start: 2019-12-23 | End: 2020-01-06

## 2019-12-23 RX ORDER — AZITHROMYCIN 250 MG/1
TABLET, FILM COATED ORAL
Qty: 6 TABLET | Refills: 1 | Status: SHIPPED | OUTPATIENT
Start: 2019-12-23 | End: 2020-01-06

## 2019-12-23 NOTE — TELEPHONE ENCOUNTER
Patient was in on 12/6/19, she stated she has finished the medication and is still coughing. She wanted to know is something else can be sent in for her.   Please call patient and advise.   [facility] [address]  Patient call back number is [phone]

## 2019-12-31 ENCOUNTER — TELEPHONE (OUTPATIENT)
Dept: FAMILY MEDICINE CLINIC | Facility: CLINIC | Age: 64
End: 2019-12-31

## 2020-01-02 ENCOUNTER — TELEPHONE (OUTPATIENT)
Dept: FAMILY MEDICINE CLINIC | Facility: CLINIC | Age: 65
End: 2020-01-02

## 2020-01-02 NOTE — TELEPHONE ENCOUNTER
Pt called advised that her insurance will no longer cover Fluticasone Furoate (ARNUITY ELLIPTA) 100 MCG/ACT aerosol powder  As of the 1st. They did give her the name of a medication that they will cover. ASMANEX HFA.  Pt wanted to know if that medication was the equivalent?     [facility]/pharmacy #[id number] - [place], [place] - [address] [facility] [phone] [facility] [phone] FX VERIFIED    Pt call back [phone]

## 2020-01-06 ENCOUNTER — OFFICE VISIT (OUTPATIENT)
Dept: FAMILY MEDICINE CLINIC | Facility: CLINIC | Age: 65
End: 2020-01-06

## 2020-01-06 VITALS
RESPIRATION RATE: 16 BRPM | SYSTOLIC BLOOD PRESSURE: 130 MMHG | TEMPERATURE: 97.1 F | WEIGHT: 190 LBS | DIASTOLIC BLOOD PRESSURE: 70 MMHG | HEART RATE: 86 BPM | HEIGHT: 69 IN | OXYGEN SATURATION: 97 % | BODY MASS INDEX: 28.14 KG/M2

## 2020-01-06 DIAGNOSIS — J06.9 ACUTE URI: Primary | ICD-10-CM

## 2020-01-06 PROCEDURE — 99213 OFFICE O/P EST LOW 20 MIN: CPT | Performed by: FAMILY MEDICINE

## 2020-01-06 RX ORDER — FLUTICASONE PROPIONATE 50 MCG
SPRAY, SUSPENSION (ML) NASAL
Qty: 1 BOTTLE | Refills: 1 | Status: SHIPPED | OUTPATIENT
Start: 2020-01-06 | End: 2020-01-28

## 2020-01-06 NOTE — PROGRESS NOTES
"Subjective   Inez Ladd is a 64 y.o. female seen today for URI.     URI    This is a recurrent problem. The current episode started yesterday. The problem has been unchanged. There has been no fever. Associated symptoms include congestion (ears), coughing (non productive), rhinorrhea and a sore throat. Pertinent negatives include no chest pain, diarrhea or ear pain. She has tried nothing for the symptoms.        The following portions of the patient's history were reviewed and updated as appropriate: allergies, current medications, past social history and problem list.    Review of Systems   Constitutional: Positive for chills. Negative for fever.   HENT: Positive for congestion (ears), rhinorrhea and sore throat. Negative for ear pain.    Respiratory: Positive for cough (non productive). Negative for shortness of breath.    Cardiovascular: Negative for chest pain.   Gastrointestinal: Negative for constipation and diarrhea.       Objective   /70   Pulse 86   Temp 97.1 °F (36.2 °C)   Resp 16   Ht 175.3 cm (69\")   Wt 86.2 kg (190 lb)   LMP  (LMP Unknown)   SpO2 97%   BMI 28.06 kg/m²   Physical Exam   Constitutional: She appears well-developed and well-nourished.   HENT:   Right Ear: External ear normal.   Left Ear: External ear normal.   Nose: Right sinus exhibits frontal sinus tenderness. Left sinus exhibits frontal sinus tenderness.   Mouth/Throat: Oropharynx is clear and moist.   Cardiovascular: Normal rate and regular rhythm.   Pulmonary/Chest: Effort normal and breath sounds normal.   Nursing note and vitals reviewed.      Assessment/Plan   Problem List Items Addressed This Visit     None              Rest and Drink plenty fluids.  Stay off work through Wednesday. OK to return on Thursday.  Continue the Zyrtec as doing.    Rx for Fluticasone nasal spray. 2 sprays in each nostril daily. #1+1.    Sample for Anoro #2.    Follow up as needed.            Scribed for Dr Jose Culp by Sujata Kwan " CMA.          I, Jose Culp MD, personally performed the services described in this documentation, as scribed by Sujata Kwan in my presence, and is both accurate and complete.        (Please note that portions of this note were completed with a voice recognition program. Efforts were made to edit the dictations,but occasionally words are mis transcribed.)

## 2020-01-07 RX ORDER — CETIRIZINE HYDROCHLORIDE 10 MG/1
TABLET ORAL
Qty: 30 TABLET | Refills: 3 | Status: SHIPPED | OUTPATIENT
Start: 2020-01-07 | End: 2020-09-04

## 2020-01-28 DIAGNOSIS — J06.9 ACUTE URI: ICD-10-CM

## 2020-01-28 RX ORDER — FLUTICASONE PROPIONATE 50 MCG
SPRAY, SUSPENSION (ML) NASAL
Qty: 16 ML | Refills: 1 | Status: SHIPPED | OUTPATIENT
Start: 2020-01-28 | End: 2020-03-02

## 2020-03-01 DIAGNOSIS — J06.9 ACUTE URI: ICD-10-CM

## 2020-03-02 RX ORDER — FLUTICASONE PROPIONATE 50 MCG
SPRAY, SUSPENSION (ML) NASAL
Qty: 16 ML | Refills: 1 | Status: SHIPPED | OUTPATIENT
Start: 2020-03-02 | End: 2020-04-06

## 2020-03-13 ENCOUNTER — TELEPHONE (OUTPATIENT)
Dept: FAMILY MEDICINE CLINIC | Facility: CLINIC | Age: 65
End: 2020-03-13

## 2020-03-13 NOTE — TELEPHONE ENCOUNTER
Pt called to request a refill of her Annutiy inhaler    Pt stated that she was supposed to stop taking the medication but would like to continue due to COVID-19     Pt is requesting a refill sent to     [facility] [address]  PH; [phone]  FAX:[phone]

## 2020-04-05 DIAGNOSIS — J06.9 ACUTE URI: ICD-10-CM

## 2020-04-06 RX ORDER — FLUTICASONE PROPIONATE 50 MCG
SPRAY, SUSPENSION (ML) NASAL
Qty: 16 ML | Refills: 1 | Status: SHIPPED | OUTPATIENT
Start: 2020-04-06 | End: 2020-05-07

## 2020-04-27 DIAGNOSIS — I10 ESSENTIAL HYPERTENSION: ICD-10-CM

## 2020-04-27 RX ORDER — AMLODIPINE BESYLATE 5 MG/1
5 TABLET ORAL DAILY
Qty: 30 TABLET | Refills: 5 | Status: SHIPPED | OUTPATIENT
Start: 2020-04-27 | End: 2020-10-28

## 2020-05-07 DIAGNOSIS — J06.9 ACUTE URI: ICD-10-CM

## 2020-05-07 RX ORDER — FLUTICASONE PROPIONATE 50 MCG
SPRAY, SUSPENSION (ML) NASAL
Qty: 16 ML | Refills: 1 | Status: SHIPPED | OUTPATIENT
Start: 2020-05-07 | End: 2020-06-03

## 2020-05-14 RX ORDER — FLUTICASONE FUROATE 100 UG/1
POWDER RESPIRATORY (INHALATION)
Qty: 30 EACH | Refills: 5 | Status: SHIPPED | OUTPATIENT
Start: 2020-05-14 | End: 2020-09-04

## 2020-06-03 DIAGNOSIS — J06.9 ACUTE URI: ICD-10-CM

## 2020-06-03 RX ORDER — FLUTICASONE PROPIONATE 50 MCG
SPRAY, SUSPENSION (ML) NASAL
Qty: 16 ML | Refills: 1 | Status: SHIPPED | OUTPATIENT
Start: 2020-06-03 | End: 2020-06-19 | Stop reason: SDUPTHER

## 2020-06-19 DIAGNOSIS — J06.9 ACUTE URI: ICD-10-CM

## 2020-06-19 RX ORDER — FLUTICASONE PROPIONATE 50 MCG
2 SPRAY, SUSPENSION (ML) NASAL DAILY
Qty: 48 ML | Refills: 1 | Status: SHIPPED | OUTPATIENT
Start: 2020-06-19 | End: 2020-09-04

## 2020-09-04 ENCOUNTER — OFFICE VISIT (OUTPATIENT)
Dept: FAMILY MEDICINE CLINIC | Facility: CLINIC | Age: 65
End: 2020-09-04

## 2020-09-04 VITALS
OXYGEN SATURATION: 98 % | HEART RATE: 84 BPM | HEIGHT: 69 IN | DIASTOLIC BLOOD PRESSURE: 90 MMHG | BODY MASS INDEX: 29.92 KG/M2 | RESPIRATION RATE: 19 BRPM | TEMPERATURE: 97.5 F | WEIGHT: 202 LBS | SYSTOLIC BLOOD PRESSURE: 156 MMHG

## 2020-09-04 DIAGNOSIS — R39.9 UTI SYMPTOMS: Primary | ICD-10-CM

## 2020-09-04 DIAGNOSIS — S39.012A LUMBAR STRAIN, INITIAL ENCOUNTER: ICD-10-CM

## 2020-09-04 LAB
BILIRUB BLD-MCNC: NEGATIVE MG/DL
CLARITY, POC: CLEAR
COLOR UR: YELLOW
EXPIRATION DATE: ABNORMAL
GLUCOSE UR STRIP-MCNC: NEGATIVE MG/DL
KETONES UR QL: NEGATIVE
LEUKOCYTE EST, POC: ABNORMAL
Lab: ABNORMAL
NITRITE UR-MCNC: NEGATIVE MG/ML
PH UR: 5.5 [PH] (ref 5–8)
PROT UR STRIP-MCNC: NEGATIVE MG/DL
RBC # UR STRIP: NEGATIVE /UL
SP GR UR: 1.01 (ref 1–1.03)
UROBILINOGEN UR QL: NORMAL

## 2020-09-04 PROCEDURE — 81003 URINALYSIS AUTO W/O SCOPE: CPT | Performed by: FAMILY MEDICINE

## 2020-09-04 PROCEDURE — 99213 OFFICE O/P EST LOW 20 MIN: CPT | Performed by: FAMILY MEDICINE

## 2020-09-04 RX ORDER — TIZANIDINE 4 MG/1
TABLET ORAL
Qty: 14 TABLET | Refills: 1 | Status: SHIPPED | OUTPATIENT
Start: 2020-09-04 | End: 2020-12-29

## 2020-09-04 RX ORDER — MELOXICAM 15 MG/1
15 TABLET ORAL DAILY
Qty: 7 TABLET | Refills: 1 | Status: SHIPPED | OUTPATIENT
Start: 2020-09-04 | End: 2020-12-29

## 2020-09-04 NOTE — PROGRESS NOTES
"Subjective   Inez Ladd is a 65 y.o. female seen today for Back Pain.     The patient has developed pain on the right side of her lower back over the last week.  She does not recall a specific injury although she has been doing quite a bit of lifting.  No difficulty with dysuria or fever.       The following portions of the patient's history were reviewed and updated as appropriate: allergies, current medications, past social history and problem list.    Review of Systems   Constitutional: Positive for activity change. Negative for chills and fever.   HENT: Negative for congestion.    Eyes: Negative for visual disturbance.   Respiratory: Negative for cough and shortness of breath.    Cardiovascular: Negative for chest pain.   Gastrointestinal: Negative for abdominal pain.   Genitourinary: Negative for difficulty urinating.   Musculoskeletal: Positive for back pain. Negative for gait problem.   Skin: Negative for rash.   Neurological: Negative for syncope, weakness, light-headedness, numbness and headaches.   Psychiatric/Behavioral: Negative for dysphoric mood.       Objective   /90   Pulse 84   Temp 97.5 °F (36.4 °C)   Resp 19   Ht 175.3 cm (69\")   Wt 91.6 kg (202 lb)   LMP  (LMP Unknown)   SpO2 98%   BMI 29.83 kg/m²   Physical Exam   Constitutional: She is oriented to person, place, and time. She appears well-developed and well-nourished.   Abdominal: There is no tenderness.   Musculoskeletal: She exhibits tenderness. She exhibits no edema.   Right paralumbar tightness and tenderness.  Also some tenderness at the sacroiliac joints.  Negative straight leg raising sign.   Neurological: She is alert and oriented to person, place, and time. She displays normal reflexes.       Assessment/Plan   Problem List Items Addressed This Visit     None      Visit Diagnoses     UTI symptoms    -  Primary    Relevant Orders    POCT urinalysis dipstick, automated (Completed)    Lumbar strain, initial encounter        " Relevant Medications    meloxicam (MOBIC) 15 MG tablet    tiZANidine (ZANAFLEX) 4 MG tablet        Urinalysis is normal.    I believe this represents a lumbar strain.  Given a set of low back exercises to do on a daily basis.

## 2020-09-16 ENCOUNTER — TELEPHONE (OUTPATIENT)
Dept: FAMILY MEDICINE CLINIC | Facility: CLINIC | Age: 65
End: 2020-09-16

## 2020-09-16 NOTE — TELEPHONE ENCOUNTER
Caller: Inez Ladd    Relationship: Self    Best call back number: [phone]    What form or medical record are you requesting: MEDICAL RECORD    Who is requesting this form or medical record from you: SELF    How would you like to receive the form or medical records (pick-up, mail, fax): Mail  If fax, what is the fax number:   If mail, what is the address:  [address] [place] [address] [place] [address]  If pick-up, provide patient with address and location details    Timeframe paperwork needed: 09/21/20 OR 09/22/20    Additional notes: Patient stated that she lost her social security card and she was told that she needs a medical record signed and dated by her doctor for to get a replacement.

## 2020-09-17 ENCOUNTER — TELEPHONE (OUTPATIENT)
Dept: FAMILY MEDICINE CLINIC | Facility: CLINIC | Age: 65
End: 2020-09-17

## 2020-10-27 DIAGNOSIS — I10 ESSENTIAL HYPERTENSION: ICD-10-CM

## 2020-10-27 NOTE — TELEPHONE ENCOUNTER
PATIENT CALLED AND REQUESTED A REFILL FOR:   amLODIPine (NORVASC) 5 MG tablet  5 mg, Daily     OUT OF MEDICATION TOMORROW  SHE SAID PHARMACY WAS TO CALL BUT SHE WANTED TO BE SURE REFILL REQUEST RECEIVED.     [facility]/pharmacy #[id number] - [place], [place] - [address] [phone] PH

## 2020-10-28 RX ORDER — AMLODIPINE BESYLATE 5 MG/1
TABLET ORAL
Qty: 90 TABLET | Refills: 0 | Status: SHIPPED | OUTPATIENT
Start: 2020-10-28 | End: 2020-12-08 | Stop reason: SDUPTHER

## 2020-11-16 RX ORDER — FLUTICASONE FUROATE 100 UG/1
POWDER RESPIRATORY (INHALATION)
Qty: 30 EACH | Refills: 5 | Status: SHIPPED | OUTPATIENT
Start: 2020-11-16 | End: 2021-02-15

## 2020-11-30 ENCOUNTER — TELEPHONE (OUTPATIENT)
Dept: FAMILY MEDICINE CLINIC | Facility: CLINIC | Age: 65
End: 2020-11-30

## 2020-11-30 NOTE — TELEPHONE ENCOUNTER
I left a message on the patient's home telephone recommending that she return to see me here at the office to make further assessment as to her pulmonary status.  We have treated her as though she has had intermittent asthma over the last 2 years.  That is not necessarily a kevin allan diagnosis.  She needs repeat spirometry and further record review of her pulmonary status.

## 2020-11-30 NOTE — TELEPHONE ENCOUNTER
THE PATIENT IS REQUESTING A CALL BACK TO KNOW WHEN AND WHY SHE STARTED USING AN INHALER.  SHE NEEDS TO KNOW IF IT WAS IN DEC 2018 FOR PNEUMONIA OR BEFORE OR AFTER THAT FOR SOMETHING ELSE.  THE PATIENT STATES A LIFE INSURANCE COMPANY IS REQUESTING THIS INFORMATION AND SHE IS REQUESTING A CALL BACK FOR CLARIFICATION. PLEASE CALL [phone]

## 2020-12-07 ENCOUNTER — TELEPHONE (OUTPATIENT)
Dept: FAMILY MEDICINE CLINIC | Facility: CLINIC | Age: 65
End: 2020-12-07

## 2020-12-08 DIAGNOSIS — I10 ESSENTIAL HYPERTENSION: ICD-10-CM

## 2020-12-08 RX ORDER — AMLODIPINE BESYLATE 5 MG/1
5 TABLET ORAL DAILY
Qty: 90 TABLET | Refills: 0 | Status: SHIPPED | OUTPATIENT
Start: 2020-12-08 | End: 2021-02-05

## 2020-12-29 ENCOUNTER — OFFICE VISIT (OUTPATIENT)
Dept: FAMILY MEDICINE CLINIC | Facility: CLINIC | Age: 65
End: 2020-12-29

## 2020-12-29 VITALS
TEMPERATURE: 97.1 F | DIASTOLIC BLOOD PRESSURE: 80 MMHG | HEIGHT: 69 IN | HEART RATE: 82 BPM | BODY MASS INDEX: 28.44 KG/M2 | WEIGHT: 192 LBS | RESPIRATION RATE: 18 BRPM | OXYGEN SATURATION: 98 % | SYSTOLIC BLOOD PRESSURE: 132 MMHG

## 2020-12-29 DIAGNOSIS — Z00.00 WELCOME TO MEDICARE PREVENTIVE VISIT: Primary | ICD-10-CM

## 2020-12-29 DIAGNOSIS — Z12.11 SCREEN FOR COLON CANCER: ICD-10-CM

## 2020-12-29 DIAGNOSIS — Z12.31 ENCOUNTER FOR MAMMOGRAM TO ESTABLISH BASELINE MAMMOGRAM: ICD-10-CM

## 2020-12-29 DIAGNOSIS — Z11.59 NEED FOR HEPATITIS C SCREENING TEST: ICD-10-CM

## 2020-12-29 LAB
ALBUMIN SERPL-MCNC: 4.4 G/DL (ref 3.5–5.2)
ALBUMIN/GLOB SERPL: 1.8 G/DL
ALP SERPL-CCNC: 86 U/L (ref 39–117)
ALT SERPL W P-5'-P-CCNC: 12 U/L (ref 1–33)
ANION GAP SERPL CALCULATED.3IONS-SCNC: 8.7 MMOL/L (ref 5–15)
AST SERPL-CCNC: 17 U/L (ref 1–32)
BASOPHILS # BLD AUTO: 0.03 10*3/MM3 (ref 0–0.2)
BASOPHILS NFR BLD AUTO: 0.4 % (ref 0–1.5)
BILIRUB SERPL-MCNC: 0.6 MG/DL (ref 0–1.2)
BUN SERPL-MCNC: 17 MG/DL (ref 8–23)
BUN/CREAT SERPL: 23.9 (ref 7–25)
CALCIUM SPEC-SCNC: 9.8 MG/DL (ref 8.6–10.5)
CHLORIDE SERPL-SCNC: 104 MMOL/L (ref 98–107)
CHOLEST SERPL-MCNC: 195 MG/DL (ref 0–200)
CO2 SERPL-SCNC: 27.3 MMOL/L (ref 22–29)
CREAT SERPL-MCNC: 0.71 MG/DL (ref 0.57–1)
DEPRECATED RDW RBC AUTO: 38.8 FL (ref 37–54)
EOSINOPHIL # BLD AUTO: 0.12 10*3/MM3 (ref 0–0.4)
EOSINOPHIL NFR BLD AUTO: 1.7 % (ref 0.3–6.2)
ERYTHROCYTE [DISTWIDTH] IN BLOOD BY AUTOMATED COUNT: 12 % (ref 12.3–15.4)
GFR SERPL CREATININE-BSD FRML MDRD: 83 ML/MIN/1.73
GLOBULIN UR ELPH-MCNC: 2.4 GM/DL
GLUCOSE SERPL-MCNC: 100 MG/DL (ref 65–99)
HCT VFR BLD AUTO: 43 % (ref 34–46.6)
HCV AB SER DONR QL: NORMAL
HDLC SERPL-MCNC: 70 MG/DL (ref 40–60)
HGB BLD-MCNC: 13.9 G/DL (ref 12–15.9)
IMM GRANULOCYTES # BLD AUTO: 0.02 10*3/MM3 (ref 0–0.05)
IMM GRANULOCYTES NFR BLD AUTO: 0.3 % (ref 0–0.5)
LDLC SERPL CALC-MCNC: 106 MG/DL (ref 0–100)
LDLC/HDLC SERPL: 1.48 {RATIO}
LYMPHOCYTES # BLD AUTO: 2.17 10*3/MM3 (ref 0.7–3.1)
LYMPHOCYTES NFR BLD AUTO: 31.2 % (ref 19.6–45.3)
MCH RBC QN AUTO: 28.6 PG (ref 26.6–33)
MCHC RBC AUTO-ENTMCNC: 32.3 G/DL (ref 31.5–35.7)
MCV RBC AUTO: 88.5 FL (ref 79–97)
MONOCYTES # BLD AUTO: 0.44 10*3/MM3 (ref 0.1–0.9)
MONOCYTES NFR BLD AUTO: 6.3 % (ref 5–12)
NEUTROPHILS NFR BLD AUTO: 4.18 10*3/MM3 (ref 1.7–7)
NEUTROPHILS NFR BLD AUTO: 60.1 % (ref 42.7–76)
NRBC BLD AUTO-RTO: 0 /100 WBC (ref 0–0.2)
PLATELET # BLD AUTO: 265 10*3/MM3 (ref 140–450)
PMV BLD AUTO: 10.6 FL (ref 6–12)
POTASSIUM SERPL-SCNC: 4.4 MMOL/L (ref 3.5–5.2)
PROT SERPL-MCNC: 6.8 G/DL (ref 6–8.5)
RBC # BLD AUTO: 4.86 10*6/MM3 (ref 3.77–5.28)
SODIUM SERPL-SCNC: 140 MMOL/L (ref 136–145)
TRIGL SERPL-MCNC: 107 MG/DL (ref 0–150)
TSH SERPL DL<=0.05 MIU/L-ACNC: 0.3 UIU/ML (ref 0.27–4.2)
VLDLC SERPL-MCNC: 19 MG/DL (ref 5–40)
WBC # BLD AUTO: 6.96 10*3/MM3 (ref 3.4–10.8)

## 2020-12-29 PROCEDURE — 1125F AMNT PAIN NOTED PAIN PRSNT: CPT | Performed by: FAMILY MEDICINE

## 2020-12-29 PROCEDURE — 85025 COMPLETE CBC W/AUTO DIFF WBC: CPT | Performed by: FAMILY MEDICINE

## 2020-12-29 PROCEDURE — 80053 COMPREHEN METABOLIC PANEL: CPT | Performed by: FAMILY MEDICINE

## 2020-12-29 PROCEDURE — 86803 HEPATITIS C AB TEST: CPT | Performed by: FAMILY MEDICINE

## 2020-12-29 PROCEDURE — 81003 URINALYSIS AUTO W/O SCOPE: CPT | Performed by: FAMILY MEDICINE

## 2020-12-29 PROCEDURE — 1170F FXNL STATUS ASSESSED: CPT | Performed by: FAMILY MEDICINE

## 2020-12-29 PROCEDURE — G0403 EKG FOR INITIAL PREVENT EXAM: HCPCS | Performed by: FAMILY MEDICINE

## 2020-12-29 PROCEDURE — G0402 INITIAL PREVENTIVE EXAM: HCPCS | Performed by: FAMILY MEDICINE

## 2020-12-29 PROCEDURE — 84443 ASSAY THYROID STIM HORMONE: CPT | Performed by: FAMILY MEDICINE

## 2020-12-29 PROCEDURE — 1159F MED LIST DOCD IN RCRD: CPT | Performed by: FAMILY MEDICINE

## 2020-12-29 PROCEDURE — 80061 LIPID PANEL: CPT | Performed by: FAMILY MEDICINE

## 2020-12-30 ENCOUNTER — LAB (OUTPATIENT)
Dept: FAMILY MEDICINE CLINIC | Facility: CLINIC | Age: 65
End: 2020-12-30

## 2020-12-30 LAB
BILIRUB BLD-MCNC: NEGATIVE MG/DL
CLARITY, POC: CLEAR
COLOR UR: YELLOW
GLUCOSE UR STRIP-MCNC: NEGATIVE MG/DL
KETONES UR QL: NEGATIVE
LEUKOCYTE EST, POC: NEGATIVE
NITRITE UR-MCNC: NEGATIVE MG/ML
PH UR: 5.5 [PH] (ref 5–8)
PROT UR STRIP-MCNC: NEGATIVE MG/DL
RBC # UR STRIP: NEGATIVE /UL
SP GR UR: 1.02 (ref 1–1.03)
UROBILINOGEN UR QL: NORMAL

## 2021-01-06 ENCOUNTER — TELEPHONE (OUTPATIENT)
Dept: FAMILY MEDICINE CLINIC | Facility: CLINIC | Age: 66
End: 2021-01-06

## 2021-01-06 NOTE — TELEPHONE ENCOUNTER
----- Message from Sujata Kwan MA sent at 1/6/2021  9:55 AM EST -----  Leslie could you check this  ----- Message -----  From: Jose Culp MD  Sent: 1/5/2021   7:10 PM EST  To: Sujata Kwan MA    This form needs the patient's phone number.    Phone number has been provided and form has been faxed back.  BBedgar, RITA

## 2021-01-06 NOTE — TELEPHONE ENCOUNTER
----- Message from Sujata Kwan MA sent at 1/6/2021  9:56 AM EST -----    ----- Message -----  From: Jose Culp MD  Sent: 1/5/2021   7:12 PM EST  To: Sujata Kwan MA    Here it is!  I guess we just need to send this back.    Phone number has been provided and faxed back.  Yogi, RITA

## 2021-02-05 DIAGNOSIS — I10 ESSENTIAL HYPERTENSION: ICD-10-CM

## 2021-02-05 RX ORDER — AMLODIPINE BESYLATE 5 MG/1
TABLET ORAL
Qty: 90 TABLET | Refills: 1 | Status: SHIPPED | OUTPATIENT
Start: 2021-02-05 | End: 2021-08-02

## 2021-02-15 ENCOUNTER — OFFICE VISIT (OUTPATIENT)
Dept: OBSTETRICS AND GYNECOLOGY | Facility: CLINIC | Age: 66
End: 2021-02-15

## 2021-02-15 VITALS
BODY MASS INDEX: 28.17 KG/M2 | HEIGHT: 69 IN | SYSTOLIC BLOOD PRESSURE: 138 MMHG | WEIGHT: 190.2 LBS | DIASTOLIC BLOOD PRESSURE: 74 MMHG

## 2021-02-15 DIAGNOSIS — N81.4 CYSTOCELE WITH UTERINE PROLAPSE: ICD-10-CM

## 2021-02-15 DIAGNOSIS — N39.41 URGE INCONTINENCE: ICD-10-CM

## 2021-02-15 DIAGNOSIS — Z01.419 ENCOUNTER FOR ANNUAL ROUTINE GYNECOLOGICAL EXAMINATION: Primary | ICD-10-CM

## 2021-02-15 PROCEDURE — 99213 OFFICE O/P EST LOW 20 MIN: CPT | Performed by: OBSTETRICS & GYNECOLOGY

## 2021-02-15 PROCEDURE — 99397 PER PM REEVAL EST PAT 65+ YR: CPT | Performed by: OBSTETRICS & GYNECOLOGY

## 2021-02-15 RX ORDER — ACETAMINOPHEN 500 MG
500 TABLET ORAL EVERY 6 HOURS PRN
COMMUNITY

## 2021-02-15 RX ORDER — ESTRADIOL 0.1 MG/G
CREAM VAGINAL
Qty: 1 EACH | Refills: 3 | Status: SHIPPED | OUTPATIENT
Start: 2021-02-15

## 2021-02-15 NOTE — PROGRESS NOTES
GYN Annual Exam     CC - Here for consult for pessary but has not had a pap test in many years. Patient is a patient new to me.  She has not been seen in our office since  (per Daniel). PT used to see Dr. Ardon here in this practice.     Patient has a pessary that has been placed for bladder prolapse with Dr. Ardon. She feels that the pessary may need to be resized or adjusted. She is experiencing urinary urgency, UA was performed at outside office that showed negative reading.     Subjective   HPI  Inez Ladd is a 65 y.o. female, , who presents for annual well woman exam.  She is postmenopausal.  Patient reports problems with: none.  Partner Status: Marital Status: .  New Partners since last visit: no.      Additional OB/GYN History   Current contraception: contraceptive methods: Post menopausal status  Desires to: do not start contraception  Last Pap : 2014  Last Completed Pap Smear       Status Date      PAP SMEAR Done 2014 Normal        History of abnormal Pap smear: no  Family history of uterine, colon, breast, or ovarian cancer: no  Performs monthly Self-Breast Exam: no  Last mammogram: scheduled for 2021  Last Completed Mammogram       Status Date      MAMMOGRAM No completions recorded        Last colonoscopy:   Last Completed Colonoscopy       Status Date      COLONOSCOPY No completions recorded        Last DEXA: Unsure of when this was, reports that it was done at [facility]  Exercises Regularly: yes  Feelings of Anxiety or Depression: no    Tobacco Usage?: No   OB History        2    Para   2    Term   2            AB        Living   2       SAB        TAB        Ectopic        Molar        Multiple        Live Births   2                Health Maintenance   Topic Date Due   • DXA SCAN  1955   • MAMMOGRAM  2016   • INFLUENZA VACCINE  2021 (Originally 2020)   • Pneumococcal Vaccine 65+ (1 of 1 - PPSV23) 2021  (Originally 2/18/2020)   • TDAP/TD VACCINES (1 - Tdap) 01/04/2022 (Originally 2/18/1974)   • ZOSTER VACCINE (1 of 2) 01/07/2022 (Originally 2/18/2005)   • PAP SMEAR  10/20/2022 (Originally 4/1/2017)   • COLONOSCOPY  12/25/2023 (Originally 1955)   • ANNUAL WELLNESS VISIT  12/29/2021   • HEPATITIS C SCREENING  Completed   • MENINGOCOCCAL VACCINE  Aged Out       The additional following portions of the patient's history were reviewed and updated as appropriate: allergies, current medications, past family history, past medical history, past social history, past surgical history and problem list.    Review of Systems   Constitutional: Negative.  Negative for appetite change, fatigue, unexpected weight gain and unexpected weight loss.   HENT: Negative.  Negative for congestion, nosebleeds, sinus pressure and sore throat.    Eyes: Negative.  Negative for visual disturbance.   Respiratory: Negative.  Negative for cough and shortness of breath.    Cardiovascular: Negative.  Negative for chest pain, palpitations and leg swelling.   Gastrointestinal: Negative.  Negative for abdominal pain, constipation, diarrhea, nausea and vomiting.   Endocrine: Negative.  Negative for cold intolerance, heat intolerance, polydipsia and polyuria.   Genitourinary: Positive for urgency. Negative for breast discharge, dysuria, frequency and genital sores.   Musculoskeletal: Negative.  Negative for joint swelling and myalgias.   Skin: Negative.  Negative for rash, skin lesions and bruise.   Allergic/Immunologic: Negative.    Neurological: Negative.  Negative for weakness, numbness and headache.   Hematological: Negative.  Negative for adenopathy. Does not bruise/bleed easily.   Psychiatric/Behavioral: Negative.  Negative for sleep disturbance and suicidal ideas.       I have reviewed and agree with the HPI, ROS, and historical information as entered above. Grace Tucker MD    Objective   /74 (BP Location: Right arm, Patient  "Position: Sitting, Cuff Size: Adult)   Ht 175.3 cm (69\")   Wt 86.3 kg (190 lb 3.2 oz)   LMP  (LMP Unknown)   Breastfeeding No   BMI 28.09 kg/m²     Physical Exam    General:  well developed; well nourished  no acute distress  Skin:  No suspicious lesions seen  Thyroid: normal to inspection and palpation  Breasts:  Deferred, done by PCP at last visit  CVS: RRR, no M/R/G, distal pulses wnl  Resp: CTAB, No W/R/R  Abdomen: soft, non-tender; no masses  no umbilical or inguinal hernias are present  no hepato-splenomegaly  Pelvis: Clinical staff was present for exam  External genitalia:  normal appearance of the external genitalia including Bartholin's and Hale's glands.  Urethra: no masses or tenderness  Urethral meatus: normal size;  No lesions There is small amt ureteral prolapse  Bladder: non tender to palpation, no masses  Vagina:  Small amount of abrasion on anterior vaginal wall from pessary, grade 3-4 cystocele. No rectocele  Cervix:  normal appearance.  Uterus:  normal size, shape and consistency.  Grade 2 uterine prolapse  Adnexa:  normal bimanual exam of the adnexa.  Perineum/Anus: normal appearance, no external hemorrhoids  Ext: 2+ pulses, no edema    Assessment/Plan     Assessment         Encounter for annual routine gynecological examination - Primary    Relevant Orders    Pap IG, Rfx HPV ASCU    Urge incontinence    Cystocele with uterine prolapse    Relevant Medications    estradiol (ESTRACE VAGINAL) 0.1 MG/GM vaginal cream            Plan     1. Pap screening guidelines reviewed  2. Pap, reflex HPV done  3. Reviewed exercise and healthy diet as a preventative health measures.   4. If low to average risk, cologuard risks/benefits discussed as an option as well.  5. RTC in 1 year or PRN with problems  6. Other: Patient fitted with #6 ring with support.  We will call her when it's in.  Recommend estrogen cream PV.  Discussed and I have given written information about anterior repair.  Would plan for " TVH/BSO/Kimberly's culdoplasty if she wants anterior repair at same time.    Grace Tucker MD  02/15/2021

## 2021-02-24 DIAGNOSIS — Z01.419 ENCOUNTER FOR ANNUAL ROUTINE GYNECOLOGICAL EXAMINATION: ICD-10-CM

## 2021-03-01 ENCOUNTER — TELEPHONE (OUTPATIENT)
Dept: OBSTETRICS AND GYNECOLOGY | Facility: CLINIC | Age: 66
End: 2021-03-01

## 2021-03-01 NOTE — TELEPHONE ENCOUNTER
Attempted to reach PT to schedule for repeat pap smear and pessary p/u, no answer, left message for PT to call back and be scheduled for 03/08/2021 or after to ensure pessary that we have ordered will be delivered. Requested her to call back to schedule.

## 2021-03-15 ENCOUNTER — OFFICE VISIT (OUTPATIENT)
Dept: OBSTETRICS AND GYNECOLOGY | Facility: CLINIC | Age: 66
End: 2021-03-15

## 2021-03-15 VITALS
WEIGHT: 188.8 LBS | TEMPERATURE: 96.3 F | SYSTOLIC BLOOD PRESSURE: 152 MMHG | DIASTOLIC BLOOD PRESSURE: 84 MMHG | BODY MASS INDEX: 27.96 KG/M2 | HEIGHT: 69 IN

## 2021-03-15 DIAGNOSIS — R87.619 UNSPECIFIED ABNORMAL CYTOLOGICAL FINDINGS IN SPECIMENS FROM CERVIX UTERI: Primary | ICD-10-CM

## 2021-03-15 DIAGNOSIS — R87.619 UNSPECIFIED ABNORMAL CYTOLOGICAL FINDINGS IN SPECIMENS FROM CERVIX UTERI: ICD-10-CM

## 2021-03-15 DIAGNOSIS — Z46.89 FITTING AND ADJUSTMENT OF PESSARY: ICD-10-CM

## 2021-03-15 PROCEDURE — 57160 INSERT PESSARY/OTHER DEVICE: CPT | Performed by: OBSTETRICS & GYNECOLOGY

## 2021-03-15 PROCEDURE — 99213 OFFICE O/P EST LOW 20 MIN: CPT | Performed by: OBSTETRICS & GYNECOLOGY

## 2021-03-15 PROCEDURE — A4562 PESSARY, NON RUBBER,ANY TYPE: HCPCS | Performed by: OBSTETRICS & GYNECOLOGY

## 2021-03-15 NOTE — PROGRESS NOTES
Chief Complaint   Patient presents with   • Follow-up     Repeat Pap   • Pessary Check       Subjective   HPI  Inez Ladd is a 66 y.o. female, , who presents for follow up on pessary placement and repeat pap smear.        Her last LMP was No LMP recorded (lmp unknown). Patient is postmenopausal..  Periods are absent secondary to postmenopausal status. Patient reports problems with: none.  Partner Status: Marital Status: .  New Partners since last visit: no.  Desires STD Screening: no.    Additional OB/GYN History   Current contraception: contraceptive methods: Post menopausal status  Desires to: do not start contraception  Last Pap :   Last Completed Pap Smear       Status Date      PAP SMEAR Done 2/15/2021 PAP IG, RFX HPV ASCU     Patient has more history with this topic...        History of abnormal Pap smear: no  Last mammogram:   Last Completed Mammogram       Status Date      MAMMOGRAM No completions recorded        Tobacco Usage?: No   OB History        2    Para   2    Term   2            AB        Living   2       SAB        TAB        Ectopic        Molar        Multiple        Live Births   2                Health Maintenance   Topic Date Due   • DXA SCAN  Never done   • MAMMOGRAM  Never done   • INFLUENZA VACCINE  2021 (Originally 2020)   • Pneumococcal Vaccine 65+ (1 of 1 - PPSV23) 2021 (Originally 2020)   • TDAP/TD VACCINES (1 - Tdap) 2022 (Originally 1974)   • ZOSTER VACCINE (1 of 2) 2022 (Originally 2005)   • COLONOSCOPY  2023 (Originally 1955)   • ANNUAL WELLNESS VISIT  2021   • PAP SMEAR  02/15/2024   • HEPATITIS C SCREENING  Completed   • COVID-19 Vaccine  Completed   • MENINGOCOCCAL VACCINE  Aged Out       The additional following portions of the patient's history were reviewed and updated as appropriate: allergies, current medications, past family history, past medical history, past social history, past  "surgical history and problem list.    Review of Systems   Constitutional: Negative for appetite change, fatigue, unexpected weight gain and unexpected weight loss.   HENT: Negative for congestion, nosebleeds, sinus pressure and sore throat.    Eyes: Negative for visual disturbance.   Respiratory: Negative for cough and shortness of breath.    Cardiovascular: Negative for chest pain, palpitations and leg swelling.   Gastrointestinal: Negative for abdominal pain, constipation, diarrhea, nausea and vomiting.   Endocrine: Negative for cold intolerance, heat intolerance, polydipsia and polyuria.   Genitourinary: Negative for breast discharge, dysuria, frequency, genital sores and urgency.   Musculoskeletal: Negative for joint swelling and myalgias.   Skin: Negative for rash, skin lesions and bruise.   Neurological: Negative for weakness, numbness and headache.   Hematological: Negative for adenopathy. Does not bruise/bleed easily.   Psychiatric/Behavioral: Negative for sleep disturbance and suicidal ideas.       I have reviewed and agree with the HPI, ROS, and historical information as entered above. Grace Tucker MD    Objective   /84 (BP Location: Right arm, Patient Position: Sitting, Cuff Size: Adult)   Temp 96.3 °F (35.7 °C)   Ht 175.3 cm (69\")   Wt 85.6 kg (188 lb 12.8 oz)   LMP  (LMP Unknown)   Breastfeeding No   BMI 27.88 kg/m²     Physical Exam       General:  well developed; well nourished  no acute distress  Skin:  No suspicious lesions seen  Resp: CTAB, No W/R/R  Abdomen: soft, non-tender; no masses  no umbilical or inguinal hernias are present  no hepato-splenomegaly  Pelvis: Clinical staff was present for exam  External genitalia:  normal appearance of the external genitalia including Bartholin's and [place]'s glands.  Urethra: no masses or tenderness  Urethral meatus: normal size;  No lesions small amount of prolapse  Bladder: non tender to palpation, no masses, Grade 4 cystocele  Vagina:  " normal pink mucosa without prolapse or lesions.  Cervix:  atrophic  Bimanual not performed  Perineum/Anus: normal appearance, no external hemorrhoids  Ext: 2+ pulses, no edema    Assessment/Plan     Assessment             Fitting and adjustment of pessary    Unspecified abnormal cytological findings in specimens from cervix uteri     Relevant Orders    Pap IG, HPV-hr          Plan     Return in about 1 year (around 3/15/2022) for Annual physical.  2.   Patient will call with concerns, but she is used to using pessary  3.   #6 ring with support placed.      Grace Tucker MD  03/15/2021

## 2021-03-19 DIAGNOSIS — R87.619 UNSPECIFIED ABNORMAL CYTOLOGICAL FINDINGS IN SPECIMENS FROM CERVIX UTERI: ICD-10-CM

## 2021-04-16 ENCOUNTER — OFFICE VISIT (OUTPATIENT)
Dept: FAMILY MEDICINE CLINIC | Facility: CLINIC | Age: 66
End: 2021-04-16

## 2021-04-16 VITALS
WEIGHT: 190 LBS | HEIGHT: 69 IN | TEMPERATURE: 97.7 F | HEART RATE: 71 BPM | DIASTOLIC BLOOD PRESSURE: 72 MMHG | SYSTOLIC BLOOD PRESSURE: 130 MMHG | OXYGEN SATURATION: 98 % | BODY MASS INDEX: 28.14 KG/M2

## 2021-04-16 DIAGNOSIS — M54.50 ACUTE BILATERAL LOW BACK PAIN WITHOUT SCIATICA: Primary | ICD-10-CM

## 2021-04-16 PROCEDURE — 99213 OFFICE O/P EST LOW 20 MIN: CPT | Performed by: FAMILY MEDICINE

## 2021-04-16 RX ORDER — TIZANIDINE 4 MG/1
4 TABLET ORAL 2 TIMES DAILY
Qty: 20 TABLET | Refills: 1 | Status: SHIPPED | OUTPATIENT
Start: 2021-04-16 | End: 2021-11-11

## 2021-04-16 NOTE — PROGRESS NOTES
Follow Up Office Visit      Patient Name: Inez Ladd  : 1955   MRN: [id number]     Chief Complaint:    Chief Complaint   Patient presents with   • Back Pain       History of Present Illness: Inez Ladd is a 66 y.o. female who is here today to follow up with low back pain. Back pain started Monday after doing yard work all day. Patient did have an injury that was noticed all at once. Patient went to work Tuesday and done a lot of lifting that aggravated pain. Pain occurs with bending over and some with sitting. Pain is achey and sharp at times. Pain starts midline in the lumbar region. Pain radiates straight across the lower back bilaterally. Pain 4/10 sitting, 8/10 with bending over. Luc OTC used for pain relief, patient states this does help to relieve some of the pain. Patient states she has had back pain before that she was given tizanidine for.        Review of systems: positive for back pain in lower back. Negative for CVA tenderness or dysuria      Physical exam: mood and affect appropriate. Heart and lung exam normal. No pain with palpation. Straight leg test negative. Slump test positive for pain.     Subjective        I have reviewed and the following portions of the patient's history were updated as appropriate: past family history, past medical history, past social history, past surgical history and problem list.    Medications:     Current Outpatient Medications:   •  acetaminophen (TYLENOL) 500 MG tablet, Take 500 mg by mouth Every 6 (Six) Hours As Needed for Mild Pain ., Disp: , Rfl:   •  amLODIPine (NORVASC) 5 MG tablet, TAKE 1 TABLET BY MOUTH EVERY DAY, Disp: 90 tablet, Rfl: 1  •  Aspirin-Caffeine (LUC BACK & BODY PO), Take 1 tablet by mouth Every Other Day., Disp: , Rfl:   •  estradiol (ESTRACE VAGINAL) 0.1 MG/GM vaginal cream, Insert 1 gm intravaginally 3 times each week for 3 weeks and then weekly thereafter, Disp: 1 each, Rfl: 3  •  Misc. Devices (Ring Pessary/Support) misc,  "1 each Daily. #6 ring with support, Disp: 1 each, Rfl: 0  •  tiZANidine (ZANAFLEX) 4 MG tablet, Take 1 tablet by mouth 2 (two) times a day., Disp: 20 tablet, Rfl: 1    Allergies:   No Known Allergies    Objective     Physical Exam: Please see [facility] for physical exam  Vital Signs:   Vitals:    04/16/21 1308   BP: 130/72   Pulse: 71   Temp: 97.7 °F (36.5 °C)   SpO2: 98%   Weight: 86.2 kg (190 lb)   Height: 175.3 cm (69\")     Body mass index is 28.06 kg/m².          Assessment / Plan      Assessment/Plan:   Diagnoses and all orders for this visit:    1. Acute bilateral low back pain without sciatica (Primary)  -     tiZANidine (ZANAFLEX) 4 MG tablet; Take 1 tablet by mouth 2 (two) times a day.  Dispense: 20 tablet; Refill: 1    Recommend at home exercises for which I gave her paperwork for today.  Follow-up as needed.    Follow Up:   No follow-ups on file.    Curt Sewell DO  [facility] [place]       Please note that portions of this note may have been completed with a voice recognition program. Efforts were made to edit the dictations, but occasionally words are mistranscribed.     "

## 2021-04-16 NOTE — PATIENT INSTRUCTIONS
Low Back Sprain or Strain Rehab  Ask your health care provider which exercises are safe for you. Do exercises exactly as told by your health care provider and adjust them as directed. It is normal to feel mild stretching, pulling, tightness, or discomfort as you do these exercises. Stop right away if you feel sudden pain or your pain gets worse. Do not begin these exercises until told by your health care provider.  Stretching and range-of-motion exercises  These exercises warm up your muscles and joints and improve the movement and flexibility of your back. These exercises also help to relieve pain, numbness, and tingling.  Lumbar rotation    1. Lie on your back on a firm surface and bend your knees.  2. Straighten your arms out to your sides so each arm forms a 90-degree angle (right angle) with a side of your body.  3. Slowly move (rotate) both of your knees to one side of your body until you feel a stretch in your lower back (lumbar). Try not to let your shoulders lift off the floor.  4. Hold this position for __________ seconds.  5. Tense your abdominal muscles and slowly move your knees back to the starting position.  6. Repeat this exercise on the other side of your body.  Repeat __________ times. Complete this exercise __________ times a day.  Single knee to chest    1. Lie on your back on a firm surface with both legs straight.  2. Bend one of your knees. Use your hands to move your knee up toward your chest until you feel a gentle stretch in your lower back and buttock.  ? Hold your leg in this position by holding on to the front of your knee.  ? Keep your other leg as straight as possible.  3. Hold this position for __________ seconds.  4. Slowly return to the starting position.  5. Repeat with your other leg.  Repeat __________ times. Complete this exercise __________ times a day.  Prone extension on elbows    1. Lie on your abdomen on a firm surface (prone position).  2. Prop yourself up on your  elbows.  3. Use your arms to help lift your chest up until you feel a gentle stretch in your abdomen and your lower back.  ? This will place some of your body weight on your elbows. If this is uncomfortable, try stacking pillows under your chest.  ? Your hips should stay down, against the surface that you are lying on. Keep your hip and back muscles relaxed.  4. Hold this position for __________ seconds.  5. Slowly relax your upper body and return to the starting position.  Repeat __________ times. Complete this exercise __________ times a day.  Strengthening exercises  These exercises build strength and endurance in your back. Endurance is the ability to use your muscles for a long time, even after they get tired.  Pelvic tilt  This exercise strengthens the muscles that lie deep in the abdomen.  1. Lie on your back on a firm surface. Bend your knees and keep your feet flat on the floor.  2. Tense your abdominal muscles. Tip your pelvis up toward the ceiling and flatten your lower back into the floor.  ? To help with this exercise, you may place a small towel under your lower back and try to push your back into the towel.  3. Hold this position for __________ seconds.  4. Let your muscles relax completely before you repeat this exercise.  Repeat __________ times. Complete this exercise __________ times a day.  Alternating arm and leg raises    1. Get on your hands and knees on a firm surface. If you are on a hard floor, you may want to use padding, such as an exercise mat, to cushion your knees.  2. Line up your arms and legs. Your hands should be directly below your shoulders, and your knees should be directly below your hips.  3. Lift your left leg behind you. At the same time, raise your right arm and straighten it in front of you.  ? Do not lift your leg higher than your hip.  ? Do not lift your arm higher than your shoulder.  ? Keep your abdominal and back muscles tight.  ? Keep your hips facing the  ground.  ? Do not arch your back.  ? Keep your balance carefully, and do not hold your breath.  4. Hold this position for __________ seconds.  5. Slowly return to the starting position.  6. Repeat with your right leg and your left arm.  Repeat __________ times. Complete this exercise __________ times a day.  Abdominal set with straight leg raise    1. Lie on your back on a firm surface.  2. Bend one of your knees and keep your other leg straight.  3. Tense your abdominal muscles and lift your straight leg up, 4-6 inches (10-15 cm) off the ground.  4. Keep your abdominal muscles tight and hold this position for __________ seconds.  ? Do not hold your breath.  ? Do not arch your back. Keep it flat against the ground.  5. Keep your abdominal muscles tense as you slowly lower your leg back to the starting position.  6. Repeat with your other leg.  Repeat __________ times. Complete this exercise __________ times a day.  Single leg lower with bent knees  1. Lie on your back on a firm surface.  2. Tense your abdominal muscles and lift your feet off the floor, one foot at a time, so your knees and hips are bent in 90-degree angles (right angles).  ? Your knees should be over your hips and your lower legs should be parallel to the floor.  3. Keeping your abdominal muscles tense and your knee bent, slowly lower one of your legs so your toe touches the ground.  4. Lift your leg back up to return to the starting position.  ? Do not hold your breath.  ? Do not let your back arch. Keep your back flat against the ground.  5. Repeat with your other leg.  Repeat __________ times. Complete this exercise __________ times a day.  Posture and body mechanics  Good posture and healthy body mechanics can help to relieve stress in your body's tissues and joints. Body mechanics refers to the movements and positions of your body while you do your daily activities. Posture is part of body mechanics. Good posture means:  · Your spine is in its  natural S-curve position (neutral).  · Your shoulders are pulled back slightly.  · Your head is not tipped forward.  Follow these guidelines to improve your posture and body mechanics in your everyday activities.  Standing    · When standing, keep your spine neutral and your feet about hip width apart. Keep a slight bend in your knees. Your ears, shoulders, and hips should line up.  · When you do a task in which you  one place for a long time, place one foot up on a stable object that is 2-4 inches (5-10 cm) high, such as a footstool. This helps keep your spine neutral.  Sitting    · When sitting, keep your spine neutral and keep your feet flat on the floor. Use a footrest, if necessary, and keep your thighs parallel to the floor. Avoid rounding your shoulders, and avoid tilting your head forward.  · When working at a desk or a computer, keep your desk at a height where your hands are slightly lower than your elbows. Slide your chair under your desk so you are close enough to maintain good posture.  · When working at a computer, place your monitor at a height where you are looking straight ahead and you do not have to tilt your head forward or downward to look at the screen.  Resting  · When lying down and resting, avoid positions that are most painful for you.  · If you have pain with activities such as sitting, bending, stooping, or squatting, lie in a position in which your body does not bend very much. For example, avoid curling up on your side with your arms and knees near your chest (fetal position).  · If you have pain with activities such as standing for a long time or reaching with your arms, lie with your spine in a neutral position and bend your knees slightly. Try the following positions:  ? Lying on your side with a pillow between your knees.  ? Lying on your back with a pillow under your knees.  Lifting    · When lifting objects, keep your feet at least shoulder width apart and tighten your  abdominal muscles.  · Bend your knees and hips and keep your spine neutral. It is important to lift using the strength of your legs, not your back. Do not lock your knees straight out.  · Always ask for help to lift heavy or awkward objects.  This information is not intended to replace advice given to you by your health care provider. Make sure you discuss any questions you have with your health care provider.  Document Revised: 04/10/2020 Document Reviewed: 01/09/2020  Elsevier Patient Education © 2021 [employer]

## 2021-04-22 ENCOUNTER — HOSPITAL ENCOUNTER (OUTPATIENT)
Dept: MAMMOGRAPHY | Facility: HOSPITAL | Age: 66
Discharge: HOME | End: 2021-04-22
Admitting: FAMILY MEDICINE

## 2021-04-22 DIAGNOSIS — Z12.31 ENCOUNTER FOR MAMMOGRAM TO ESTABLISH BASELINE MAMMOGRAM: ICD-10-CM

## 2021-04-22 PROCEDURE — 77067 SCR MAMMO BI INCL CAD: CPT

## 2021-04-22 PROCEDURE — 77067 SCR MAMMO BI INCL CAD: CPT | Performed by: RADIOLOGY

## 2021-04-22 PROCEDURE — 77063 BREAST TOMOSYNTHESIS BI: CPT

## 2021-04-22 PROCEDURE — 77063 BREAST TOMOSYNTHESIS BI: CPT | Performed by: RADIOLOGY

## 2021-08-02 DIAGNOSIS — I10 ESSENTIAL HYPERTENSION: ICD-10-CM

## 2021-08-02 RX ORDER — AMLODIPINE BESYLATE 5 MG/1
TABLET ORAL
Qty: 90 TABLET | Refills: 1 | Status: SHIPPED | OUTPATIENT
Start: 2021-08-02 | End: 2022-01-31 | Stop reason: SDUPTHER

## 2021-08-05 ENCOUNTER — TELEPHONE (OUTPATIENT)
Dept: FAMILY MEDICINE CLINIC | Facility: CLINIC | Age: 66
End: 2021-08-05

## 2021-08-05 NOTE — TELEPHONE ENCOUNTER
Caller: Inez Ladd    Relationship: Self    Best call back number: [phone]    What medication are you requesting: ANTI-FUNGAL MEDICATION    What are your current symptoms: TOE FUNGUS    How long have you been experiencing symptoms: 4 WEEKS AGO    Have you had these symptoms before:    [] Yes  [x] No    Have you been treated for these symptoms before:   [] Yes  [x] No    If a prescription is needed, what is your preferred pharmacy and phone number:    [facility] [phone]  Additional notes:PATIENT TRIED SOME OTC ANTI-FUNGAL AND IT ISN'T WORKING SO WOULD LIKE SOMETHING PRESCRIBED.

## 2021-08-09 DIAGNOSIS — B35.3 TINEA PEDIS OF BOTH FEET: Primary | ICD-10-CM

## 2021-08-09 RX ORDER — FLUCONAZOLE 150 MG/1
TABLET ORAL
Qty: 6 TABLET | Refills: 0 | Status: SHIPPED | OUTPATIENT
Start: 2021-08-09 | End: 2021-11-11

## 2021-11-11 ENCOUNTER — OFFICE VISIT (OUTPATIENT)
Dept: FAMILY MEDICINE CLINIC | Facility: CLINIC | Age: 66
End: 2021-11-11

## 2021-11-11 VITALS
TEMPERATURE: 97.3 F | OXYGEN SATURATION: 97 % | HEIGHT: 69 IN | BODY MASS INDEX: 26.07 KG/M2 | DIASTOLIC BLOOD PRESSURE: 74 MMHG | HEART RATE: 89 BPM | SYSTOLIC BLOOD PRESSURE: 138 MMHG | WEIGHT: 176 LBS | RESPIRATION RATE: 16 BRPM

## 2021-11-11 DIAGNOSIS — J01.10 ACUTE NON-RECURRENT FRONTAL SINUSITIS: Primary | ICD-10-CM

## 2021-11-11 DIAGNOSIS — H61.22 IMPACTED CERUMEN OF LEFT EAR: ICD-10-CM

## 2021-11-11 PROCEDURE — U0003 INFECTIOUS AGENT DETECTION BY NUCLEIC ACID (DNA OR RNA); SEVERE ACUTE RESPIRATORY SYNDROME CORONAVIRUS 2 (SARS-COV-2) (CORONAVIRUS DISEASE [COVID-19]), AMPLIFIED PROBE TECHNIQUE, MAKING USE OF HIGH THROUGHPUT TECHNOLOGIES AS DESCRIBED BY CMS-2020-01-R: HCPCS | Performed by: FAMILY MEDICINE

## 2021-11-11 PROCEDURE — 69210 REMOVE IMPACTED EAR WAX UNI: CPT | Performed by: FAMILY MEDICINE

## 2021-11-11 PROCEDURE — 99214 OFFICE O/P EST MOD 30 MIN: CPT | Performed by: FAMILY MEDICINE

## 2021-11-11 RX ORDER — AMOXICILLIN AND CLAVULANATE POTASSIUM 875; 125 MG/1; MG/1
1 TABLET, FILM COATED ORAL 2 TIMES DAILY
Qty: 20 TABLET | Refills: 0 | Status: SHIPPED | OUTPATIENT
Start: 2021-11-11 | End: 2021-11-23

## 2021-11-11 NOTE — PROGRESS NOTES
Follow Up Office Visit      Patient Name: Inez Ladd  : 1955   MRN: [id number]     Chief Complaint:    Chief Complaint   Patient presents with   • Nasal Congestion     runy nose. losing her voice. covid neg (rapid)       History of Present Illness: Inez Ladd is a 66 y.o. female who is here today to follow up with runny nose, stuffiness, and losing her voice.  Patient had rapid test from work today is negative for Covid.  They we discussed retesting her Covid status because rapid test are not great.  We also discussed her symptoms which include sinus pressure.  Patient has a history of sinusitis.  This started within the last day so we discussed how to treat this in the coming days.  Patient was advised to  Mucinex DM cold and sinus.  She can use ibuprofen as needed.    We did discuss using antibiotics if the symptoms do not improve over the next 3 to 4 days.    Patient has left cerumen impaction.      Review of symptoms was positive for some infection and runny nose      Physical exam: Bilateral ears were examined and left ear has cerumen impaction.  After cleaning out left ear both today membranes were normal without any bulging or redness.  Her heart exam and lung exam was normal.  Patient's HEENT exam showed sinus tenderness with palpation of maxillary and frontal sinuses.  Patient did not have any lymphadenopathy.      Subjective        I have reviewed and the following portions of the patient's history were updated as appropriate: past family history, past medical history, past social history, past surgical history and problem list.    Medications:     Current Outpatient Medications:   •  acetaminophen (TYLENOL) 500 MG tablet, Take 500 mg by mouth Every 6 (Six) Hours As Needed for Mild Pain ., Disp: , Rfl:   •  amLODIPine (NORVASC) 5 MG tablet, TAKE 1 TABLET BY MOUTH EVERY DAY, Disp: 90 tablet, Rfl: 1  •  Aspirin-Caffeine (DEMOND BACK & BODY PO), Take 1 tablet by mouth Every Other Day.,  "Disp: , Rfl:   •  estradiol (ESTRACE VAGINAL) 0.1 MG/GM vaginal cream, Insert 1 gm intravaginally 3 times each week for 3 weeks and then weekly thereafter, Disp: 1 each, Rfl: 3  •  Misc. Devices (Ring Pessary/Support) misc, 1 each Daily. #6 ring with support, Disp: 1 each, Rfl: 0  •  amoxicillin-clavulanate (Augmentin) 875-125 MG per tablet, Take 1 tablet by mouth 2 (Two) Times a Day., Disp: 20 tablet, Rfl: 0    Allergies:   No Known Allergies    Objective     Physical Exam: Please see HPI for physical exam  Vital Signs:   Vitals:    11/11/21 1032   BP: 138/74   Pulse: 89   Resp: 16   Temp: 97.3 °F (36.3 °C)   SpO2: 97%   Weight: 79.8 kg (176 lb)   Height: 175.3 cm (69\")     Body mass index is 25.99 kg/m².          Assessment / Plan      Assessment/Plan:   Diagnoses and all orders for this visit:    1. Acute non-recurrent frontal sinusitis (Primary)  -     amoxicillin-clavulanate (Augmentin) 875-125 MG per tablet; Take 1 tablet by mouth 2 (Two) Times a Day.  Dispense: 20 tablet; Refill: 0  -     COVID-19,[facility] ROUTINE, NP/OP SWAB IN TRANSPORT MEDIA OR ESWAB 72 HR TAT - Swab, Nasopharynx; Future  -     COVID-19,[facility] ROUTINE, NP/OP SWAB IN TRANSPORT MEDIA OR ESWAB 72 HR TAT - Swab, Nasopharynx    2. Impacted cerumen of left ear    Recommend holding off on Augmentin for 2 to 3 days until symptoms worsen.  Patient can started if symptoms do not improve.  Recommend Mucinex DM cold and sinus and ibuprofen as needed.  We will test her for Covid today.    Ear Cerumen Removal    Date/Time: 11/11/2021 11:05 AM  Performed by: Curt Sewell DO  Authorized by: Curt Sewell DO   Consent: Verbal consent obtained. Written consent not obtained.  Risks and benefits: risks, benefits and alternatives were discussed  Consent given by: patient  Patient understanding: patient states understanding of the procedure being performed  Patient consent: the patient's understanding of the procedure matches consent given  Patient identity " confirmed: verbally with patient    Anesthesia:  Local Anesthetic: none  Location details: left ear  Patient tolerance: patient tolerated the procedure well with no immediate complications  Procedure type: instrumentation, curette   Sedation:  Patient sedated: no                Follow Up:   Return if symptoms worsen or fail to improve.    Curt Sewell DO  [facility] [place]

## 2021-11-12 ENCOUNTER — TELEPHONE (OUTPATIENT)
Dept: FAMILY MEDICINE CLINIC | Facility: CLINIC | Age: 66
End: 2021-11-12

## 2021-11-12 LAB
LABCORP SARS-COV-2, NAA 2 DAY TAT: NORMAL
SARS-COV-2 RNA RESP QL NAA+PROBE: NOT DETECTED

## 2021-11-12 NOTE — TELEPHONE ENCOUNTER
----- Message from Curt Sewell DO sent at 11/12/2021  2:22 PM EST -----  Let patient know she was Covid negative

## 2021-11-23 ENCOUNTER — OFFICE VISIT (OUTPATIENT)
Dept: FAMILY MEDICINE CLINIC | Facility: CLINIC | Age: 66
End: 2021-11-23

## 2021-11-23 VITALS
SYSTOLIC BLOOD PRESSURE: 138 MMHG | HEIGHT: 69 IN | DIASTOLIC BLOOD PRESSURE: 68 MMHG | BODY MASS INDEX: 27.02 KG/M2 | RESPIRATION RATE: 14 BRPM | TEMPERATURE: 97.8 F | OXYGEN SATURATION: 99 % | WEIGHT: 182.4 LBS | HEART RATE: 90 BPM

## 2021-11-23 DIAGNOSIS — Z78.0 ENCOUNTER FOR OSTEOPOROSIS SCREENING IN ASYMPTOMATIC POSTMENOPAUSAL PATIENT: ICD-10-CM

## 2021-11-23 DIAGNOSIS — Z00.00 MEDICARE ANNUAL WELLNESS VISIT, SUBSEQUENT: Primary | ICD-10-CM

## 2021-11-23 DIAGNOSIS — Z13.820 ENCOUNTER FOR OSTEOPOROSIS SCREENING IN ASYMPTOMATIC POSTMENOPAUSAL PATIENT: ICD-10-CM

## 2021-11-23 DIAGNOSIS — Z12.11 ENCOUNTER FOR SCREENING FOR MALIGNANT NEOPLASM OF COLON: ICD-10-CM

## 2021-11-23 DIAGNOSIS — Z12.31 ENCOUNTER FOR SCREENING MAMMOGRAM FOR MALIGNANT NEOPLASM OF BREAST: ICD-10-CM

## 2021-11-23 DIAGNOSIS — I10 PRIMARY HYPERTENSION: ICD-10-CM

## 2021-11-23 PROCEDURE — 1170F FXNL STATUS ASSESSED: CPT | Performed by: FAMILY MEDICINE

## 2021-11-23 PROCEDURE — 1159F MED LIST DOCD IN RCRD: CPT | Performed by: FAMILY MEDICINE

## 2021-11-23 PROCEDURE — 1126F AMNT PAIN NOTED NONE PRSNT: CPT | Performed by: FAMILY MEDICINE

## 2021-11-23 PROCEDURE — G0439 PPPS, SUBSEQ VISIT: HCPCS | Performed by: FAMILY MEDICINE

## 2021-11-23 PROCEDURE — 99213 OFFICE O/P EST LOW 20 MIN: CPT | Performed by: FAMILY MEDICINE

## 2021-11-23 PROCEDURE — 80048 BASIC METABOLIC PNL TOTAL CA: CPT | Performed by: FAMILY MEDICINE

## 2021-11-23 RX ORDER — IBUPROFEN 400 MG/1
400 TABLET ORAL EVERY 6 HOURS PRN
COMMUNITY
End: 2022-02-10 | Stop reason: ALTCHOICE

## 2021-11-23 NOTE — PROGRESS NOTES
The ABCs of the Annual Wellness Visit  Subsequent Medicare Wellness Visit    Chief Complaint   Patient presents with   • Medicare Wellness-subsequent      Subjective    History of Present Illness:  Inez Ladd is a 66 y.o. female who presents for a Subsequent Medicare Wellness Visit.      Toe nail discoloration-patient has some discoloration that is brown color on her toenails.  Patient is wondering if she continues cream that she bought over-the-counter to treat it.    BP-patient's blood pressure is well controlled today.  She is was started on amlodipine and we need to reevaluate her kidney function since it has not been checked this year.  She is on amlodipine it looks like Dr. Culp started sometime in the distant past.  She brought her blood pressure log today and it does look good.  Patient's blood pressure staying around 140 on the systolic range.  This patient blood pressure range is 150/90 given her lack of past medical history.     covid booster needed    Prolapsed bladder-patient was told that she can call back for a second opinion on prolapsed bladder whenever she feels the need.    insomnia-patient is adverse to starting medications but we did discuss maybe trying melatonin in the future.      The following portions of the patient's history were reviewed and   updated as appropriate: allergies, current medications, past family history, past medical history, past social history, past surgical history and problem list.    Compared to one year ago, the patient feels her physical   health is the same.    Compared to one year ago, the patient feels her mental   health is the same.    Recent Hospitalizations:  She was not admitted to the hospital during the last year.       Current Medical Providers:  Patient Care Team:  Curt Sewell DO as PCP - General (Family Medicine)    Outpatient Medications Prior to Visit   Medication Sig Dispense Refill   • acetaminophen (TYLENOL) 500 MG tablet Take 500 mg by mouth  Every 6 (Six) Hours As Needed for Mild Pain .     • amLODIPine (NORVASC) 5 MG tablet TAKE 1 TABLET BY MOUTH EVERY DAY 90 tablet 1   • estradiol (ESTRACE VAGINAL) 0.1 MG/GM vaginal cream Insert 1 gm intravaginally 3 times each week for 3 weeks and then weekly thereafter 1 each 3   • ibuprofen (ADVIL,MOTRIN) 400 MG tablet Take 400 mg by mouth Every 6 (Six) Hours As Needed for Mild Pain . Morning and evening     • Misc. Devices (Ring Pessary/Support) misc 1 each Daily. #6 ring with support 1 each 0   • amoxicillin-clavulanate (Augmentin) 875-125 MG per tablet Take 1 tablet by mouth 2 (Two) Times a Day. 20 tablet 0   • Aspirin-Caffeine (DEMOND BACK & BODY PO) Take 1 tablet by mouth Every Other Day.       No facility-administered medications prior to visit.       No opioid medication identified on active medication list. I have reviewed chart for other potential  high risk medication/s and harmful drug interactions in the elderly.          Aspirin is not on active medication list.  Aspirin use is not indicated based on review of current medical condition/s. Risk of harm outweighs potential benefits.  .    Patient Active Problem List   Diagnosis   • Calculus of parotid gland duct   • Calculus of parotid gland duct   • Menopause present   • Essential hypertension   • Encounter for annual routine gynecological examination   • Urge incontinence   • Cystocele with uterine prolapse   • Fitting and adjustment of pessary   • Unspecified abnormal cytological findings in specimens from cervix uteri    • Impacted cerumen of left ear     Advance Care Planning  Advance Directive is not on file.  ACP discussion was held with the patient during this visit. Patient does not have an advance directive, information provided.    Review of Systems   HENT: Positive for hearing loss.    Respiratory: Negative for chest tightness and shortness of breath.    Cardiovascular: Negative for chest pain and leg swelling.   Psychiatric/Behavioral:  "Positive for sleep disturbance. The patient is nervous/anxious.         Objective    Vitals:    21 1548   BP: 138/68   Pulse: 90   Resp: 14   Temp: 97.8 °F (36.6 °C)   TempSrc: Temporal   SpO2: 99%   Weight: 82.7 kg (182 lb 6.4 oz)   Height: 175.3 cm (69\")   PainSc: 0-No pain     BMI Readings from Last 1 Encounters:   21 26.94 kg/m²   BMI is above normal parameters. Recommendations include: exercise counseling    Does the patient have evidence of cognitive impairment? No    Physical Exam  Vitals reviewed.   HENT:      Head: Normocephalic.      Nose: No congestion.      Mouth/Throat:      Mouth: Mucous membranes are moist.   Eyes:      Pupils: Pupils are equal, round, and reactive to light.   Cardiovascular:      Rate and Rhythm: Normal rate and regular rhythm.   Pulmonary:      Effort: Pulmonary effort is normal. No respiratory distress.      Breath sounds: Normal breath sounds.   Abdominal:      General: Abdomen is flat.   Musculoskeletal:         General: No swelling or tenderness.      Cervical back: Normal range of motion.   Skin:     General: Skin is warm.   Neurological:      Mental Status: She is alert. Mental status is at baseline.   Psychiatric:         Mood and Affect: Mood normal.                 HEALTH RISK ASSESSMENT    Smoking Status:  Social History     Tobacco Use   Smoking Status Former Smoker   • Packs/day: 0.25   • Years: 14.00   • Pack years: 3.50   • Types: Cigarettes   • Quit date: 1998   • Years since quittin.9   Smokeless Tobacco Never Used     Alcohol Consumption:  Social History     Substance and Sexual Activity   Alcohol Use Yes    Comment: Occasional alcohol use      Fall Risk Screen:    AMARJITADI Fall Risk Assessment was completed, and patient is at MODERATE risk for falls. Assessment completed on:2021    Depression Screening:  PHQ-2/PHQ-9 Depression Screening 2021   Little interest or pleasure in doing things 0   Feeling down, depressed, or hopeless 1 "   Total Score 1       Health Habits and Functional and Cognitive Screening:  Functional & Cognitive Status 11/23/2021   Do you have difficulty preparing food and eating? No   Do you have difficulty bathing yourself, getting dressed or grooming yourself? No   Do you have difficulty using the toilet? Yes   Do you have difficulty moving around from place to place? No   Do you have trouble with steps or getting out of a bed or a chair? Yes   Current Diet Well Balanced Diet   Dental Exam Up to date   Eye Exam Up to date   Exercise (times per week) 7 times per week   Current Exercises Include Walking   Current Exercise Activities Include -   Do you need help using the phone?  No   Are you deaf or do you have serious difficulty hearing?  Yes   Do you need help with transportation? No   Do you need help shopping? No   Do you need help preparing meals?  No   Do you need help with housework?  No   Do you need help with laundry? No   Do you need help taking your medications? No   Do you need help managing money? No   Do you ever drive or ride in a car without wearing a seat belt? No   Have you felt unusual stress, anger or loneliness in the last month? No   Who do you live with? Alone   If you need help, do you have trouble finding someone available to you? No   Have you been bothered in the last four weeks by sexual problems? No   Do you have difficulty concentrating, remembering or making decisions? No       Age-appropriate Screening Schedule:  Refer to the list below for future screening recommendations based on patient's age, sex and/or medical conditions. Orders for these recommended tests are listed in the plan section. The patient has been provided with a written plan.    Health Maintenance   Topic Date Due   • DXA SCAN  Never done   • INFLUENZA VACCINE  Never done   • TDAP/TD VACCINES (1 - Tdap) 01/04/2022 (Originally 2/18/1974)   • ZOSTER VACCINE (1 of 2) 01/07/2022 (Originally 2/18/2005)   • MAMMOGRAM  04/22/2023   •  PAP SMEAR  03/19/2024              Assessment/Plan   CMS Preventative Services Quick Reference  Risk Factors Identified During Encounter  Inadequate Social Support, Isolation, Loneliness, Lack of Transportation, Financial Difficulties, or Caregiver Stress   Inactivity/Sedentary  The above risks/problems have been discussed with the patient.  Follow up actions/plans if indicated are seen below in the Assessment/Plan Section.  Pertinent information has been shared with the patient in the After Visit Summary.    Diagnoses and all orders for this visit:    1. Medicare annual wellness visit, subsequent (Primary)    2. Encounter for screening for malignant neoplasm of colon    3. Encounter for osteoporosis screening in asymptomatic postmenopausal patient  -     DEXA Bone Density Axial; Future    4. Encounter for screening mammogram for malignant neoplasm of breast    5. Primary hypertension  -     Basic Metabolic Panel    Patient blood pressure looks great.  We will continue amlodipine 5 mg although patient did ask about increasing the medication today.  Since we started this newer medication we need to recheck her kidney function.  Continue amlodipine 5 mg.    Patient can call in for refills.  Patient needs to follow-up yearly    Follow Up:   Return if symptoms worsen or fail to improve.     An After Visit Summary and PPPS were made available to the patient.

## 2021-11-24 LAB
ANION GAP SERPL CALCULATED.3IONS-SCNC: 7.7 MMOL/L (ref 5–15)
BUN SERPL-MCNC: 14 MG/DL (ref 8–23)
BUN/CREAT SERPL: 20.9 (ref 7–25)
CALCIUM SPEC-SCNC: 10 MG/DL (ref 8.6–10.5)
CHLORIDE SERPL-SCNC: 102 MMOL/L (ref 98–107)
CO2 SERPL-SCNC: 29.3 MMOL/L (ref 22–29)
CREAT SERPL-MCNC: 0.67 MG/DL (ref 0.57–1)
GFR SERPL CREATININE-BSD FRML MDRD: 88 ML/MIN/1.73
GLUCOSE SERPL-MCNC: 81 MG/DL (ref 65–99)
POTASSIUM SERPL-SCNC: 3.9 MMOL/L (ref 3.5–5.2)
SODIUM SERPL-SCNC: 139 MMOL/L (ref 136–145)

## 2021-12-13 NOTE — TELEPHONE ENCOUNTER
Caller: Julee Inez    Relationship: Self    Best call back number: [phone]    What medications are you currently taking:   Current Outpatient Medications on File Prior to Visit   Medication Sig Dispense Refill   • amLODIPine (NORVASC) 5 MG tablet TAKE 1 TABLET BY MOUTH EVERY DAY 90 tablet 0   • Arnuity Ellipta 100 MCG/ACT aerosol powder  INHALE 1 PUFF BY MOUTH EVERY DAY 30 each 5   • meloxicam (MOBIC) 15 MG tablet Take 1 tablet by mouth Daily. 7 tablet 1   • tiZANidine (ZANAFLEX) 4 MG tablet Take one tablet twice a day for muscle relaxation 14 tablet 1     No current facility-administered medications on file prior to visit.         When did you start taking these medications:     Which medication are you concerned about: amLODIPine (NORVASC) 5 MG tablet    Who prescribed you this medication: HEIKE    What are your concerns: Patient dropped medication down drain and has 10 left.  Patient is asking that Dr. Culp send her a new prescription to her pharmacy.    How long have you been taking these medications:    How long have you had these concerns:           
Let patient know that a new prescription of 90 tablets was sent into her pharmacy.  
Pt notified.  BBailey, CMA  
Not applicable as gestational age is greater than or equal to 34 weeks.

## 2022-01-03 ENCOUNTER — OFFICE VISIT (OUTPATIENT)
Dept: FAMILY MEDICINE CLINIC | Facility: CLINIC | Age: 67
End: 2022-01-03

## 2022-01-03 ENCOUNTER — HOSPITAL ENCOUNTER (OUTPATIENT)
Dept: GENERAL RADIOLOGY | Facility: HOSPITAL | Age: 67
Discharge: HOME | End: 2022-01-03
Admitting: NURSE PRACTITIONER

## 2022-01-03 VITALS
HEART RATE: 90 BPM | HEIGHT: 69 IN | OXYGEN SATURATION: 99 % | WEIGHT: 182 LBS | TEMPERATURE: 98.7 F | RESPIRATION RATE: 18 BRPM | BODY MASS INDEX: 26.96 KG/M2 | SYSTOLIC BLOOD PRESSURE: 132 MMHG | DIASTOLIC BLOOD PRESSURE: 72 MMHG

## 2022-01-03 DIAGNOSIS — M25.561 ACUTE PAIN OF RIGHT KNEE: ICD-10-CM

## 2022-01-03 DIAGNOSIS — M25.561 ACUTE PAIN OF RIGHT KNEE: Primary | ICD-10-CM

## 2022-01-03 PROCEDURE — 99213 OFFICE O/P EST LOW 20 MIN: CPT | Performed by: NURSE PRACTITIONER

## 2022-01-03 PROCEDURE — 73560 X-RAY EXAM OF KNEE 1 OR 2: CPT

## 2022-01-03 RX ORDER — METHYLPREDNISOLONE 4 MG/1
TABLET ORAL
Qty: 21 EACH | Refills: 0 | Status: SHIPPED | OUTPATIENT
Start: 2022-01-03 | End: 2022-01-11

## 2022-01-03 NOTE — PROGRESS NOTES
"Chief Complaint  pulled miscle in right leg    Subjective          Inez Ladd presents to [facility]  Patient presents to office with complaints of right knee pain that started after playing baseball in back yard with family about 5 days ago. States right knee had mild pain in past but denies past surgeries to this knee. Explains pain has improved some and pain is located in posterior right knee and slightly above it with weight bearing.    Knee Pain   The incident occurred 5 to 7 days ago. The incident occurred in the yard. There was no injury mechanism (States she was walking fast playing softball with family but denies injury. States she over exerted herself). The pain is present in the right knee. The quality of the pain is described as aching and cramping. The pain is at a severity of 7/10. The pain is moderate. The pain has been improving since onset. Pertinent negatives include no loss of motion, numbness or tingling. She reports no foreign bodies present. The symptoms are aggravated by weight bearing and movement. She has tried elevation, immobilization, NSAIDs, non-weight bearing and rest (crutches) for the symptoms. The treatment provided mild relief.       Objective   Vital Signs:   /72   Pulse 90   Temp 98.7 °F (37.1 °C)   Resp 18   Ht 175.3 cm (69\")   Wt 82.6 kg (182 lb)   SpO2 99%   BMI 26.88 kg/m²     Physical Exam  Vitals and nursing note reviewed.   Constitutional:       General: She is not in acute distress.     Appearance: Normal appearance.   HENT:      Head: Normocephalic.      Right Ear: External ear normal.      Left Ear: External ear normal.      Nose: Nose normal.   Eyes:      Extraocular Movements: Extraocular movements intact.      Conjunctiva/sclera: Conjunctivae normal.      Pupils: Pupils are equal, round, and reactive to light.   Cardiovascular:      Rate and Rhythm: Normal rate and regular rhythm.      Heart sounds: Normal heart sounds. "   Pulmonary:      Effort: Pulmonary effort is normal.      Breath sounds: Normal breath sounds.   Musculoskeletal:      Right knee: Crepitus present. No swelling, erythema or lacerations. Tenderness present. Normal alignment.      Right lower leg: No edema.      Left lower leg: No edema.   Skin:     General: Skin is warm and dry.      Findings: No bruising, erythema, lesion or rash.   Neurological:      Mental Status: She is alert and oriented to person, place, and time.   Psychiatric:         Mood and Affect: Mood normal.         Behavior: Behavior normal.         Thought Content: Thought content normal.         Judgment: Judgment normal.        Result Review :     Common labs    Common Labsle 11/23/21   Glucose 81   BUN 14   Creatinine 0.67   eGFR Non African Am 88   Sodium 139   Potassium 3.9   Chloride 102   Calcium 10.0                    Assessment and Plan    Diagnoses and all orders for this visit:    1. Acute pain of right knee (Primary)  Assessment & Plan:  Start medrol dose pack as directed  Heat/ice PRN as directed  X-ray right knee today  Consider PT or orthopedic referral  Start Diclofenac gel to right knee as directed  RTO or call PRN  Will notify patient of imaging results upon return and review    Orders:  -     methylPREDNISolone (MEDROL) 4 MG dose pack; Take as directed on package instructions.  Dispense: 21 each; Refill: 0  -     Diclofenac Sodium (VOLTAREN) 1 % gel gel; Apply 4 g topically to the appropriate area as directed 4 (Four) Times a Day As Needed (Pain in right knee).  Dispense: 150 g; Refill: 1  -     XR Knee 3 View Right; Future      Follow Up   Return if symptoms worsen or fail to improve.  Patient was given instructions and counseling regarding her condition or for health maintenance advice. Please see specific information pulled into the AVS if appropriate.

## 2022-01-03 NOTE — ASSESSMENT & PLAN NOTE
Start medrol dose pack as directed  Heat/ice PRN as directed  X-ray right knee today  Consider PT or orthopedic referral  Start Diclofenac gel to right knee as directed  RTO or call PRN  Will notify patient of imaging results upon return and review

## 2022-01-04 DIAGNOSIS — M25.561 ACUTE PAIN OF RIGHT KNEE: ICD-10-CM

## 2022-01-04 DIAGNOSIS — M17.11 OSTEOARTHRITIS OF RIGHT KNEE, UNSPECIFIED OSTEOARTHRITIS TYPE: ICD-10-CM

## 2022-01-04 DIAGNOSIS — M17.12 OSTEOARTHRITIS OF LEFT KNEE, UNSPECIFIED OSTEOARTHRITIS TYPE: Primary | ICD-10-CM

## 2022-01-11 ENCOUNTER — OFFICE VISIT (OUTPATIENT)
Dept: ORTHOPEDIC SURGERY | Facility: CLINIC | Age: 67
End: 2022-01-11

## 2022-01-11 VITALS
WEIGHT: 182 LBS | HEIGHT: 69 IN | DIASTOLIC BLOOD PRESSURE: 70 MMHG | SYSTOLIC BLOOD PRESSURE: 162 MMHG | BODY MASS INDEX: 26.96 KG/M2

## 2022-01-11 DIAGNOSIS — M17.11 PRIMARY OSTEOARTHRITIS OF RIGHT KNEE: Primary | ICD-10-CM

## 2022-01-11 PROCEDURE — 99204 OFFICE O/P NEW MOD 45 MIN: CPT | Performed by: ORTHOPAEDIC SURGERY

## 2022-01-11 PROCEDURE — 20610 DRAIN/INJ JOINT/BURSA W/O US: CPT | Performed by: ORTHOPAEDIC SURGERY

## 2022-01-11 RX ORDER — LIDOCAINE HYDROCHLORIDE 10 MG/ML
3 INJECTION, SOLUTION EPIDURAL; INFILTRATION; INTRACAUDAL; PERINEURAL
Status: COMPLETED | OUTPATIENT
Start: 2022-01-11 | End: 2022-01-11

## 2022-01-11 RX ORDER — TRIAMCINOLONE ACETONIDE 40 MG/ML
80 INJECTION, SUSPENSION INTRA-ARTICULAR; INTRAMUSCULAR
Status: COMPLETED | OUTPATIENT
Start: 2022-01-11 | End: 2022-01-11

## 2022-01-11 RX ORDER — BUPIVACAINE HYDROCHLORIDE 2.5 MG/ML
3 INJECTION, SOLUTION EPIDURAL; INFILTRATION; INTRACAUDAL
Status: COMPLETED | OUTPATIENT
Start: 2022-01-11 | End: 2022-01-11

## 2022-01-11 RX ADMIN — LIDOCAINE HYDROCHLORIDE 3 ML: 10 INJECTION, SOLUTION EPIDURAL; INFILTRATION; INTRACAUDAL; PERINEURAL at 08:39

## 2022-01-11 RX ADMIN — TRIAMCINOLONE ACETONIDE 80 MG: 40 INJECTION, SUSPENSION INTRA-ARTICULAR; INTRAMUSCULAR at 08:39

## 2022-01-11 RX ADMIN — BUPIVACAINE HYDROCHLORIDE 3 ML: 2.5 INJECTION, SOLUTION EPIDURAL; INFILTRATION; INTRACAUDAL at 08:39

## 2022-01-11 NOTE — PROGRESS NOTES
Orthopaedic Clinic Note: Knee New Patient    Chief Complaint   Patient presents with   • Right Knee - Pain        HPI  Consult from: LUCILA Crespo*    Inez Ladd is a 66 y.o. female who presents with right knee pain for 2 week(s). Onset atraumatic and gradual in nature. Pain is localized to the popliteal region and is a 6/10 on the pain scale. Pain is described as aching. Associated symptoms include pain and stiffness. The pain is worse with walking; resting, sitting, ice and pain medication and/or NSAID make it better. Previous treatments have included: NSAIDS and oral steroids since symptom onset. Although some transient relief was reported with these interventions, these conservative measures have failed and symptoms have persisted. The patient is limited in daily activities and has had a significant decrease in quality of life as a result. She denies fevers, chills, or constitutional symptoms.    I have reviewed the following portions of the patient's history:History of Present Illness    Past Medical History:   Diagnosis Date   • Acute cystitis    • Acute pharyngitis    • Body aches    • Cancer (HCC)     skin    • Cellulitis    • Dysuria    • H/O bladder infections     2 SINCE AUGUST, urinary tract infection   • History of prolapse of bladder      Bladder disorder   • Hypertension    • Impacted cerumen     Impacted cerumen, unspecified ear    • Infected insect bite of forearm    • Menopause    • Prophylactic immunotherapy    • Separation of AC joint 2006    Right Shoulder      Past Surgical History:   Procedure Laterality Date   • CARPAL TUNNEL RELEASE     • KNEE SURGERY        Family History   Problem Relation Age of Onset   • Coronary artery disease Other    • Coronary artery disease Mother    • Hypertension Mother    • Dementia Mother    • No Known Problems Father    • Heart disease Maternal Grandmother    • Breast cancer Neg Hx    • Ovarian cancer Neg Hx      Social History     Socioeconomic  History   • Marital status:    Tobacco Use   • Smoking status: Former Smoker     Packs/day: 0.25     Years: 14.00     Pack years: 3.50     Types: Cigarettes     Quit date: 1998     Years since quittin.0   • Smokeless tobacco: Never Used   Substance and Sexual Activity   • Alcohol use: Yes     Comment: Occasional alcohol use    • Drug use: No   • Sexual activity: Not Currently     Birth control/protection: None      Current Outpatient Medications on File Prior to Visit   Medication Sig Dispense Refill   • acetaminophen (TYLENOL) 500 MG tablet Take 500 mg by mouth Every 6 (Six) Hours As Needed for Mild Pain .     • amLODIPine (NORVASC) 5 MG tablet TAKE 1 TABLET BY MOUTH EVERY DAY 90 tablet 1   • Diclofenac Sodium (VOLTAREN) 1 % gel gel Apply 4 g topically to the appropriate area as directed 4 (Four) Times a Day As Needed (Pain in right knee). 150 g 1   • estradiol (ESTRACE VAGINAL) 0.1 MG/GM vaginal cream Insert 1 gm intravaginally 3 times each week for 3 weeks and then weekly thereafter 1 each 3   • ibuprofen (ADVIL,MOTRIN) 400 MG tablet Take 400 mg by mouth Every 6 (Six) Hours As Needed for Mild Pain . Morning and evening     • Misc. Devices (Ring Pessary/Support) misc 1 each Daily. #6 ring with support 1 each 0   • [DISCONTINUED] methylPREDNISolone (MEDROL) 4 MG dose pack Take as directed on package instructions. 21 each 0     No current facility-administered medications on file prior to visit.      No Known Allergies     Review of Systems   Constitutional: Negative.    HENT: Positive for sinus pressure.    Eyes: Negative.    Respiratory: Negative.    Cardiovascular: Negative.    Gastrointestinal: Negative.    Endocrine: Negative.    Genitourinary: Positive for urgency.   Musculoskeletal: Positive for arthralgias.   Skin: Negative.    Allergic/Immunologic: Negative.    Neurological: Positive for headaches.   Hematological: Negative.    Psychiatric/Behavioral: Negative.         The patient's Review  "of Systems was personally reviewed and confirmed as accurate.    The following portions of the patient's history were reviewed and updated as appropriate: allergies, current medications, past family history, past medical history, past social history, past surgical history and problem list.    Physical Exam  Blood pressure 162/70, height 175.3 cm (69.02\"), weight 82.6 kg (182 lb), not currently breastfeeding.    Body mass index is 26.86 kg/m².    GENERAL APPEARANCE: awake, alert & oriented x 3, in no acute distress and well developed, well nourished  PSYCH: normal affect  LUNGS:  breathing nonlabored  EYES: sclera anicteric  CARDIOVASCULAR: palpable dorsalis pedis, palpable posterior tibial bilaterally. Capillary refill less than 2 seconds  EXTREMITIES: no clubbing, cyanosis  GAIT:  Antalgic            Right Lower Extremity Exam:   ----------  Hip Exam  ----------  FLEXION CONTRACTURE: None  FLEXION: 110 degrees  INTERNAL ROTATION: 20 degrees at 90 degrees of flexion   EXTERNAL ROTATION: 40 degrees at 90 degrees of flexion    PAIN WITH HIP MOTION: no  ----------  Knee Exam  ----------  ALIGNMENT: neutral    RANGE OF MOTION:  Decreased (5 - 115 degrees) with no extensor lag  LIGAMENTOUS STABILITY:   stable to varus and valgus stress at terminal extension and 30 degrees without any evidence of laxity     STRENGTH:  4/5 knee flexion, extension. 5/5 ankle dorsiflexion and plantarflexion.     PAIN WITH PALPATION: popliteal region  KNEE EFFUSION: yes, trace effusion with large palpable Baker's cyst in the popliteal fossa  PAIN WITH KNEE ROM: yes, posteriorly  PATELLAR CREPITUS: yes, asymptomatic  SPECIAL EXAM FINDINGS:  none    REFLEXES:  PATELLAR 2+/4  ACHILLES 2+/4    CLONUS: no  STRAIGHT LEG TEST:   negative    SENSATION TO LIGHT TOUCH:  DEEP PERONEAL/SUPERFICIAL PERONEAL/SURAL/SAPHENOUS/TIBIAL:   intact    EDEMA:  no  ERYTHEMA:  no  WOUNDS/INCISIONS:  no        Left Lower Extremity Exam:   ----------  Hip " Exam  ----------  FLEXION CONTRACTURE: None  FLEXION: 110 degrees  INTERNAL ROTATION: 20 degrees at 90 degrees of flexion   EXTERNAL ROTATION: 40 degrees at 90 degrees of flexion    PAIN WITH HIP MOTION: no  ----------  Knee Exam  ----------  ALIGNMENT: neutral, no varus or valgus deformity     RANGE OF MOTION:  Normal (0-120 degrees) with no extensor lag or flexion contracture  LIGAMENTOUS STABILITY:   stable to varus and valgus stress at terminal extension and 30 degrees without any evidence of laxity     STRENGTH:  5/5 knee flexion, extension. 5/5 ankle dorsiflexion and plantarflexion.     PAIN WITH PALPATION: denies tenderness to palpation about the knee, denies medial or lateral joint line pain  KNEE EFFUSION: no  PAIN WITH KNEE ROM: no  PATELLAR CREPITUS: yes, asymptomatic  SPECIAL EXAM FINDINGS:  Negative patellar compression    REFLEXES:  PATELLAR 2+/4  ACHILLES 2+/4    CLONUS: negative  STRAIGHT LEG TEST:   negative    SENSATION TO LIGHT TOUCH:  DEEP PERONEAL/SUPERFICIAL PERONEAL/SURAL/SAPHENOUS/TIBIAL:   intact    EDEMA:  no  ERYTHEMA:  no  WOUNDS/INCISIONS: no overlying skin problems.    ______________________________________________________________________  ______________________________________________________________________    RADIOGRAPHIC FINDINGS:   Right knee radiographs from 1/3/2022 are personally interpreted.  Radiographs demonstrate moderate tricompartmental osteoarthritis with neutral alignment.  Severe patellofemoral arthritis with near bone-on-bone articulation patellofemoral compartment.      Assessment/Plan:   Diagnosis Plan   1. Primary osteoarthritis of right knee       Patient is having arthritic flareup with associated swelling of the right knee.  This is resulting in hamstring tightness.  I discussed treatment options.  She is agreeable to intra-articular cortisone injection today.  She will follow-up as needed.    Procedure Note:  I discussed with the patient the potential benefits  of performing a therapeutic injection of the right knee as well as potential risks including but not limited to infection, swelling, pain, bleeding, bruising, nerve/vessel damage, skin color changes, transient elevation in blood glucose levels, and fat atrophy. After informed consent and verifying correct patient, procedure site, and type of procedure, the area was prepped with alcohol, ethyl chloride was used to numb the skin. Via the upper lateral approach, 3cc of 1% lidocaine, 3cc of 0.25% bupivicaine and 2 cc of 40mg/ml of Kenalog were injected into the right knee. The patient tolerated the procedure well. There were no complications. A sterile dressing was placed over the injection site.      Bradly Chavez MD  01/11/22  08:38 EST

## 2022-01-11 NOTE — PROGRESS NOTES
Procedure   Large Joint Arthrocentesis: R knee  Date/Time: 1/11/2022 8:39 AM  Consent given by: patient  Site marked: site marked  Timeout: Immediately prior to procedure a time out was called to verify the correct patient, procedure, equipment, support staff and site/side marked as required   Supporting Documentation  Indications: pain   Procedure Details  Location: knee - R knee  Preparation: Patient was prepped and draped in the usual sterile fashion  Needle size: 22 G  Approach: anterolateral  Medications administered: 3 mL bupivacaine (PF) 0.25 %; 3 mL lidocaine PF 1% 1 %; 80 mg triamcinolone acetonide 40 MG/ML  Patient tolerance: patient tolerated the procedure well with no immediate complications

## 2022-01-13 ENCOUNTER — OFFICE VISIT (OUTPATIENT)
Dept: FAMILY MEDICINE CLINIC | Facility: CLINIC | Age: 67
End: 2022-01-13

## 2022-01-13 VITALS
HEIGHT: 69 IN | SYSTOLIC BLOOD PRESSURE: 138 MMHG | DIASTOLIC BLOOD PRESSURE: 78 MMHG | HEART RATE: 83 BPM | WEIGHT: 185 LBS | OXYGEN SATURATION: 99 % | RESPIRATION RATE: 20 BRPM | TEMPERATURE: 98 F | BODY MASS INDEX: 27.4 KG/M2

## 2022-01-13 DIAGNOSIS — F41.8 ANXIETY ABOUT HEALTH: ICD-10-CM

## 2022-01-13 DIAGNOSIS — I10 ESSENTIAL HYPERTENSION: Primary | Chronic | ICD-10-CM

## 2022-01-13 PROCEDURE — 99214 OFFICE O/P EST MOD 30 MIN: CPT | Performed by: NURSE PRACTITIONER

## 2022-01-13 RX ORDER — HYDROXYZINE HYDROCHLORIDE 25 MG/1
25 TABLET, FILM COATED ORAL 2 TIMES DAILY PRN
Qty: 60 TABLET | Refills: 0 | Status: SHIPPED | OUTPATIENT
Start: 2022-01-13 | End: 2022-01-19 | Stop reason: ALTCHOICE

## 2022-01-13 RX ORDER — TIZANIDINE 4 MG/1
TABLET ORAL
COMMUNITY
Start: 2022-01-13 | End: 2022-02-22

## 2022-01-13 NOTE — PROGRESS NOTES
Follow Up Office Note     Patient Name: Inez Ladd  : 1955   MRN: [id number]     Chief Complaint:    Chief Complaint   Patient presents with   • Hypertension       History of Present Illness: Inez Ladd is a 66 y.o. female who presents today with concern about elevated blood pressure since recent cortisone injection in her right knee at Dr. Bradly Chavez's office on 2022. Patient states that her blood pressure went up afterward she felt felt flushed. She states that she took her blood pressure this morning and it was 148/90. She states that due to her elevated blood pressure she took extra amlodipine this morning (half of a 5 mg tablet in addition to her regular 5 mg dose for total of 7.5 mg). She is complaining of a mild headache, however she denies chest pains, palpitations, dizziness or shortness of breath.      Subjective      Review of Systems:   Review of Systems   Constitutional: Negative for activity change, appetite change, chills, diaphoresis, fatigue, fever and unexpected weight change.   Respiratory: Negative for chest tightness and shortness of breath.    Cardiovascular: Negative for chest pain, palpitations and leg swelling.   Gastrointestinal: Negative for nausea.   Neurological: Negative.    Psychiatric/Behavioral: The patient is nervous/anxious.         Past Medical History:   Past Medical History:   Diagnosis Date   • Acute cystitis    • Acute pharyngitis    • Body aches    • Cancer (HCC)     skin    • Cellulitis    • Dysuria    • H/O bladder infections     2 SINCE AUGUST, urinary tract infection   • History of prolapse of bladder      Bladder disorder   • Hypertension    • Impacted cerumen     Impacted cerumen, unspecified ear    • Infected insect bite of forearm    • Menopause    • Prophylactic immunotherapy    • Separation of AC joint 2006    Right Shoulder         Medications:     Current Outpatient Medications:   •  acetaminophen (TYLENOL) 500 MG tablet, Take 500 mg  "by mouth Every 6 (Six) Hours As Needed for Mild Pain ., Disp: , Rfl:   •  amLODIPine (NORVASC) 5 MG tablet, TAKE 1 TABLET BY MOUTH EVERY DAY, Disp: 90 tablet, Rfl: 1  •  estradiol (ESTRACE VAGINAL) 0.1 MG/GM vaginal cream, Insert 1 gm intravaginally 3 times each week for 3 weeks and then weekly thereafter, Disp: 1 each, Rfl: 3  •  ibuprofen (ADVIL,MOTRIN) 400 MG tablet, Take 400 mg by mouth Every 6 (Six) Hours As Needed for Mild Pain . Morning and evening, Disp: , Rfl:   •  Misc. Devices (Ring Pessary/Support) misc, 1 each Daily. #6 ring with support, Disp: 1 each, Rfl: 0  •  Diclofenac Sodium (VOLTAREN) 1 % gel gel, Apply 4 g topically to the appropriate area as directed 4 (Four) Times a Day As Needed (Pain in right knee)., Disp: 150 g, Rfl: 1  •  tiZANidine (ZANAFLEX) 4 MG tablet, , Disp: , Rfl:     Allergies:   No Known Allergies      Objective     Physical Exam:  Vital Signs:   Vitals:    01/13/22 1044 01/13/22 1116   BP: 148/90 138/78   Pulse: 83    Resp: 20    Temp: 98 °F (36.7 °C)    SpO2: 99%    Weight: 83.9 kg (185 lb)    Height: 175.3 cm (69.02\")    PainSc: 0-No pain      Body mass index is 27.3 kg/m².     Physical Exam  Vitals and nursing note reviewed.   Constitutional:       General: She is not in acute distress.     Appearance: Normal appearance. She is well-developed. She is not ill-appearing, toxic-appearing or diaphoretic.   HENT:      Head: Normocephalic and atraumatic.   Cardiovascular:      Rate and Rhythm: Normal rate and regular rhythm.      Heart sounds: Normal heart sounds, S1 normal and S2 normal. No murmur heard.      Pulmonary:      Effort: Pulmonary effort is normal. No respiratory distress.      Breath sounds: Normal breath sounds. No stridor. No wheezing.   Musculoskeletal:      Right lower leg: No edema.      Left lower leg: No edema.   Skin:     General: Skin is warm and dry.   Neurological:      General: No focal deficit present.      Mental Status: She is alert and oriented to " person, place, and time.   Psychiatric:         Mood and Affect: Mood normal.         Behavior: Behavior normal. Behavior is cooperative.         Thought Content: Thought content normal.         Judgment: Judgment normal.         Assessment / Plan      Assessment/Plan:   Diagnoses and all orders for this visit:    1. Essential hypertension (Primary)  Assessment & Plan:  Hypertension is worsening.  Continue current treatment regimen.  Dietary sodium restriction.  Regular aerobic exercise.  Continue current medications.  Ambulatory blood pressure monitoring.  Blood pressure will be reassessed in 2 weeks.  Patient advised to limit her caffeine intake, avoid Sudafed-containing cold products, increase water intake.  Recommend patient discontinue taking ibuprofen.       Follow Up:   PRN and at next scheduled appointment(s) with PCP.    Discussed the nature of the medical condition(s) risks, complications, implications, management, safe and proper use of medications. Encouraged medication compliance, and keeping scheduled follow up appointments with me and any other providers.      RTC if symptoms fail to improve, to ER if symptoms worsen.      STEVE Payne  [facility]

## 2022-01-13 NOTE — PATIENT INSTRUCTIONS
How to Take Your Blood Pressure  Blood pressure is a measurement of how strongly your blood is pressing against the walls of your arteries. Arteries are blood vessels that carry blood from your heart throughout your body. Your health care provider takes your blood pressure at each office visit. You can also take your own blood pressure at home with a blood pressure monitor.  You may need to take your own blood pressure to:  · Confirm a diagnosis of high blood pressure (hypertension).  · Monitor your blood pressure over time.  · Make sure your blood pressure medicine is working.  Supplies needed:  · Blood pressure monitor.  · Dining room chair to sit in.  · Table or desk.  · Small notebook and pencil or pen.  How to prepare  To get the most accurate reading, avoid the following for 30 minutes before you check your blood pressure:  · Drinking caffeine.  · Drinking alcohol.  · Eating.  · Smoking.  · Exercising.  Five minutes before you check your blood pressure:  · Use the bathroom and urinate so that you have an empty bladder.  · Sit quietly in a dining room chair. Do not sit in a soft couch or an armchair. Do not talk.  How to take your blood pressure  To check your blood pressure, follow the instructions in the manual that came with your blood pressure monitor. If you have a digital blood pressure monitor, the instructions may be as follows:  1. Sit up straight in a chair.  2. Place your feet on the floor. Do not cross your ankles or legs.  3. Rest your left arm at the level of your heart on a table or desk or on the arm of a chair.  4. Pull up your shirt sleeve.  5. Wrap the blood pressure cuff around the upper part of your left arm, 1 inch (2.5 cm) above your elbow. It is best to wrap the cuff around bare skin.  6. Fit the cuff snugly around your arm. You should be able to place only one finger between the cuff and your arm.  7. Position the cord so that it rests in the bend of your elbow.  8. Press the power  "button.  9. Sit quietly while the cuff inflates and deflates.  10. Read the digital reading on the monitor screen and write the numbers down (record them) in a notebook.  11. Wait 2-3 minutes, then repeat the steps, starting at step 1.  What does my blood pressure reading mean?  A blood pressure reading consists of a higher number over a lower number. Ideally, your blood pressure should be below 120/80. The first (\"top\") number is called the systolic pressure. It is a measure of the pressure in your arteries as your heart beats. The second (\"bottom\") number is called the diastolic pressure. It is a measure of the pressure in your arteries as the heart relaxes.  Blood pressure is classified into five stages. The following are the stages for adults who do not have a short-term serious illness or a chronic condition. Systolic pressure and diastolic pressure are measured in a unit called mm Hg (millimeters of mercury).   Normal  · Systolic pressure: below 120.  · Diastolic pressure: below 80.  Elevated  · Systolic pressure: 120-129.  · Diastolic pressure: below 80.  Hypertension stage 1  · Systolic pressure: 130-139.  · Diastolic pressure: 80-89.  Hypertension stage 2  · Systolic pressure: 140 or above.  · Diastolic pressure: 90 or above.  You can have elevated blood pressure or hypertension even if only the systolic or only the diastolic number in your reading is higher than normal.  Follow these instructions at home:  · Check your blood pressure as often as recommended by your health care provider.  · Check your blood pressure at the same time every day.  · Take your monitor to the next appointment with your health care provider to make sure that:  ? You are using it correctly.  ? It provides accurate readings.  · Be sure you understand what your goal blood pressure numbers are.  · Tell your health care provider if you are having any side effects from blood pressure medicine.  · Keep all follow-up visits as told by " your health care provider. This is important.  General tips  · Your health care provider can suggest a reliable monitor that will meet your needs. There are several types of home blood pressure monitors.  · Choose a monitor that has an arm cuff. Do not choose a monitor that measures your blood pressure from your wrist or finger.  · Choose a cuff that wraps snugly around your upper arm. You should be able to fit only one finger between your arm and the cuff.  · You can buy a blood pressure monitor at most [employer] or online.  Where to find more information  American Heart Association: www.heart.org  Contact a health care provider if:  · Your blood pressure is consistently high.  Get help right away if:  · Your systolic blood pressure is higher than 180.  · Your diastolic blood pressure is higher than 120.  Summary  · Blood pressure is a measurement of how strongly your blood is pressing against the walls of your arteries.  · A blood pressure reading consists of a higher number over a lower number. Ideally, your blood pressure should be below 120/80.  · Check your blood pressure at the same time every day.  · Avoid caffeine, alcohol, smoking, and exercise for 30 minutes prior to checking your blood pressure. These agents can affect the accuracy of the blood pressure reading.  This information is not intended to replace advice given to you by your health care provider. Make sure you discuss any questions you have with your health care provider.  Document Revised: 12/11/2020 Document Reviewed: 12/11/2020  [employer] Patient Education © 2021 [employer]    Managing Your Hypertension  Hypertension, also called high blood pressure, is when the force of the blood pressing against the walls of the arteries is too strong. Arteries are blood vessels that carry blood from your heart throughout your body. Hypertension forces the heart to work harder to pump blood and may cause the arteries to become narrow or  stiff.  Understanding blood pressure readings  Your personal target blood pressure may vary depending on your medical conditions, your age, and other factors. A blood pressure reading includes a higher number over a lower number. Ideally, your blood pressure should be below 120/80. You should know that:  · The first, or top, number is called the systolic pressure. It is a measure of the pressure in your arteries as your heart beats.  · The second, or bottom number, is called the diastolic pressure. It is a measure of the pressure in your arteries as the heart relaxes.  Blood pressure is classified into four stages. Based on your blood pressure reading, your health care provider may use the following stages to determine what type of treatment you need, if any. Systolic pressure and diastolic pressure are measured in a unit called mmHg.  Normal  · Systolic pressure: below 120.  · Diastolic pressure: below 80.  Elevated  · Systolic pressure: 120-129.  · Diastolic pressure: below 80.  Hypertension stage 1  · Systolic pressure: 130-139.  · Diastolic pressure: 80-89.  Hypertension stage 2  · Systolic pressure: 140 or above.  · Diastolic pressure: 90 or above.  How can this condition affect me?  Managing your hypertension is an important responsibility. Over time, hypertension can damage the arteries and decrease blood flow to important parts of the body, including the brain, heart, and kidneys. Having untreated or uncontrolled hypertension can lead to:  · A heart attack.  · A stroke.  · A weakened blood vessel (aneurysm).  · Heart failure.  · Kidney damage.  · Eye damage.  · Metabolic syndrome.  · Memory and concentration problems.  · Vascular dementia.  What actions can I take to manage this condition?  Hypertension can be managed by making lifestyle changes and possibly by taking medicines. Your health care provider will help you make a plan to bring your blood pressure within a normal range.  Nutrition    · Eat a diet  that is high in fiber and potassium, and low in salt (sodium), added sugar, and fat. An example eating plan is called the Dietary Approaches to Stop Hypertension (DASH) diet. To eat this way:  ? Eat plenty of fresh fruits and vegetables. Try to fill one-half of your plate at each meal with fruits and vegetables.  ? Eat whole grains, such as whole-wheat pasta, brown rice, or whole-grain bread. Fill about one-fourth of your plate with whole grains.  ? Eat low-fat dairy products.  ? Avoid fatty cuts of meat, processed or cured meats, and poultry with skin. Fill about one-fourth of your plate with lean proteins such as fish, chicken without skin, beans, eggs, and tofu.  ? Avoid pre-made and processed foods. These tend to be higher in sodium, added sugar, and fat.  · Reduce your daily sodium intake. Most people with hypertension should eat less than 1,500 mg of sodium a day.    Lifestyle    · Work with your health care provider to maintain a healthy body weight or to lose weight. Ask what an ideal weight is for you.  · Get at least 30 minutes of exercise that causes your heart to beat faster (aerobic exercise) most days of the week. Activities may include walking, swimming, or biking.  · Include exercise to strengthen your muscles (resistance exercise), such as weight lifting, as part of your weekly exercise routine. Try to do these types of exercises for 30 minutes at least 3 days a week.  · Do not use any products that contain nicotine or tobacco, such as cigarettes, e-cigarettes, and chewing tobacco. If you need help quitting, ask your health care provider.  · Control any long-term (chronic) conditions you have, such as high cholesterol or diabetes.  · Identify your sources of stress and find ways to manage stress. This may include meditation, deep breathing, or making time for fun activities.    Alcohol use  · Do not drink alcohol if:  ? Your health care provider tells you not to drink.  ? You are pregnant, may be  pregnant, or are planning to become pregnant.  · If you drink alcohol:  ? Limit how much you use to:  § 0-1 drink a day for women.  § 0-2 drinks a day for men.  ? Be aware of how much alcohol is in your drink. In the [place] one drink equals one 12 oz bottle of beer (355 mL), one 5 oz glass of wine (148 mL), or one 1½ oz glass of hard liquor (44 mL).  Medicines  Your health care provider may prescribe medicine if lifestyle changes are not enough to get your blood pressure under control and if:  · Your systolic blood pressure is 130 or higher.  · Your diastolic blood pressure is 80 or higher.  Take medicines only as told by your health care provider. Follow the directions carefully. Blood pressure medicines must be taken as told by your health care provider. The medicine does not work as well when you skip doses. Skipping doses also puts you at risk for problems.  Monitoring  Before you monitor your blood pressure:  · Do not smoke, drink caffeinated beverages, or exercise within 30 minutes before taking a measurement.  · Use the bathroom and empty your bladder (urinate).  · Sit quietly for at least 5 minutes before taking measurements.  Monitor your blood pressure at home as told by your health care provider. To do this:  · Sit with your back straight and supported.  · Place your feet flat on the floor. Do not cross your legs.  · Support your arm on a flat surface, such as a table. Make sure your upper arm is at heart level.  · Each time you measure, take two or three readings one minute apart and record the results.  You may also need to have your blood pressure checked regularly by your health care provider.  General information  · Talk with your health care provider about your diet, exercise habits, and other lifestyle factors that may be contributing to hypertension.  · Review all the medicines you take with your health care provider because there may be side effects or interactions.  · Keep all visits as told by  your health care provider. Your health care provider can help you create and adjust your plan for managing your high blood pressure.  Where to find more information  · National Heart, Lung, and Blood Page: www.nhlbi.nih.gov  · American Heart Association: www.heart.org  Contact a health care provider if:  · You think you are having a reaction to medicines you have taken.  · You have repeated (recurrent) headaches.  · You feel dizzy.  · You have swelling in your ankles.  · You have trouble with your vision.  Get help right away if:  · You develop a severe headache or confusion.  · You have unusual weakness or numbness, or you feel faint.  · You have severe pain in your chest or abdomen.  · You vomit repeatedly.  · You have trouble breathing.  These symptoms may represent a serious problem that is an emergency. Do not wait to see if the symptoms will go away. Get medical help right away. Call your local emergency services (911 in the [place] Do not drive yourself to the hospital.  Summary  · Hypertension is when the force of blood pumping through your arteries is too strong. If this condition is not controlled, it may put you at risk for serious complications.  · Your personal target blood pressure may vary depending on your medical conditions, your age, and other factors. For most people, a normal blood pressure is less than 120/80.  · Hypertension is managed by lifestyle changes, medicines, or both.  · Lifestyle changes to help manage hypertension include losing weight, eating a healthy, low-sodium diet, exercising more, stopping smoking, and limiting alcohol.  This information is not intended to replace advice given to you by your health care provider. Make sure you discuss any questions you have with your health care provider.  Document Revised: 01/22/2021 Document Reviewed: 11/17/2020  Elsevier Patient Education © 2021 [employer] Inc.  Managing Your High Blood Pressure  Identify normal blood pressure numbers,  and list steps to help keep blood pressure in a normal range.   To view the content, go to this web address:  [email or web address]    This video will  on: 2023. If you need access to this video following this date, please reach out to the healthcare provider who assigned it to you.  This information is not intended to replace advice given to you by your health care provider. Make sure you discuss any questions you have with your health care provider.  [facility]’s editorial and clinical teams regularly review and update content to ensure it is up-to-date with changing practice standards and recognized medical guidelines.  Document Revised: 2021  [employer] Patient Education 2021 [employer] Inc.  Blood Pressure Medicines  After viewing this video, you will be able to describe the indications, common side effects, and special considerations for blood pressure medicines after a heart attack or stroke.  To view the content, go to this web address:  [email or web address]    This video will  on: 2023. If you need access to this video following this date, please reach out to the healthcare provider who assigned it to you.  This information is not intended to replace advice given to you by your health care provider. Make sure you discuss any questions you have with your health care provider.  [facility]’s editorial and clinical teams regularly review and update content to ensure it is up-to-date with changing practice standards and recognized medical guidelines.  Document Revised: 2021  [facility] Patient Education 2021 [employer] Inc.

## 2022-01-13 NOTE — ASSESSMENT & PLAN NOTE
Hypertension is worsening.  Continue current treatment regimen.  Dietary sodium restriction.  Regular aerobic exercise.  Continue current medications.  Ambulatory blood pressure monitoring.  Blood pressure will be reassessed in 2 weeks.  Patient advised to limit her caffeine intake, avoid Sudafed-containing cold products, increase water intake.  Recommend patient discontinue taking ibuprofen.  I advised patient that if her blood pressure consistently remains above 150 systolic or above 100 diastolic she may take 7.5 mg of amlodipine as needed. Otherwise she is to remain on the 5 mg dosage of amlodipine.  Recommend she follow-up with her PCP Dr. Sewell in 2 weeks.

## 2022-01-14 ENCOUNTER — TELEPHONE (OUTPATIENT)
Dept: ORTHOPEDIC SURGERY | Facility: CLINIC | Age: 67
End: 2022-01-14

## 2022-01-14 ENCOUNTER — TELEPHONE (OUTPATIENT)
Dept: FAMILY MEDICINE CLINIC | Facility: CLINIC | Age: 67
End: 2022-01-14

## 2022-01-14 DIAGNOSIS — I10 ESSENTIAL HYPERTENSION: Primary | ICD-10-CM

## 2022-01-14 RX ORDER — HYDROCHLOROTHIAZIDE 12.5 MG/1
12.5 TABLET ORAL DAILY
Qty: 30 TABLET | Refills: 2 | OUTPATIENT
Start: 2022-01-14 | End: 2022-01-15

## 2022-01-14 NOTE — TELEPHONE ENCOUNTER
Pt called in to state that since she received a Cortisone shot ON 1-13-22 her BP has been elevated, running in the range of 180/90. Wanted to know if this normal. And if so, how long will it last. Would like a call back please

## 2022-01-14 NOTE — TELEPHONE ENCOUNTER
Patient said that her bp is still going out of control, today 180/80 took amlodipine and tylenol it came down to 150/80 now 178/80 and she took another 1/2 of amlodipine and some more tylenol. She called Dr. Chavez's office and they said she needed to call us. She needs to know what to do.

## 2022-01-14 NOTE — TELEPHONE ENCOUNTER
Hub staff attempted to follow warm transfer process and was unsuccessful     Caller: Inez Ladd    Relationship to patient: Self    Best call back number: [phone]    Patient is needing: PATIENT REQUESTING TO SPEAK WITH SOMEONE BEFORE CLOSING IN REGARD TO BLOOD PRESSURE

## 2022-01-14 NOTE — TELEPHONE ENCOUNTER
I called and LVM that she should contact her PCP. If she has any futher questions to give us a call back.  Cherry

## 2022-01-16 ENCOUNTER — TELEPHONE (OUTPATIENT)
Dept: URGENT CARE | Facility: CLINIC | Age: 67
End: 2022-01-16

## 2022-01-18 ENCOUNTER — TELEPHONE (OUTPATIENT)
Dept: FAMILY MEDICINE CLINIC | Facility: CLINIC | Age: 67
End: 2022-01-18

## 2022-01-19 ENCOUNTER — OFFICE VISIT (OUTPATIENT)
Dept: FAMILY MEDICINE CLINIC | Facility: CLINIC | Age: 67
End: 2022-01-19

## 2022-01-19 VITALS
BODY MASS INDEX: 26.69 KG/M2 | OXYGEN SATURATION: 97 % | DIASTOLIC BLOOD PRESSURE: 63 MMHG | HEIGHT: 69 IN | TEMPERATURE: 98 F | WEIGHT: 180.2 LBS | HEART RATE: 88 BPM | SYSTOLIC BLOOD PRESSURE: 140 MMHG

## 2022-01-19 DIAGNOSIS — I10 PRIMARY HYPERTENSION: Primary | ICD-10-CM

## 2022-01-19 DIAGNOSIS — T88.7XXA MEDICATION SIDE EFFECT: ICD-10-CM

## 2022-01-19 PROCEDURE — 99214 OFFICE O/P EST MOD 30 MIN: CPT | Performed by: FAMILY MEDICINE

## 2022-01-19 NOTE — PROGRESS NOTES
Follow Up Office Visit      Patient Name: Inez Ladd  : 1955   MRN: [id number]     Chief Complaint:    Chief Complaint   Patient presents with   • Hypertension       History of Present Illness: Inez Ladd is a 66 y.o. female who is here today to follow up with hypertension.  Patient seems to have anxiety after having a knee injection.  Her knee is a lot better.  Patient says her blood pressure has been ranging between 130 and 190 systolic range.  She is also had some mild head discomfort related to the shot and blood pressure.  Blood pressure has improved and she is currently on lisinopril amlodipine.    Review of systems was positive for headache    Physical exam: Patient's heart exam was normal without murmurs.      Subjective        I have reviewed and the following portions of the patient's history were updated as appropriate: past family history, past medical history, past social history, past surgical history and problem list.    Medications:     Current Outpatient Medications:   •  acetaminophen (TYLENOL) 500 MG tablet, Take 500 mg by mouth Every 6 (Six) Hours As Needed for Mild Pain ., Disp: , Rfl:   •  amLODIPine (NORVASC) 5 MG tablet, TAKE 1 TABLET BY MOUTH EVERY DAY, Disp: 90 tablet, Rfl: 1  •  Diclofenac Sodium (VOLTAREN) 1 % gel gel, Apply 4 g topically to the appropriate area as directed 4 (Four) Times a Day As Needed (Pain in right knee)., Disp: 150 g, Rfl: 1  •  estradiol (ESTRACE VAGINAL) 0.1 MG/GM vaginal cream, Insert 1 gm intravaginally 3 times each week for 3 weeks and then weekly thereafter, Disp: 1 each, Rfl: 3  •  ibuprofen (ADVIL,MOTRIN) 400 MG tablet, Take 400 mg by mouth Every 6 (Six) Hours As Needed for Mild Pain . Morning and evening, Disp: , Rfl:   •  lisinopril (PRINIVIL,ZESTRIL) 5 MG tablet, Take 1 tablet by mouth Daily., Disp: 14 tablet, Rfl: 0  •  Misc. Devices (Ring Pessary/Support) misc, 1 each Daily. #6 ring with support, Disp: 1 each, Rfl: 0  •  tiZANidine  "(ZANAFLEX) 4 MG tablet, , Disp: , Rfl:     Allergies:   No Known Allergies    Objective     Physical Exam: Please see above  Vital Signs:   Vitals:    01/19/22 0851   BP: 140/63   Pulse: 88   Temp: 98 °F (36.7 °C)   SpO2: 97%   Weight: 81.7 kg (180 lb 3.2 oz)   Height: 175.3 cm (69\")   PainSc: 0-No pain     Body mass index is 26.61 kg/m².          Assessment / Plan      Assessment/Plan:   Diagnoses and all orders for this visit:    1. Primary hypertension (Primary)    2. Medication side effect    Patient presented with medication side effect from cortisone injection.  Likely this caused her increased blood pressure,  anxiety.  Patient's hypertension has been fairly uncontrolled but has been improving recently with addition of lisinopril.  Patient today wanted to discuss options of decreasing/increasing medications as needed.  Patient's worried about her blood pressure come back to normal and then having side effects from her lisinopril medication.  Today we discussed options of decreasing lisinopril over time and possibly stopping it if her blood pressure improves over the next month.    At this time we will continue lisinopril and amlodipine 5 mg for her blood pressure control.  In the future we could increase amlodipine to 10 mg and stop lisinopril if needed.  We can also increase lisinopril as needed.  Patient will follow-up in 4 weeks and will decide further management at that time.        Follow Up:   No follow-ups on file.    Curt Sewell DO  [facility]   "

## 2022-01-25 DIAGNOSIS — I10 PRIMARY HYPERTENSION: ICD-10-CM

## 2022-01-25 RX ORDER — LISINOPRIL 5 MG/1
5 TABLET ORAL DAILY
Qty: 14 TABLET | Refills: 0 | Status: SHIPPED | OUTPATIENT
Start: 2022-01-25 | End: 2022-02-07 | Stop reason: SDUPTHER

## 2022-01-25 NOTE — TELEPHONE ENCOUNTER
Caller: Inez Ladd    Relationship: Self    Best call back number: [phone]    Requested Prescriptions:   Requested Prescriptions     Pending Prescriptions Disp Refills   • lisinopril (PRINIVIL,ZESTRIL) 5 MG tablet 14 tablet 0     Sig: Take 1 tablet by mouth Daily.        Pharmacy where request should be sent: [facility]/PHARMACY #[id number] - [facility] [address] [facility] [phone] [facility] [phone]      Additional details provided by patient:  PATIENT HAS 4 PILLS LEFT    Does the patient have less than a 3 day supply:  [] Yes  [x] No    Briana Ibrahim   01/25/22 08:14 EST

## 2022-01-31 DIAGNOSIS — I10 ESSENTIAL HYPERTENSION: ICD-10-CM

## 2022-01-31 RX ORDER — AMLODIPINE BESYLATE 5 MG/1
5 TABLET ORAL DAILY
Qty: 90 TABLET | Refills: 0 | Status: SHIPPED | OUTPATIENT
Start: 2022-01-31 | End: 2022-02-22 | Stop reason: SDUPTHER

## 2022-02-01 ENCOUNTER — TELEPHONE (OUTPATIENT)
Dept: FAMILY MEDICINE CLINIC | Facility: CLINIC | Age: 67
End: 2022-02-01

## 2022-02-01 ENCOUNTER — OFFICE VISIT (OUTPATIENT)
Dept: ORTHOPEDIC SURGERY | Facility: CLINIC | Age: 67
End: 2022-02-01

## 2022-02-01 VITALS — BODY MASS INDEX: 26.68 KG/M2 | WEIGHT: 180.12 LBS | HEIGHT: 69 IN

## 2022-02-01 DIAGNOSIS — M17.11 PRIMARY OSTEOARTHRITIS OF RIGHT KNEE: ICD-10-CM

## 2022-02-01 DIAGNOSIS — M70.50 PES ANSERINE BURSITIS: Primary | ICD-10-CM

## 2022-02-01 PROCEDURE — 99213 OFFICE O/P EST LOW 20 MIN: CPT | Performed by: ORTHOPAEDIC SURGERY

## 2022-02-01 PROCEDURE — 20610 DRAIN/INJ JOINT/BURSA W/O US: CPT | Performed by: ORTHOPAEDIC SURGERY

## 2022-02-01 RX ORDER — LIDOCAINE HYDROCHLORIDE 10 MG/ML
2 INJECTION, SOLUTION EPIDURAL; INFILTRATION; INTRACAUDAL; PERINEURAL
Status: COMPLETED | OUTPATIENT
Start: 2022-02-01 | End: 2022-02-01

## 2022-02-01 RX ORDER — BUPIVACAINE HYDROCHLORIDE 2.5 MG/ML
2 INJECTION, SOLUTION EPIDURAL; INFILTRATION; INTRACAUDAL
Status: COMPLETED | OUTPATIENT
Start: 2022-02-01 | End: 2022-02-01

## 2022-02-01 RX ORDER — TRIAMCINOLONE ACETONIDE 40 MG/ML
40 INJECTION, SUSPENSION INTRA-ARTICULAR; INTRAMUSCULAR
Status: COMPLETED | OUTPATIENT
Start: 2022-02-01 | End: 2022-02-01

## 2022-02-01 RX ADMIN — BUPIVACAINE HYDROCHLORIDE 2 ML: 2.5 INJECTION, SOLUTION EPIDURAL; INFILTRATION; INTRACAUDAL at 08:05

## 2022-02-01 RX ADMIN — LIDOCAINE HYDROCHLORIDE 2 ML: 10 INJECTION, SOLUTION EPIDURAL; INFILTRATION; INTRACAUDAL; PERINEURAL at 08:05

## 2022-02-01 RX ADMIN — TRIAMCINOLONE ACETONIDE 40 MG: 40 INJECTION, SUSPENSION INTRA-ARTICULAR; INTRAMUSCULAR at 08:05

## 2022-02-01 NOTE — TELEPHONE ENCOUNTER
Caller: Inez Ladd    Relationship: Self    Best call back number:[phone] (H)    PATIENT CALLED AND STATED SHE RECEIVED A CORTISONE SHOT LAST WEEK AND IT MADE HER BLOOD PRESSURE REALLY HIGH. PATIENT RECEIVED ANOTHER CORTISONE SHOT TODAY AND WANTS TO KNOW WHAT SHE NEEDS TO DO WHEN HER BLOOD PRESSURE GETS REALLY HIGH AGAIN TODAY     PLEASE ADVISE

## 2022-02-01 NOTE — TELEPHONE ENCOUNTER
Spoke with pt and advised her to monitor her bp, if starts to get high again call the office for advice from Dr. Sewell.

## 2022-02-01 NOTE — PROGRESS NOTES
Procedure   Large Joint Arthrocentesis: R knee  Date/Time: 2/1/2022 8:05 AM  Consent given by: patient  Site marked: site marked  Timeout: Immediately prior to procedure a time out was called to verify the correct patient, procedure, equipment, support staff and site/side marked as required   Supporting Documentation  Indications: pain   Procedure Details  Location: knee - R knee  Preparation: Patient was prepped and draped in the usual sterile fashion  Needle size: 22 G  Approach: anteromedial  Medications administered: 2 mL bupivacaine (PF) 0.25 %; 2 mL lidocaine PF 1% 1 %; 40 mg triamcinolone acetonide 40 MG/ML  Patient tolerance: patient tolerated the procedure well with no immediate complications

## 2022-02-01 NOTE — PROGRESS NOTES
Orthopaedic Clinic Note: Knee Established Patient    Chief Complaint   Patient presents with   • Follow-up     3 week f/u--Primary osteoarthritis of right knee        HPI    It has been 3  week(s) since Ms. Ladd's last visit. She returns to clinic today for follow-up right knee pain.  She underwent intra-articular injection in the right knee 3 weeks ago.  She states the injection provided excellent relief for the posterior aspect of her knee.  However, she is now complaining of pain localized anterior medial aspect of the knee that she rates a 9/10 on the pain scale.  She states it is painful to walk and bend the knee.  She is ambulating with no assistive device.  Denies fever chills or constitutional symptoms.  Overall her knee pain has improved but this alternative pain has gotten worse.    Past Medical History:   Diagnosis Date   • Acute cystitis    • Acute pharyngitis    • Body aches    • Cancer (HCC)     skin    • Cellulitis    • Dysuria    • H/O bladder infections     2 SINCE AUGUST, urinary tract infection   • History of prolapse of bladder      Bladder disorder   • Hypertension    • Impacted cerumen     Impacted cerumen, unspecified ear    • Infected insect bite of forearm    • Menopause    • Prophylactic immunotherapy    • Separation of AC joint 2006    Right Shoulder      Past Surgical History:   Procedure Laterality Date   • CARPAL TUNNEL RELEASE     • KNEE SURGERY        Family History   Problem Relation Age of Onset   • Coronary artery disease Other    • Coronary artery disease Mother    • Hypertension Mother    • Dementia Mother    • No Known Problems Father    • Heart disease Maternal Grandmother    • Breast cancer Neg Hx    • Ovarian cancer Neg Hx      Social History     Socioeconomic History   • Marital status:    Tobacco Use   • Smoking status: Former Smoker     Packs/day: 0.25     Years: 14.00     Pack years: 3.50     Types: Cigarettes     Quit date: 1/1/1998     Years since quitting:  24.1   • Smokeless tobacco: Never Used   Vaping Use   • Vaping Use: Never used   Substance and Sexual Activity   • Alcohol use: Yes     Comment: Occasional alcohol use    • Drug use: No   • Sexual activity: Not Currently     Birth control/protection: None      Current Outpatient Medications on File Prior to Visit   Medication Sig Dispense Refill   • acetaminophen (TYLENOL) 500 MG tablet Take 500 mg by mouth Every 6 (Six) Hours As Needed for Mild Pain .     • amLODIPine (NORVASC) 5 MG tablet Take 1 tablet by mouth Daily. 90 tablet 0   • estradiol (ESTRACE VAGINAL) 0.1 MG/GM vaginal cream Insert 1 gm intravaginally 3 times each week for 3 weeks and then weekly thereafter 1 each 3   • ibuprofen (ADVIL,MOTRIN) 400 MG tablet Take 400 mg by mouth Every 6 (Six) Hours As Needed for Mild Pain . Morning and evening     • lisinopril (PRINIVIL,ZESTRIL) 5 MG tablet Take 1 tablet by mouth Daily. 14 tablet 0   • Misc. Devices (Ring Pessary/Support) misc 1 each Daily. #6 ring with support 1 each 0   • Diclofenac Sodium (VOLTAREN) 1 % gel gel Apply 4 g topically to the appropriate area as directed 4 (Four) Times a Day As Needed (Pain in right knee). 150 g 1   • tiZANidine (ZANAFLEX) 4 MG tablet      • [DISCONTINUED] hydroCHLOROthiazide (HYDRODIURIL) 12.5 MG tablet Take 1 tablet by mouth Daily. 30 tablet 2     No current facility-administered medications on file prior to visit.      No Known Allergies     Review of Systems   Constitutional: Positive for activity change.   HENT: Negative.    Eyes: Negative.    Respiratory: Negative.    Cardiovascular: Negative.    Gastrointestinal: Negative.    Endocrine: Negative.    Genitourinary: Positive for urgency.   Musculoskeletal: Positive for arthralgias and gait problem.   Skin: Negative.    Allergic/Immunologic: Negative.    Hematological: Negative.    Psychiatric/Behavioral: Negative.         The patient's Review of Systems was personally reviewed and confirmed as accurate.    Physical  "Exam  Height 175.3 cm (69.02\"), weight 81.7 kg (180 lb 1.9 oz), not currently breastfeeding.    Body mass index is 26.59 kg/m².    GENERAL APPEARANCE: awake, alert, oriented, in no acute distress and well developed, well nourished  LUNGS:  breathing nonlabored  EXTREMITIES: no clubbing, cyanosis  PERIPHERAL PULSES: palpable dorsalis pedis and posterior tibial pulses bilaterally.    GAIT:  Normal        ----------  Right Knee Exam:  ----------  ALIGNMENT: neutral  ----------  RANGE OF MOTION:  Normal (0-120 degrees) with no extensor lag or flexion contracture  LIGAMENTOUS STABILITY:   stable to varus and valgus stress at terminal extension and 30 degrees without any evidence of laxity  ----------  STRENGTH:  KNEE FLEXION 5/5  KNEE EXTENSION  5/5  ANKLE DORSIFLEXION  5/5  ANKLE PLANTARFLEXION  5/5  ----------  PAIN WITH PALPATION:pes bursa  KNEE EFFUSION: no  PAIN WITH KNEE ROM: yes  PATELLAR CREPITUS:  yes, asymptomatic  ----------  SENSATION TO LIGHT TOUCH:  DEEP PERONEAL/SUPERFICIAL PERONEAL/SURAL/SAPHENOUS/TIBIAL:    intact  ----------  EDEMA:  no  ERYTHEMA:    no  WOUNDS/INCISIONS:   no  _____________________________________________________________________  _____________________________________________________________________    RADIOGRAPHIC FINDINGS:   No new imaging today    Assessment/Plan:   Diagnosis Plan   1. Pes anserine bursitis     2. Primary osteoarthritis of right knee       Patient's intra-articular inflammation is significantly improved.  Her pain today is localized to the pes bursa.  She is suffering from pes bursitis, I explained that this can sometimes occur as a side effect of knee osteoarthritis.  Clinically, her knee joint does not appear to be symptomatic.  Her pain is at the pes bursa.  I recommended pes bursa cortisone injection today.  She is agreeable to this plan.  She'll follow-up as needed.    Procedure Note:  I discussed with the patient the potential benefits of performing a " therapeutic injection of the right knee pes bursa as well as potential risks including but not limited to infection, swelling, pain, bleeding, bruising, nerve/vessel damage, skin color changes, transient elevation in blood glucose levels, and fat atrophy. After informed consent and after the area was prepped with alcohol, ethyl chloride was used to numb the skin. Via the anteromedial approach, 2 cc of 1% lidocaine, 2 cc of 0.25% bupivicaine and 1 cc of 40mg/ml of Kenalog were injected into the right knee pes bursa. The patient tolerated the procedure well. There were no complications. A sterile dressing was placed over the injection site.      Bradly Chavez MD  02/01/22  08:31 EST

## 2022-02-07 ENCOUNTER — TELEPHONE (OUTPATIENT)
Dept: ORTHOPEDIC SURGERY | Facility: CLINIC | Age: 67
End: 2022-02-07

## 2022-02-07 DIAGNOSIS — I10 PRIMARY HYPERTENSION: ICD-10-CM

## 2022-02-07 RX ORDER — LISINOPRIL 5 MG/1
5 TABLET ORAL DAILY
Qty: 14 TABLET | Refills: 0 | Status: SHIPPED | OUTPATIENT
Start: 2022-02-07 | End: 2022-02-22 | Stop reason: SDUPTHER

## 2022-02-07 NOTE — TELEPHONE ENCOUNTER
Caller: Inez Ladd    Relationship: Self    Best call back number: [phone]    Requested Prescriptions:   Requested Prescriptions     Pending Prescriptions Disp Refills   • lisinopril (PRINIVIL,ZESTRIL) 5 MG tablet 14 tablet 0     Sig: Take 1 tablet by mouth Daily.        Pharmacy where request should be sent: [facility]/PHARMACY #[id number] - [address] [phone] [facility] [phone]      Additional details provided by patient: PATIENT REQUESTING REFILL TO HOLD HER OVER UNTIL UPCOMING APPOINTMENT ON 2/22/22. PATIENT STATED THE MEDICATION IS WORKING WELL.    Does the patient have less than a 3 day supply:  [x] Yes  [] No    Darlene Carrasco Rep   02/07/22 16:59 EST

## 2022-02-07 NOTE — TELEPHONE ENCOUNTER
PATIENT CALLED BACK AND STATES HER TEST RESULT WAS NEGATIVE FOR COVID- REQUESTING TO SEE IF ANY APPT AVAIL FOR HER TO BE SEEN TODAY?

## 2022-02-07 NOTE — TELEPHONE ENCOUNTER
Caller: PRAVEENA LUDWIG    Relationship to patient: SELF    Best call back number:     Chief complaint: RIGHT KNEE PAIN     Type of visit: FUP     Requested date: 2/8/22    If rescheduling, when is the original appointment: 2/10/22    Additional notes:PATIENT IS HAVING WORSE KNEE PAIN THAN WHEN SHE WAS SAW LAST WEEK.  I BOOKED HER FOR FIRST AVAILABLE APPT BUT SHE WANTED TO BE SEEN TODAY OR TOMORROW IF POSSIBLE.  PATIENT ALSO SAID HER DAUGHTER HAS COVID AND SHE IS GOING TO GET TESTED TODAY.  I ADVISED HER TO PLEASE CALL BACK AND LET US KNOW RESULTS SO WE CAN FIGURE OUT WHAT TO DO.  SHE HAS NO SYMPTOMS

## 2022-02-10 ENCOUNTER — OFFICE VISIT (OUTPATIENT)
Dept: ORTHOPEDIC SURGERY | Facility: CLINIC | Age: 67
End: 2022-02-10

## 2022-02-10 VITALS
HEIGHT: 69 IN | WEIGHT: 180.12 LBS | SYSTOLIC BLOOD PRESSURE: 145 MMHG | DIASTOLIC BLOOD PRESSURE: 80 MMHG | BODY MASS INDEX: 26.68 KG/M2

## 2022-02-10 DIAGNOSIS — M17.11 PRIMARY OSTEOARTHRITIS OF RIGHT KNEE: Primary | ICD-10-CM

## 2022-02-10 PROCEDURE — 99214 OFFICE O/P EST MOD 30 MIN: CPT | Performed by: ORTHOPAEDIC SURGERY

## 2022-02-10 RX ORDER — MELOXICAM 15 MG/1
TABLET ORAL
Qty: 90 TABLET | Refills: 1 | Status: SHIPPED | OUTPATIENT
Start: 2022-02-10 | End: 2022-02-22

## 2022-02-10 NOTE — PROGRESS NOTES
Orthopaedic Clinic Note: Knee Established Patient    Chief Complaint   Patient presents with   • Follow-up     9 day recheck - Pes anserine bursitis of right knee        HPI    It has been 9  day(s) since Ms. Ladd's last visit. She returns to clinic today for follow-up right knee pain.  She is previously been seen for right knee osteoarthritis as well as pes bursitis.  She is undergone intra-articular injections to both the knee and the pes bursa with trace relief.  She comes clinic today complaining of pain localized anterior aspect of the knee.  She states it is worse with sitting and standing.  Rates pain 9/10 on the pain scale.  She is ambulating with no assistive device.  Overall she is doing worse and is requesting further intervention.    Past Medical History:   Diagnosis Date   • Acute cystitis    • Acute pharyngitis    • Body aches    • Cancer (HCC)     skin    • Cellulitis    • Dysuria    • H/O bladder infections     2 SINCE AUGUST, urinary tract infection   • History of prolapse of bladder      Bladder disorder   • Hypertension    • Impacted cerumen     Impacted cerumen, unspecified ear    • Infected insect bite of forearm    • Menopause    • Prophylactic immunotherapy    • Separation of AC joint 2006    Right Shoulder      Past Surgical History:   Procedure Laterality Date   • CARPAL TUNNEL RELEASE     • KNEE SURGERY        Family History   Problem Relation Age of Onset   • Coronary artery disease Other    • Coronary artery disease Mother    • Hypertension Mother    • Dementia Mother    • No Known Problems Father    • Heart disease Maternal Grandmother    • Breast cancer Neg Hx    • Ovarian cancer Neg Hx      Social History     Socioeconomic History   • Marital status:    Tobacco Use   • Smoking status: Former Smoker     Packs/day: 0.25     Years: 14.00     Pack years: 3.50     Types: Cigarettes     Quit date: 1998     Years since quittin.1   • Smokeless tobacco: Never Used   Vaping Use  "  • Vaping Use: Never used   Substance and Sexual Activity   • Alcohol use: Yes     Comment: Occasional alcohol use    • Drug use: No   • Sexual activity: Not Currently     Birth control/protection: None      Current Outpatient Medications on File Prior to Visit   Medication Sig Dispense Refill   • acetaminophen (TYLENOL) 500 MG tablet Take 500 mg by mouth Every 6 (Six) Hours As Needed for Mild Pain .     • amLODIPine (NORVASC) 5 MG tablet Take 1 tablet by mouth Daily. 90 tablet 0   • Diclofenac Sodium (VOLTAREN) 1 % gel gel Apply 4 g topically to the appropriate area as directed 4 (Four) Times a Day As Needed (Pain in right knee). 150 g 1   • estradiol (ESTRACE VAGINAL) 0.1 MG/GM vaginal cream Insert 1 gm intravaginally 3 times each week for 3 weeks and then weekly thereafter 1 each 3   • lisinopril (PRINIVIL,ZESTRIL) 5 MG tablet Take 1 tablet by mouth Daily. 14 tablet 0   • Misc. Devices (Ring Pessary/Support) misc 1 each Daily. #6 ring with support 1 each 0   • tiZANidine (ZANAFLEX) 4 MG tablet      • [DISCONTINUED] ibuprofen (ADVIL,MOTRIN) 400 MG tablet Take 400 mg by mouth Every 6 (Six) Hours As Needed for Mild Pain . Morning and evening     • [DISCONTINUED] hydroCHLOROthiazide (HYDRODIURIL) 12.5 MG tablet Take 1 tablet by mouth Daily. 30 tablet 2     No current facility-administered medications on file prior to visit.      No Known Allergies     Review of Systems   Constitutional: Negative.    HENT: Negative.    Eyes: Negative.    Respiratory: Negative.    Cardiovascular: Negative.    Gastrointestinal: Negative.    Endocrine: Negative.    Genitourinary: Negative.    Musculoskeletal: Positive for arthralgias.   Skin: Negative.    Allergic/Immunologic: Negative.    Neurological: Negative.    Hematological: Negative.    Psychiatric/Behavioral: Negative.         The patient's Review of Systems was personally reviewed and confirmed as accurate.    Physical Exam  Blood pressure 145/80, height 175.3 cm (69.02\"), " weight 81.7 kg (180 lb 1.9 oz), not currently breastfeeding.    Body mass index is 26.59 kg/m².    GENERAL APPEARANCE: awake, alert, oriented, in no acute distress and well developed, well nourished  LUNGS:  breathing nonlabored  EXTREMITIES: no clubbing, cyanosis  PERIPHERAL PULSES: palpable dorsalis pedis and posterior tibial pulses bilaterally.    GAIT:  Antalgic        ----------  Right Knee Exam:  ----------  ALIGNMENT: neutral  ----------  RANGE OF MOTION:  Normal (0-120 degrees) with no extensor lag or flexion contracture  LIGAMENTOUS STABILITY:   stable to varus and valgus stress at terminal extension and 30 degrees without any evidence of laxity  ----------  STRENGTH:  KNEE FLEXION 5/5  KNEE EXTENSION  5/5  ANKLE DORSIFLEXION  5/5  ANKLE PLANTARFLEXION  5/5  ----------  PAIN WITH PALPATION:pes bursa, anterior knee  KNEE EFFUSION: no  PAIN WITH KNEE ROM: yes anteriorly  PATELLAR CREPITUS:  yes, painful and symptomatic  ----------  SENSATION TO LIGHT TOUCH:  DEEP PERONEAL/SUPERFICIAL PERONEAL/SURAL/SAPHENOUS/TIBIAL:    intact  ----------  EDEMA:  no  ERYTHEMA:    no  WOUNDS/INCISIONS:   no    _____________________________________________________________________  _____________________________________________________________________    RADIOGRAPHIC FINDINGS:   No new imaging today    Assessment/Plan:   Diagnosis Plan   1. Primary osteoarthritis of right knee  meloxicam (MOBIC) 15 MG tablet     Patient continues to complain of anterior based knee pain that has been refractory to over-the-counter anti-inflammatory as well as intra-articular injections.  I discussed alternative treatment options.  Given her localization to the anterior aspect of the knee, I do not recommend an MRI at this time.  We will transition her from over-the-counter ibuprofen to prescription meloxicam.  I encouraged her to take this every day for the next 2 weeks to see if this alleviates her pain.  If not, an MRI may be warranted.  She  will follow-up as needed.      Bradly Chavez MD  02/10/22  09:20 EST

## 2022-02-11 ENCOUNTER — TELEPHONE (OUTPATIENT)
Dept: ORTHOPEDIC SURGERY | Facility: CLINIC | Age: 67
End: 2022-02-11

## 2022-02-11 DIAGNOSIS — M70.50 PES ANSERINE BURSITIS: ICD-10-CM

## 2022-02-11 DIAGNOSIS — M17.11 PRIMARY OSTEOARTHRITIS OF RIGHT KNEE: Primary | ICD-10-CM

## 2022-02-11 NOTE — TELEPHONE ENCOUNTER
Left patient a message letting her know what Corin said and to call back if she has any further questions or concerns.  Elisa

## 2022-02-11 NOTE — TELEPHONE ENCOUNTER
I will order an MRI.  She just saw Dr. Chavez yesterday and I would recommend that she make sure she give the meloxicam a chance and may try topical Voltaren gel.  There is no indication that this needs to be ordered stat.  If she has pain relief prior to the MRI begin done, she may cancel it.

## 2022-02-11 NOTE — TELEPHONE ENCOUNTER
Patient is in a lot of pain says an MRI was discussed during appointment yesterday patient said she would like to go ahead and have the MRI. Would like to know if she could have it done asap because she is in unbearable pain.    Problem: Falls - Risk of:  Goal: Will remain free from falls  Description: Will remain free from falls  Outcome: Met This Shift  Note: Will continue to monitor patient needs hourly and respond in a timely fashion in order to decrease the risk of patient falls. Goal: Absence of physical injury  Description: Absence of physical injury  Outcome: Met This Shift  Note: Patient will continue to be free from physical injury. Patient's bed alarm is on and hourly rounding is being completed to ensure all patient needs are met. Problem: Activity:  Goal: Expression of feelings of increased energy will increase  Description: Expression of feelings of increased energy will increase  Outcome: Met This Shift  Note: Patient stated that the symptoms have subsided. Problem: Safety:  Goal: Will show no signs and symptoms of excessive bleeding  Description: Will show no signs and symptoms of excessive bleeding  Outcome: Ongoing  Note: Will continue to assess and monitor patient for any signs and symptoms of excessive bleeding due to blood thinners.

## 2022-02-22 ENCOUNTER — OFFICE VISIT (OUTPATIENT)
Dept: FAMILY MEDICINE CLINIC | Facility: CLINIC | Age: 67
End: 2022-02-22

## 2022-02-22 ENCOUNTER — LAB (OUTPATIENT)
Dept: LAB | Facility: HOSPITAL | Age: 67
End: 2022-02-22

## 2022-02-22 VITALS
TEMPERATURE: 97.5 F | SYSTOLIC BLOOD PRESSURE: 134 MMHG | WEIGHT: 181.6 LBS | BODY MASS INDEX: 26.9 KG/M2 | DIASTOLIC BLOOD PRESSURE: 70 MMHG | HEART RATE: 77 BPM | OXYGEN SATURATION: 99 % | HEIGHT: 69 IN

## 2022-02-22 DIAGNOSIS — I10 ESSENTIAL HYPERTENSION: Primary | ICD-10-CM

## 2022-02-22 DIAGNOSIS — M17.12 OSTEOARTHRITIS OF LEFT KNEE, UNSPECIFIED OSTEOARTHRITIS TYPE: ICD-10-CM

## 2022-02-22 LAB
ANION GAP SERPL CALCULATED.3IONS-SCNC: 9.7 MMOL/L (ref 5–15)
BUN SERPL-MCNC: 22 MG/DL (ref 8–23)
BUN/CREAT SERPL: 32.8 (ref 7–25)
CALCIUM SPEC-SCNC: 10.1 MG/DL (ref 8.6–10.5)
CHLORIDE SERPL-SCNC: 102 MMOL/L (ref 98–107)
CO2 SERPL-SCNC: 27.3 MMOL/L (ref 22–29)
CREAT SERPL-MCNC: 0.67 MG/DL (ref 0.57–1)
GFR SERPL CREATININE-BSD FRML MDRD: 88 ML/MIN/1.73
GLUCOSE SERPL-MCNC: 101 MG/DL (ref 65–99)
POTASSIUM SERPL-SCNC: 4.4 MMOL/L (ref 3.5–5.2)
SODIUM SERPL-SCNC: 139 MMOL/L (ref 136–145)

## 2022-02-22 PROCEDURE — 80048 BASIC METABOLIC PNL TOTAL CA: CPT | Performed by: FAMILY MEDICINE

## 2022-02-22 PROCEDURE — 99214 OFFICE O/P EST MOD 30 MIN: CPT | Performed by: FAMILY MEDICINE

## 2022-02-22 RX ORDER — AMLODIPINE BESYLATE 5 MG/1
5 TABLET ORAL DAILY
Qty: 90 TABLET | Refills: 2 | Status: SHIPPED | OUTPATIENT
Start: 2022-02-22 | End: 2023-01-20 | Stop reason: SDUPTHER

## 2022-02-22 RX ORDER — LISINOPRIL 5 MG/1
5 TABLET ORAL DAILY
Qty: 90 TABLET | Refills: 2 | Status: SHIPPED | OUTPATIENT
Start: 2022-02-22 | End: 2022-11-11 | Stop reason: SDUPTHER

## 2022-02-22 RX ORDER — NAPROXEN 500 MG/1
500 TABLET ORAL 2 TIMES DAILY WITH MEALS
Qty: 60 TABLET | Refills: 0 | Status: SHIPPED | OUTPATIENT
Start: 2022-02-22 | End: 2022-03-16 | Stop reason: SDUPTHER

## 2022-02-22 NOTE — PROGRESS NOTES
Follow Up Office Visit      Patient Name: Inez Ladd  : 1955   MRN: [id number]     Chief Complaint:    Chief Complaint   Patient presents with   • Hypertension     F/U       History of Present Illness: Inez Ladd is a 67 y.o. female who is here today to follow up with knee pain and HTN    Knee pain - Patient recently went to Ortho with an appt for an MRI 3/3. The patient was prescribed Meloxicam for the pain with mild relief. She expresses concern for the potential side effects that come with Meloxicam.  She received a shot in posterior knee relieved, but then pain moved to the front of the knee.  Patient will follow up with orthopedics.      Blood pressure medicine working, and brought in her blood pressure log today.  Most of her systolics are around 130.  Patient denies any side effects.  Patient does need a refill.      Discussed possibly stopping 1 medication for blood pressure for her and increasing the other, patient is resistant to this idea would rather stay on 2 medications.      Patient is concerned about meloxicam side effects.  Discussed possible cardiac involvement patient okay with switching to naproxen for safer profile.    Review of systems positive for knee pain      Physical exam: Patient had tenderness of the pes anserine on the right knee.  Patient had crepitus noted with passive range of motion.  Patient had medial joint line tenderness.        Subjective        I have reviewed and the following portions of the patient's history were updated as appropriate: past family history, past medical history, past social history, past surgical history and problem list.    Medications:     Current Outpatient Medications:   •  acetaminophen (TYLENOL) 500 MG tablet, Take 500 mg by mouth Every 6 (Six) Hours As Needed for Mild Pain ., Disp: , Rfl:   •  amLODIPine (NORVASC) 5 MG tablet, Take 1 tablet by mouth Daily., Disp: 90 tablet, Rfl: 2  •  estradiol (ESTRACE VAGINAL) 0.1 MG/GM vaginal  Refill request but patient has an appt tomorrow so refills will be discussed then.   "cream, Insert 1 gm intravaginally 3 times each week for 3 weeks and then weekly thereafter, Disp: 1 each, Rfl: 3  •  lisinopril (PRINIVIL,ZESTRIL) 5 MG tablet, Take 1 tablet by mouth Daily., Disp: 90 tablet, Rfl: 2  •  Misc. Devices (Ring Pessary/Support) misc, 1 each Daily. #6 ring with support, Disp: 1 each, Rfl: 0  •  naproxen (Naprosyn) 500 MG tablet, Take 1 tablet by mouth 2 (Two) Times a Day With Meals., Disp: 60 tablet, Rfl: 0    Allergies:   No Known Allergies    Objective     Physical Exam: Please see above  Vital Signs:   Vitals:    02/22/22 0840   BP: 134/70   Pulse: 77   Temp: 97.5 °F (36.4 °C)   TempSrc: Temporal   SpO2: 99%   Weight: 82.4 kg (181 lb 9.6 oz)   Height: 175.3 cm (69\")   PainSc:   8     Body mass index is 26.82 kg/m².          Assessment / Plan      Assessment/Plan:   Diagnoses and all orders for this visit:    1. Essential hypertension (Primary)  -     Basic Metabolic Panel; Future  -     lisinopril (PRINIVIL,ZESTRIL) 5 MG tablet; Take 1 tablet by mouth Daily.  Dispense: 90 tablet; Refill: 2  -     amLODIPine (NORVASC) 5 MG tablet; Take 1 tablet by mouth Daily.  Dispense: 90 tablet; Refill: 2  -     Basic Metabolic Panel    2. Osteoarthritis of left knee, unspecified osteoarthritis type  -     naproxen (Naprosyn) 500 MG tablet; Take 1 tablet by mouth 2 (Two) Times a Day With Meals.  Dispense: 60 tablet; Refill: 0    We will check patient's BMP today as we started lisinopril recently.  She is also on meloxicam that can alter her kidney function so we will monitor for that reason as well.  Patient to follow-up as needed    I attest that I have reviewed the student note and that the components of the history of the present illness, the physical exam, and the assessment and plan documented were performed by me or were performed in my presence by the student and verified by me.    Follow Up:   Return in about 6 months (around 8/22/2022).    Curt Sewell DO  [facility]   "

## 2022-02-28 NOTE — PROGRESS NOTES
"Patient called office and states her blood pressure was over 180 systolic and over 100 diastolic.  I advised patient to come back into the office and bring her blood pressure cuff.  I rechecked patient's blood pressure with result of 158/78 using both the office blood pressure cuff and her blood pressure cuff.  Patient is very anxious and states she does not want to \"stroke out\".  She continues to be asymptomatic.  She is in no acute distress and having no chest pains or shortness of breath.  I reassured patient and recommended that she consider trying a medication to help with her anxiety.  Patient was appreciative and agreeable with plan of care.  " well developed, well nourished , in no acute distress , ambulating without difficulty , normal communication ability

## 2022-03-03 ENCOUNTER — HOSPITAL ENCOUNTER (OUTPATIENT)
Dept: MRI IMAGING | Facility: HOSPITAL | Age: 67
Discharge: HOME | End: 2022-03-03
Admitting: PHYSICIAN ASSISTANT

## 2022-03-03 DIAGNOSIS — M70.50 PES ANSERINE BURSITIS: ICD-10-CM

## 2022-03-03 DIAGNOSIS — M17.11 PRIMARY OSTEOARTHRITIS OF RIGHT KNEE: ICD-10-CM

## 2022-03-03 PROCEDURE — 73721 MRI JNT OF LWR EXTRE W/O DYE: CPT

## 2022-03-07 ENCOUNTER — TELEPHONE (OUTPATIENT)
Dept: ORTHOPEDIC SURGERY | Facility: CLINIC | Age: 67
End: 2022-03-07

## 2022-03-07 NOTE — TELEPHONE ENCOUNTER
CALLED PT TO SCHEDULE MRI FOLLOW UP WITH MARIA A ARCHER TOMORROW 3/7/2022.  LVM AND ASKED TO CALL BACK TO SCHEDULE.

## 2022-03-08 ENCOUNTER — OFFICE VISIT (OUTPATIENT)
Dept: ORTHOPEDIC SURGERY | Facility: CLINIC | Age: 67
End: 2022-03-08

## 2022-03-08 VITALS
WEIGHT: 181 LBS | HEIGHT: 69 IN | DIASTOLIC BLOOD PRESSURE: 78 MMHG | BODY MASS INDEX: 26.81 KG/M2 | SYSTOLIC BLOOD PRESSURE: 128 MMHG

## 2022-03-08 DIAGNOSIS — M17.11 PRIMARY OSTEOARTHRITIS OF RIGHT KNEE: Primary | ICD-10-CM

## 2022-03-08 PROCEDURE — 99214 OFFICE O/P EST MOD 30 MIN: CPT | Performed by: ORTHOPAEDIC SURGERY

## 2022-03-08 NOTE — PROGRESS NOTES
Orthopaedic Clinic Note: Knee Established Patient    Chief Complaint   Patient presents with   • Right Knee - Follow-up     After MRI 2022        HPI    It has been 1  month(s) since Ms. Ladd's last visit. She returns to clinic today for follow-up MRI of the right knee.  She underwent MRI of the right knee from on 3/3/2022.  She is here today to discuss results of her MRI.  She continues to have diffuse pain throughout the knee and that she rates a 7/10 on the pain scale.  She is ambulating with no assistive device.  Denies fever chills or constitutional symptoms.  Overall she is doing slightly better but still having limitations daily activities.    Past Medical History:   Diagnosis Date   • Acute cystitis    • Acute pharyngitis    • Body aches    • Cancer (HCC)     skin    • Cellulitis    • Dysuria    • H/O bladder infections     2 SINCE AUGUST, urinary tract infection   • History of prolapse of bladder      Bladder disorder   • Hypertension    • Impacted cerumen     Impacted cerumen, unspecified ear    • Infected insect bite of forearm    • Menopause    • Prophylactic immunotherapy    • Separation of AC joint 2006    Right Shoulder      Past Surgical History:   Procedure Laterality Date   • CARPAL TUNNEL RELEASE     • KNEE SURGERY        Family History   Problem Relation Age of Onset   • Coronary artery disease Other    • Coronary artery disease Mother    • Hypertension Mother    • Dementia Mother    • No Known Problems Father    • Heart disease Maternal Grandmother    • Breast cancer Neg Hx    • Ovarian cancer Neg Hx      Social History     Socioeconomic History   • Marital status:    Tobacco Use   • Smoking status: Former Smoker     Packs/day: 0.25     Years: 14.00     Pack years: 3.50     Types: Cigarettes     Quit date: 1998     Years since quittin.1   • Smokeless tobacco: Never Used   Vaping Use   • Vaping Use: Never used   Substance and Sexual Activity   • Alcohol use: Yes      "Comment: Occasional alcohol use    • Drug use: No   • Sexual activity: Not Currently     Birth control/protection: None      Current Outpatient Medications on File Prior to Visit   Medication Sig Dispense Refill   • acetaminophen (TYLENOL) 500 MG tablet Take 500 mg by mouth Every 6 (Six) Hours As Needed for Mild Pain .     • amLODIPine (NORVASC) 5 MG tablet Take 1 tablet by mouth Daily. 90 tablet 2   • estradiol (ESTRACE VAGINAL) 0.1 MG/GM vaginal cream Insert 1 gm intravaginally 3 times each week for 3 weeks and then weekly thereafter 1 each 3   • lisinopril (PRINIVIL,ZESTRIL) 5 MG tablet Take 1 tablet by mouth Daily. 90 tablet 2   • MELOXICAM PO Take  by mouth.     • Misc. Devices (Ring Pessary/Support) misc 1 each Daily. #6 ring with support 1 each 0   • naproxen (Naprosyn) 500 MG tablet Take 1 tablet by mouth 2 (Two) Times a Day With Meals. 60 tablet 0   • [DISCONTINUED] hydroCHLOROthiazide (HYDRODIURIL) 12.5 MG tablet Take 1 tablet by mouth Daily. 30 tablet 2     No current facility-administered medications on file prior to visit.      No Known Allergies     Review of Systems   Constitutional: Negative.    HENT: Negative.    Eyes: Negative.    Respiratory: Negative.    Cardiovascular: Negative.    Gastrointestinal: Negative.    Endocrine: Negative.    Genitourinary: Negative.    Musculoskeletal: Positive for arthralgias.   Skin: Negative.    Allergic/Immunologic: Negative.    Neurological: Negative.    Hematological: Negative.    Psychiatric/Behavioral: Negative.         The patient's Review of Systems was personally reviewed and confirmed as accurate.    Physical Exam  Blood pressure 128/78, height 175.3 cm (69.02\"), weight 82.1 kg (181 lb), not currently breastfeeding.    Body mass index is 26.72 kg/m².    GENERAL APPEARANCE: awake, alert, oriented, in no acute distress and well developed, well nourished  LUNGS:  breathing nonlabored  EXTREMITIES: no clubbing, cyanosis  PERIPHERAL PULSES: palpable dorsalis " pedis and posterior tibial pulses bilaterally.    GAIT:  Antalgic        ----------  Right Knee Exam:  ----------  ALIGNMENT: neutral  ----------  RANGE OF MOTION:  Normal (0-120 degrees) with no extensor lag or flexion contracture  LIGAMENTOUS STABILITY:   stable to varus and valgus stress at terminal extension and 30 degrees without any evidence of laxity  ----------  STRENGTH:  KNEE FLEXION 5/5  KNEE EXTENSION  5/5  ANKLE DORSIFLEXION  5/5  ANKLE PLANTARFLEXION  5/5  ----------  PAIN WITH PALPATION:pes bursa, anterior knee, medial joint line  KNEE EFFUSION: no  PAIN WITH KNEE ROM: yes anteriorly  PATELLAR CREPITUS:  yes, painful and symptomatic  ----------  SENSATION TO LIGHT TOUCH:  DEEP PERONEAL/SUPERFICIAL PERONEAL/SURAL/SAPHENOUS/TIBIAL:    intact  ----------  EDEMA:  no  ERYTHEMA:    no  WOUNDS/INCISIONS:   no  _____________________________________________________________________  _____________________________________________________________________    RADIOGRAPHIC FINDINGS:   MRI of the right knee from 3/3/2022 is personally interpreted.  MRI shows evidence of degenerative complex tear of the posterior horn of the medial meniscus with significant full-thickness cartilage loss within the medial compartment and subchondral edema    Assessment/Plan:   Diagnosis Plan   1. Primary osteoarthritis of right knee  Large Joint Arthrocentesis     I reviewed the MRI findings with the patient.  Given her constellation of MRI findings, I do believe her knee is experiencing arthritic pain.  I do not believe she would benefit from arthroscopic intervention secondary to the subchondral edema.  I discussed alternative treatment options.  She is agreeable to viscosupplementation series.  We will obtain insurance preauthorization and initiate the injections after insurance approval.      Bradly Chavez MD  03/08/22  08:42 EST

## 2022-03-16 DIAGNOSIS — M17.12 OSTEOARTHRITIS OF LEFT KNEE, UNSPECIFIED OSTEOARTHRITIS TYPE: ICD-10-CM

## 2022-03-16 RX ORDER — NAPROXEN 500 MG/1
500 TABLET ORAL 2 TIMES DAILY WITH MEALS
Qty: 60 TABLET | Refills: 0 | Status: CANCELLED | OUTPATIENT
Start: 2022-03-16

## 2022-03-16 RX ORDER — NAPROXEN 500 MG/1
500 TABLET ORAL 2 TIMES DAILY WITH MEALS
Qty: 60 TABLET | Refills: 0 | Status: SHIPPED | OUTPATIENT
Start: 2022-03-16 | End: 2022-04-26

## 2022-03-30 ENCOUNTER — TELEPHONE (OUTPATIENT)
Dept: ORTHOPEDIC SURGERY | Facility: CLINIC | Age: 67
End: 2022-03-30

## 2022-03-30 NOTE — TELEPHONE ENCOUNTER
I spoke with the patient and advised that the auth for the gel injection has been sent the insurance and now we are just waiting on them to either approve or deny them. She verbalized understanding.    YANIRA Lujan

## 2022-03-30 NOTE — TELEPHONE ENCOUNTER
Provider: MARIA A   Caller: PRAVEENA  Phone Number: [phone]  Reason for Call: PATIENT IS CALLING TO LET US KNOW WHEN SHE REACHED OUT TO HER INSURANCE THE REQUESTING FOR THE GEL INJECTIONS HAS NOT SHOWN UP AND SHE IS ASKING US TO RESEND IT.

## 2022-04-05 ENCOUNTER — TELEPHONE (OUTPATIENT)
Dept: ORTHOPEDIC SURGERY | Facility: CLINIC | Age: 67
End: 2022-04-05

## 2022-04-05 NOTE — TELEPHONE ENCOUNTER
Provider: MARIA A   Caller: PRAVEENA  Phone Number: [phone]  Reason for Call: WE ARE WAITING ON PATIENT'S INSURANCE AUTH ON GEL INJECTIONS.  SHE IS ASKING IF WE HAVE HAD ANY RESPONSE.     PLEASE CALL WITH AN UPDATE.

## 2022-04-06 ENCOUNTER — TELEPHONE (OUTPATIENT)
Dept: FAMILY MEDICINE CLINIC | Facility: CLINIC | Age: 67
End: 2022-04-06

## 2022-04-08 ENCOUNTER — TELEPHONE (OUTPATIENT)
Dept: FAMILY MEDICINE CLINIC | Facility: CLINIC | Age: 67
End: 2022-04-08

## 2022-04-26 ENCOUNTER — OFFICE VISIT (OUTPATIENT)
Dept: FAMILY MEDICINE CLINIC | Facility: CLINIC | Age: 67
End: 2022-04-26

## 2022-04-26 ENCOUNTER — LAB (OUTPATIENT)
Dept: LAB | Facility: HOSPITAL | Age: 67
End: 2022-04-26

## 2022-04-26 VITALS
HEART RATE: 82 BPM | WEIGHT: 187.2 LBS | HEIGHT: 69 IN | DIASTOLIC BLOOD PRESSURE: 68 MMHG | SYSTOLIC BLOOD PRESSURE: 114 MMHG | OXYGEN SATURATION: 99 % | TEMPERATURE: 95.7 F | BODY MASS INDEX: 27.73 KG/M2

## 2022-04-26 DIAGNOSIS — G89.29 CHRONIC PAIN OF RIGHT KNEE: ICD-10-CM

## 2022-04-26 DIAGNOSIS — M25.561 CHRONIC PAIN OF RIGHT KNEE: ICD-10-CM

## 2022-04-26 DIAGNOSIS — N18.2 STAGE 2 CHRONIC KIDNEY DISEASE: ICD-10-CM

## 2022-04-26 DIAGNOSIS — I10 ESSENTIAL HYPERTENSION: Primary | ICD-10-CM

## 2022-04-26 LAB
ANION GAP SERPL CALCULATED.3IONS-SCNC: 13 MMOL/L (ref 5–15)
BUN SERPL-MCNC: 20 MG/DL (ref 8–23)
BUN/CREAT SERPL: 29 (ref 7–25)
CALCIUM SPEC-SCNC: 9.9 MG/DL (ref 8.6–10.5)
CHLORIDE SERPL-SCNC: 102 MMOL/L (ref 98–107)
CO2 SERPL-SCNC: 23 MMOL/L (ref 22–29)
CREAT SERPL-MCNC: 0.69 MG/DL (ref 0.57–1)
EGFRCR SERPLBLD CKD-EPI 2021: 95.3 ML/MIN/1.73
GLUCOSE SERPL-MCNC: 100 MG/DL (ref 65–99)
POTASSIUM SERPL-SCNC: 4.4 MMOL/L (ref 3.5–5.2)
SODIUM SERPL-SCNC: 138 MMOL/L (ref 136–145)

## 2022-04-26 PROCEDURE — 99214 OFFICE O/P EST MOD 30 MIN: CPT | Performed by: FAMILY MEDICINE

## 2022-04-26 PROCEDURE — 80048 BASIC METABOLIC PNL TOTAL CA: CPT | Performed by: FAMILY MEDICINE

## 2022-04-26 RX ORDER — MELOXICAM 15 MG/1
15 TABLET ORAL DAILY
Qty: 90 TABLET | Refills: 2
Start: 2022-04-26 | End: 2022-08-09 | Stop reason: SDUPTHER

## 2022-04-26 NOTE — PROGRESS NOTES
Follow Up Office Visit      Patient Name: Inez Ladd  : 1955   MRN: [id number]     Chief Complaint:    Chief Complaint   Patient presents with   • Medication Problem     Discuss medication. Concern with side effects.        History of Present Illness: Inez Ladd is a 67 y.o. female who is here today to follow up with hypertension that is well controlled.  She has arthritis in her right knee that really bothers her.  She sees orthopedic for this.  She is on meloxicam and she is concerned about the cardiovascular risk take meloxicam daily.    Today we discussed her blood pressure is very well controlled.  We discussed the relative risk of taking meloxicam as far as cardiovascular risk compared to the general population and those with heart disease.  Patient currently does not have known heart disease.  She does have hypertension and she has mildly elevated cholesterol on labs.      Review of systems was positive for knee pain    Physical exam: Mild edema noted of right knee.  She with antalgic gait    Subjective        I have reviewed and the following portions of the patient's history were updated as appropriate: past family history, past medical history, past social history, past surgical history and problem list.    Medications:     Current Outpatient Medications:   •  acetaminophen (TYLENOL) 500 MG tablet, Take 500 mg by mouth Every 6 (Six) Hours As Needed for Mild Pain ., Disp: , Rfl:   •  amLODIPine (NORVASC) 5 MG tablet, Take 1 tablet by mouth Daily., Disp: 90 tablet, Rfl: 2  •  estradiol (ESTRACE VAGINAL) 0.1 MG/GM vaginal cream, Insert 1 gm intravaginally 3 times each week for 3 weeks and then weekly thereafter, Disp: 1 each, Rfl: 3  •  lisinopril (PRINIVIL,ZESTRIL) 5 MG tablet, Take 1 tablet by mouth Daily., Disp: 90 tablet, Rfl: 2  •  Misc. Devices (Ring Pessary/Support) misc, 1 each Daily. #6 ring with support, Disp: 1 each, Rfl: 0  •  meloxicam (MOBIC) 15 MG tablet, Take 1 tablet by  "mouth Daily., Disp: 90 tablet, Rfl: 2    Allergies:   No Known Allergies    Objective     Physical Exam: Please see above  Vital Signs:   Vitals:    04/26/22 0758   BP: 114/68   Pulse: 82   Temp: 95.7 °F (35.4 °C)   SpO2: 99%   Weight: 84.9 kg (187 lb 3.2 oz)   Height: 175.3 cm (69\")   PainSc:   5   PainLoc: Knee     Body mass index is 27.64 kg/m².          Assessment / Plan      Assessment/Plan:   Diagnoses and all orders for this visit:    1. Essential hypertension (Primary)  -     Basic Metabolic Panel; Future  -     Basic Metabolic Panel    2. Chronic pain of right knee  -     meloxicam (MOBIC) 15 MG tablet; Take 1 tablet by mouth Daily.  Dispense: 90 tablet; Refill: 2    3. Stage 2 chronic kidney disease       Relative risk for take meloxicam and those with cardiovascular disease is 2.1 compared to the general population as far as cardiac adverse events.  Patient's kidney function has been stable the last 3 years.  She has no chest pain and her blood pressure is very well controlled.  We discussed the risk and benefits of take meloxicam for her knee pain and at this time I do think it is more beneficial to take Mobic for her pain because it keeps her active and improves her pain level.    To monitor her Mobic use we will check a BMP today to check for kidney function.  She does have stage II kidney disease.    Follow Up:   Return in about 6 months (around 10/26/2022).    Curt Sewell DO  [facility]   "

## 2022-08-09 ENCOUNTER — TELEPHONE (OUTPATIENT)
Dept: ORTHOPEDIC SURGERY | Facility: CLINIC | Age: 67
End: 2022-08-09

## 2022-08-09 DIAGNOSIS — M25.561 CHRONIC PAIN OF RIGHT KNEE: ICD-10-CM

## 2022-08-09 DIAGNOSIS — G89.29 CHRONIC PAIN OF RIGHT KNEE: ICD-10-CM

## 2022-08-09 RX ORDER — MELOXICAM 15 MG/1
15 TABLET ORAL DAILY
Qty: 90 TABLET | Refills: 2
Start: 2022-08-09 | End: 2022-08-17 | Stop reason: SDUPTHER

## 2022-08-09 NOTE — TELEPHONE ENCOUNTER
Rx Refill Note  Requested Prescriptions     Pending Prescriptions Disp Refills   • meloxicam (MOBIC) 15 MG tablet 90 tablet 2     Sig: Take 1 tablet by mouth Daily.      Last office visit with prescribing clinician: 4/26/2022      Next office visit with prescribing clinician: 11/1/2022            JEN POWELL MA  08/09/22, 11:17 EDT

## 2022-08-09 NOTE — TELEPHONE ENCOUNTER
15mg meloxicam has seven left would like refill since he is only in the office a few days wanted to get a hold of him ahead of time. Please send to pharmacy on file.

## 2022-08-09 NOTE — TELEPHONE ENCOUNTER
Caller: Inez Ladd    Relationship: Self    Best call back number: [phone]    Requested Prescriptions:   Requested Prescriptions     Pending Prescriptions Disp Refills   • meloxicam (MOBIC) 15 MG tablet 90 tablet 2     Sig: Take 1 tablet by mouth Daily.        Pharmacy where request should be sent: [facility]/PHARMACY #[id number] - [address] [phone] [facility] [phone] FX     Does the patient have less than a 3 day supply:  [] Yes  [x] No    Darlene Casillas Rep   08/09/22 11:12 EDT

## 2022-08-09 NOTE — TELEPHONE ENCOUNTER
I spoke with the patient and explained that another provider gave her Meloxicam in April and it has 2 refills.  She is going to call the pharmacy and check.    Mervat Sapp

## 2022-08-11 DIAGNOSIS — G89.29 CHRONIC PAIN OF RIGHT KNEE: ICD-10-CM

## 2022-08-11 DIAGNOSIS — M25.561 CHRONIC PAIN OF RIGHT KNEE: ICD-10-CM

## 2022-08-11 RX ORDER — MELOXICAM 15 MG/1
TABLET ORAL
Qty: 90 TABLET | Refills: 1 | OUTPATIENT
Start: 2022-08-11

## 2022-08-17 DIAGNOSIS — G89.29 CHRONIC PAIN OF RIGHT KNEE: ICD-10-CM

## 2022-08-17 DIAGNOSIS — M25.561 CHRONIC PAIN OF RIGHT KNEE: ICD-10-CM

## 2022-08-17 RX ORDER — MELOXICAM 15 MG/1
15 TABLET ORAL DAILY
Qty: 90 TABLET | Refills: 2
Start: 2022-08-17 | End: 2022-08-17 | Stop reason: SDUPTHER

## 2022-08-17 NOTE — TELEPHONE ENCOUNTER
Caller: Julee Inez SOL    Relationship: Self    Best call back number: [phone]    What medications are you currently taking:   Current Outpatient Medications on File Prior to Visit   Medication Sig Dispense Refill   • acetaminophen (TYLENOL) 500 MG tablet Take 500 mg by mouth Every 6 (Six) Hours As Needed for Mild Pain .     • amLODIPine (NORVASC) 5 MG tablet Take 1 tablet by mouth Daily. 90 tablet 2   • estradiol (ESTRACE VAGINAL) 0.1 MG/GM vaginal cream Insert 1 gm intravaginally 3 times each week for 3 weeks and then weekly thereafter 1 each 3   • lisinopril (PRINIVIL,ZESTRIL) 5 MG tablet Take 1 tablet by mouth Daily. 90 tablet 2   • Misc. Devices (Ring Pessary/Support) misc 1 each Daily. #6 ring with support 1 each 0   • [DISCONTINUED] hydroCHLOROthiazide (HYDRODIURIL) 12.5 MG tablet Take 1 tablet by mouth Daily. 30 tablet 2   • [DISCONTINUED] meloxicam (MOBIC) 15 MG tablet Take 1 tablet by mouth Daily. 90 tablet 2     No current facility-administered medications on file prior to visit.          When did you start taking these medications:     Which medication are you concerned about: meloxicam (MOBIC) 15 MG tablet    Who prescribed you this medication: DR. LANCASTER    What are your concerns: PHARMACY STATES THEY HAVE NOT REC'D PRESCRIPTION YOU WERE RESENDING FOR HER. SHE STATES SHE IS SORRY FOR THE ISSUE.      [facility]/pharmacy #[id number] - [place], [place] - [address] [facility] [phone] [facility] [phone] FX        How long have you had these concerns: TODAY WHEN SHE WENT TO

## 2022-08-17 NOTE — TELEPHONE ENCOUNTER
.    Caller: Inez Ladd    Relationship: Self    Best call back number: [phone]      meloxicam (MOBIC) 15 MG tablet  15 mg, Daily           Pharmacy where request should be sent: [facility]/PHARMACY #[id number] - [address] [phone] [facility] [phone]      Additional details provided by patient: PATIENT GOT THIS REFILLED AND NOW SHE HAS BEEN REPLACED THE MEDICATION; SHE WAS ASKING FOR ANOTHER REFILL      Does the patient have less than a 3 day supply:  [x] Yes  [] No    Darlene Shipley Rep   08/17/22 07:40 EDT

## 2022-08-18 RX ORDER — MELOXICAM 15 MG/1
TABLET ORAL
Qty: 90 TABLET | Refills: 1 | OUTPATIENT
Start: 2022-08-18

## 2022-08-19 RX ORDER — MELOXICAM 15 MG/1
15 TABLET ORAL DAILY
Qty: 90 TABLET | Refills: 2
Start: 2022-08-19 | End: 2022-08-22 | Stop reason: SDUPTHER

## 2022-08-19 NOTE — TELEPHONE ENCOUNTER
Rx Refill Note  Requested Prescriptions     Pending Prescriptions Disp Refills   • meloxicam (MOBIC) 15 MG tablet 90 tablet 2     Sig: Take 1 tablet by mouth Daily.      Last office visit with prescribing clinician: 4/26/2022      Next office visit with prescribing clinician: 11/1/2022            Vidhya Moore MA  08/19/22, 08:26 EDT

## 2022-08-22 ENCOUNTER — TELEPHONE (OUTPATIENT)
Dept: ORTHOPEDIC SURGERY | Facility: CLINIC | Age: 67
End: 2022-08-22

## 2022-08-22 DIAGNOSIS — M25.561 CHRONIC PAIN OF RIGHT KNEE: ICD-10-CM

## 2022-08-22 DIAGNOSIS — G89.29 CHRONIC PAIN OF RIGHT KNEE: ICD-10-CM

## 2022-08-22 RX ORDER — MELOXICAM 15 MG/1
15 TABLET ORAL DAILY
Qty: 90 TABLET | Refills: 2
Start: 2022-08-22 | End: 2022-08-23 | Stop reason: SDUPTHER

## 2022-08-22 NOTE — TELEPHONE ENCOUNTER
Pt asking for a prescription for Meloxicam. She has 2 available refill but she has lost her original fill, and Pharmacy will not honor the refill for 3 more weeks.    Please advise

## 2022-08-22 NOTE — TELEPHONE ENCOUNTER
Caller: Inez Ladd    Relationship: Self    Best call back number: [phone]    Requested Prescriptions:   Requested Prescriptions     Pending Prescriptions Disp Refills   • meloxicam (MOBIC) 15 MG tablet 90 tablet 2     Sig: Take 1 tablet by mouth Daily.        Pharmacy where request should be sent: [facility]/PHARMACY #[id number] - [address] [phone] PH - [phone]      Additional details provided by patient: PATIENT STATES THAT THE PHARMACY IS CONTINUING TO SAY THAT THEY HAVE NOT RECEIVED THE MEDICATION     Does the patient have less than a 3 day supply:  [x] Yes  [] No    Darlene Escamilla Rep   08/22/22 12:53 EDT

## 2022-08-22 NOTE — TELEPHONE ENCOUNTER
Rx Refill Note  Requested Prescriptions     Pending Prescriptions Disp Refills   • meloxicam (MOBIC) 15 MG tablet 90 tablet 2     Sig: Take 1 tablet by mouth Daily.      Last office visit with prescribing clinician: 4/26/2022      Next office visit with prescribing clinician: 11/1/2022            Karin Madrigal  08/22/22, 12:56 EDT

## 2022-08-22 NOTE — TELEPHONE ENCOUNTER
Spoke with pt who reports losing her medication bottle; I told her we could call the pharmacy to see what we could do; She understood.    After looking further into it, I realized we have not been the ones to refill her Meloxicam; Dr. Sewell and has been refilling this Rx for her.    Called pt back and LVM letting her know she would need to contact Dr. Sewell office to see if they can assist with this.    RITA Hyman

## 2022-08-23 ENCOUNTER — OFFICE VISIT (OUTPATIENT)
Dept: FAMILY MEDICINE CLINIC | Facility: CLINIC | Age: 67
End: 2022-08-23

## 2022-08-23 VITALS
TEMPERATURE: 97.7 F | DIASTOLIC BLOOD PRESSURE: 68 MMHG | WEIGHT: 178.4 LBS | HEART RATE: 82 BPM | HEIGHT: 69 IN | SYSTOLIC BLOOD PRESSURE: 126 MMHG | OXYGEN SATURATION: 98 % | BODY MASS INDEX: 26.42 KG/M2

## 2022-08-23 DIAGNOSIS — G89.29 CHRONIC PAIN OF RIGHT KNEE: ICD-10-CM

## 2022-08-23 DIAGNOSIS — M25.561 CHRONIC PAIN OF RIGHT KNEE: ICD-10-CM

## 2022-08-23 PROCEDURE — 99213 OFFICE O/P EST LOW 20 MIN: CPT | Performed by: FAMILY MEDICINE

## 2022-08-23 RX ORDER — MELOXICAM 15 MG/1
15 TABLET ORAL DAILY
Qty: 30 TABLET | Refills: 2 | Status: SHIPPED | OUTPATIENT
Start: 2022-08-23 | End: 2022-08-24 | Stop reason: SDUPTHER

## 2022-08-23 NOTE — PROGRESS NOTES
Follow Up Office Visit      Patient Name: Inez Ladd  : 1955   MRN: [id number]     Chief Complaint:    Chief Complaint   Patient presents with   • Med Refill     Needs refill on meloxicam for numbness       History of Present Illness: Inez Ladd is a 67 y.o. female who is here today to follow up with symptoms of numbness improved with meloxicam.  Patient uses meloxicam and lost her prescription.  Patient unable to  prescription without new 1.  We will have to pay out-of-pocket.    We discussed options such as Aleve or diclofenac but patient is resistant to trying other medications because of side effects.      Review of systems was positive for numbness    Physical exam: Patient's mood and affect was appropriate      Subjective        I have reviewed and the following portions of the patient's history were updated as appropriate: past family history, past medical history, past social history, past surgical history and problem list.    Medications:     Current Outpatient Medications:   •  acetaminophen (TYLENOL) 500 MG tablet, Take 500 mg by mouth Every 6 (Six) Hours As Needed for Mild Pain ., Disp: , Rfl:   •  amLODIPine (NORVASC) 5 MG tablet, Take 1 tablet by mouth Daily., Disp: 90 tablet, Rfl: 2  •  estradiol (ESTRACE VAGINAL) 0.1 MG/GM vaginal cream, Insert 1 gm intravaginally 3 times each week for 3 weeks and then weekly thereafter, Disp: 1 each, Rfl: 3  •  lisinopril (PRINIVIL,ZESTRIL) 5 MG tablet, Take 1 tablet by mouth Daily., Disp: 90 tablet, Rfl: 2  •  meloxicam (MOBIC) 15 MG tablet, Take 1 tablet by mouth Daily., Disp: 30 tablet, Rfl: 2  •  Misc. Devices (Ring Pessary/Support) misc, 1 each Daily. #6 ring with support, Disp: 1 each, Rfl: 0    Allergies:   No Known Allergies    Objective     Physical Exam: Please see above  Vital Signs:   Vitals:    22 1124   BP: 126/68   Pulse: 82   Temp: 97.7 °F (36.5 °C)   SpO2: 98%   Weight: 80.9 kg (178 lb 6.4 oz)   Height: 175.3 cm  "(69\")     Body mass index is 26.35 kg/m².          Assessment / Plan      Assessment/Plan:   Diagnoses and all orders for this visit:    1. Chronic pain of right knee  -     meloxicam (MOBIC) 15 MG tablet; Take 1 tablet by mouth Daily.  Dispense: 30 tablet; Refill: 2       Continue Mobic.  Wrote another prescription and patient can refill for the next 2 to 3 months.  Recommend good Rx coupon to make medication cheaper since insurance will not pay for these refills  Follow Up:   Return for Next scheduled follow up.    Curt Sewell DO  [facility]   "

## 2022-08-24 DIAGNOSIS — G89.29 CHRONIC PAIN OF RIGHT KNEE: ICD-10-CM

## 2022-08-24 DIAGNOSIS — M25.561 CHRONIC PAIN OF RIGHT KNEE: ICD-10-CM

## 2022-08-24 RX ORDER — MELOXICAM 15 MG/1
15 TABLET ORAL DAILY
Qty: 30 TABLET | Refills: 2 | Status: SHIPPED | OUTPATIENT
Start: 2022-08-24 | End: 2023-02-06 | Stop reason: SDUPTHER

## 2022-08-24 NOTE — TELEPHONE ENCOUNTER
Rx Refill Note  Requested Prescriptions     Pending Prescriptions Disp Refills   • meloxicam (MOBIC) 15 MG tablet 30 tablet 2     Sig: Take 1 tablet by mouth Daily.      Last office visit with prescribing clinician: 8/23/2022      Next office visit with prescribing clinician: 11/1/2022            FORREST GILMORE MA  08/24/22, 13:14 EDT

## 2022-10-25 ENCOUNTER — LAB (OUTPATIENT)
Dept: LAB | Facility: HOSPITAL | Age: 67
End: 2022-10-25

## 2022-10-25 ENCOUNTER — OFFICE VISIT (OUTPATIENT)
Dept: FAMILY MEDICINE CLINIC | Facility: CLINIC | Age: 67
End: 2022-10-25

## 2022-10-25 VITALS
TEMPERATURE: 98 F | HEART RATE: 91 BPM | SYSTOLIC BLOOD PRESSURE: 134 MMHG | HEIGHT: 69 IN | BODY MASS INDEX: 26.07 KG/M2 | WEIGHT: 176 LBS | DIASTOLIC BLOOD PRESSURE: 78 MMHG | OXYGEN SATURATION: 99 %

## 2022-10-25 DIAGNOSIS — R05.1 ACUTE COUGH: ICD-10-CM

## 2022-10-25 DIAGNOSIS — I10 ESSENTIAL HYPERTENSION: ICD-10-CM

## 2022-10-25 DIAGNOSIS — R05.1 ACUTE COUGH: Primary | ICD-10-CM

## 2022-10-25 LAB
ANION GAP SERPL CALCULATED.3IONS-SCNC: 11.3 MMOL/L (ref 5–15)
BUN SERPL-MCNC: 25 MG/DL (ref 8–23)
BUN/CREAT SERPL: 35.2 (ref 7–25)
CALCIUM SPEC-SCNC: 10.1 MG/DL (ref 8.6–10.5)
CHLORIDE SERPL-SCNC: 104 MMOL/L (ref 98–107)
CO2 SERPL-SCNC: 24.7 MMOL/L (ref 22–29)
CREAT SERPL-MCNC: 0.71 MG/DL (ref 0.57–1)
EGFRCR SERPLBLD CKD-EPI 2021: 93.3 ML/MIN/1.73
GLUCOSE SERPL-MCNC: 91 MG/DL (ref 65–99)
POTASSIUM SERPL-SCNC: 4.8 MMOL/L (ref 3.5–5.2)
SODIUM SERPL-SCNC: 140 MMOL/L (ref 136–145)

## 2022-10-25 PROCEDURE — 80048 BASIC METABOLIC PNL TOTAL CA: CPT

## 2022-10-25 PROCEDURE — 99213 OFFICE O/P EST LOW 20 MIN: CPT | Performed by: FAMILY MEDICINE

## 2022-10-25 PROCEDURE — U0004 COV-19 TEST NON-CDC HGH THRU: HCPCS

## 2022-10-25 RX ORDER — FEXOFENADINE HYDROCHLORIDE 60 MG/1
60 TABLET, FILM COATED ORAL DAILY
Qty: 30 TABLET | Refills: 0 | Status: SHIPPED | OUTPATIENT
Start: 2022-10-25 | End: 2023-03-15

## 2022-10-25 RX ORDER — ACETAMINOPHEN, DEXTROMETHORPHAN HYDROBROMIDE, AND PHENYLEPHRINE HYDROCHLORIDE 325; 10; 5 MG/1; MG/1; MG/1
2 CAPSULE, LIQUID FILLED ORAL EVERY 6 HOURS
Qty: 30 EACH | Refills: 2 | Status: SHIPPED | OUTPATIENT
Start: 2022-10-25 | End: 2023-03-15

## 2022-10-25 NOTE — PROGRESS NOTES
Follow Up Office Visit      Patient Name: Inez Ladd  : 1955   MRN: [id number]     Chief Complaint:    Chief Complaint   Patient presents with   • Cough     Started last night       History of Present Illness: Inez Ladd is a 67 y.o. female who is here today to follow up with cough that started last night. No fever. Having drainage and watery eyes. Hx of hayfever. Reports sick contacts. Still works.     Hs htn that is well ccontrolled. Used meloxicam for aches and pains.       Review of systems postive for cough      Physical exam: Patient's mood and affect is appropriate.  Lung auscultation bilaterally clear.  No lymphadenopathy in cervical region.  Oropharynx normal.        Subjective        I have reviewed and the following portions of the patient's history were updated as appropriate: past family history, past medical history, past social history, past surgical history and problem list.    Medications:     Current Outpatient Medications:   •  acetaminophen (TYLENOL) 500 MG tablet, Take 500 mg by mouth Every 6 (Six) Hours As Needed for Mild Pain ., Disp: , Rfl:   •  amLODIPine (NORVASC) 5 MG tablet, Take 1 tablet by mouth Daily., Disp: 90 tablet, Rfl: 2  •  estradiol (ESTRACE VAGINAL) 0.1 MG/GM vaginal cream, Insert 1 gm intravaginally 3 times each week for 3 weeks and then weekly thereafter, Disp: 1 each, Rfl: 3  •  lisinopril (PRINIVIL,ZESTRIL) 5 MG tablet, Take 1 tablet by mouth Daily., Disp: 90 tablet, Rfl: 2  •  meloxicam (MOBIC) 15 MG tablet, Take 1 tablet by mouth Daily., Disp: 30 tablet, Rfl: 2  •  Misc. Devices (Ring Pessary/Support) misc, 1 each Daily. #6 ring with support, Disp: 1 each, Rfl: 0  •  DM-Phenylephrine-Acetaminophen (Mucinex Fast-Max Cold & Sinus) 10-5-325 MG capsule, Take 2 capsules by mouth Every 6 (Six) Hours., Disp: 30 each, Rfl: 2  •  fexofenadine (Allegra Allergy) 60 MG tablet, Take 1 tablet by mouth Daily., Disp: 30 tablet, Rfl: 0    Allergies:   No Known  "Allergies    Objective     Physical Exam: Please see above  Vital Signs:   Vitals:    10/25/22 1031   BP: 134/78   Pulse: 91   Temp: 98 °F (36.7 °C)   SpO2: 99%   Weight: 79.8 kg (176 lb)   Height: 175.3 cm (69\")     Body mass index is 25.99 kg/m².          Assessment / Plan      Assessment/Plan:   Diagnoses and all orders for this visit:    1. Acute cough (Primary)  -     DM-Phenylephrine-Acetaminophen (Mucinex Fast-Max Cold & Sinus) 10-5-325 MG capsule; Take 2 capsules by mouth Every 6 (Six) Hours.  Dispense: 30 each; Refill: 2  -     COVID-19 PCR, [facility], NP SWAB IN [employer] VIRAL TRANSPORT MEDIA/ORAL SWISH 24-30 HR TAT - Swab, Nasopharynx; Future  -     fexofenadine (Allegra Allergy) 60 MG tablet; Take 1 tablet by mouth Daily.  Dispense: 30 tablet; Refill: 0    2. Essential hypertension  -     Basic Metabolic Panel; Future    No signs of major infection.  If symptoms worsen would send in Augmentin for patient within the next 2 weeks.  Patient to call with worsening signs or symptoms of disease.    BMP today to monitor kidney function given meloxicam and blood pressure medication use    Follow Up:   Return for Next scheduled follow up.    Curt Sewell DO  [facility] [place]   "

## 2022-10-26 ENCOUNTER — TELEPHONE (OUTPATIENT)
Dept: FAMILY MEDICINE CLINIC | Facility: CLINIC | Age: 67
End: 2022-10-26

## 2022-10-26 LAB — SARS-COV-2 RNA NOSE QL NAA+PROBE: NOT DETECTED

## 2022-10-26 NOTE — TELEPHONE ENCOUNTER
Caller: Inez Ladd    Relationship: Self    Best call back number: [phone]    What medication are you requesting: ANTIBIOTIC     Have you had these symptoms before:    [x] Yes  [] No    Have you been treated for these symptoms before:   [x] Yes  [] No    If a prescription is needed, what is your preferred pharmacy and phone number: [facility]Y #[id number] - [place], [place] - [address] [phone] [facility] [phone]      Additional notes: PATIENT STATES SHE WAS SEEN YESTERDAY BY DR LANCASTER AND IF SHE DIDN'T FEEL ANY BETTER HE WOULD SEND HER IN A ANTIBIOTIC AND SHE WOULD ALSO LIKE TO KNOW THAT RESULTS OF HER COVID TEST. PLEASE REACH OUT AND ADVISE.

## 2022-10-27 ENCOUNTER — TELEPHONE (OUTPATIENT)
Dept: FAMILY MEDICINE CLINIC | Facility: CLINIC | Age: 67
End: 2022-10-27

## 2022-10-27 DIAGNOSIS — J01.10 ACUTE NON-RECURRENT FRONTAL SINUSITIS: ICD-10-CM

## 2022-10-27 RX ORDER — AMOXICILLIN AND CLAVULANATE POTASSIUM 875; 125 MG/1; MG/1
1 TABLET, FILM COATED ORAL 2 TIMES DAILY
Qty: 14 TABLET | Refills: 0 | Status: SHIPPED | OUTPATIENT
Start: 2022-10-27 | End: 2023-01-22

## 2022-10-27 NOTE — TELEPHONE ENCOUNTER
Caller: Inez Ladd    Relationship: Self    Best call back number: [phone]    Caller requesting test results: PATIENT    What test was performed: COVID    When was the test performed: TUESDAY    Where was the test performed: OFFICE      
PATIENT CALLED BACK IN TO CHECK ON RESULTS, STILL NOT AVAILABLE. HUB  
Pt advised of negative covid result  
gait, locomotion, and balance/aerobic capacity/endurance

## 2022-11-11 DIAGNOSIS — I10 ESSENTIAL HYPERTENSION: ICD-10-CM

## 2022-11-11 RX ORDER — LISINOPRIL 5 MG/1
5 TABLET ORAL DAILY
Qty: 90 TABLET | Refills: 2 | Status: SHIPPED | OUTPATIENT
Start: 2022-11-11

## 2023-01-20 DIAGNOSIS — I10 ESSENTIAL HYPERTENSION: ICD-10-CM

## 2023-01-20 RX ORDER — AMLODIPINE BESYLATE 5 MG/1
5 TABLET ORAL DAILY
Qty: 90 TABLET | Refills: 2 | Status: SHIPPED | OUTPATIENT
Start: 2023-01-20

## 2023-01-22 PROCEDURE — 87070 CULTURE OTHR SPECIMN AEROBIC: CPT | Performed by: NURSE PRACTITIONER

## 2023-01-22 PROCEDURE — 87186 SC STD MICRODIL/AGAR DIL: CPT | Performed by: NURSE PRACTITIONER

## 2023-01-22 PROCEDURE — 87147 CULTURE TYPE IMMUNOLOGIC: CPT | Performed by: NURSE PRACTITIONER

## 2023-01-22 PROCEDURE — 87205 SMEAR GRAM STAIN: CPT | Performed by: NURSE PRACTITIONER

## 2023-02-06 DIAGNOSIS — G89.29 CHRONIC PAIN OF RIGHT KNEE: ICD-10-CM

## 2023-02-06 DIAGNOSIS — M25.561 CHRONIC PAIN OF RIGHT KNEE: ICD-10-CM

## 2023-02-06 RX ORDER — MELOXICAM 15 MG/1
15 TABLET ORAL DAILY
Qty: 30 TABLET | Refills: 2 | Status: SHIPPED | OUTPATIENT
Start: 2023-02-06

## 2023-02-06 NOTE — TELEPHONE ENCOUNTER
Rx Refill Note  Requested Prescriptions     Pending Prescriptions Disp Refills   • meloxicam (MOBIC) 15 MG tablet 30 tablet 2     Sig: Take 1 tablet by mouth Daily.      Last office visit with prescribing clinician: 10/25/2022   Last telemedicine visit with prescribing clinician: 3/15/2023   Next office visit with prescribing clinician: 3/15/2023                         Would you like a call back once the refill request has been completed: [] Yes [] No    If the office needs to give you a call back, can they leave a voicemail: [] Yes [] No    Soumya Jorge MA  02/06/23, 15:33 EST

## 2023-03-15 ENCOUNTER — OFFICE VISIT (OUTPATIENT)
Dept: FAMILY MEDICINE CLINIC | Facility: CLINIC | Age: 68
End: 2023-03-15
Payer: MEDICARE

## 2023-03-15 ENCOUNTER — LAB (OUTPATIENT)
Dept: LAB | Facility: HOSPITAL | Age: 68
End: 2023-03-15
Payer: MEDICARE

## 2023-03-15 VITALS
HEART RATE: 74 BPM | DIASTOLIC BLOOD PRESSURE: 68 MMHG | BODY MASS INDEX: 27.02 KG/M2 | TEMPERATURE: 97.7 F | SYSTOLIC BLOOD PRESSURE: 122 MMHG | HEIGHT: 69 IN | WEIGHT: 182.4 LBS | OXYGEN SATURATION: 99 %

## 2023-03-15 DIAGNOSIS — I10 ESSENTIAL HYPERTENSION: ICD-10-CM

## 2023-03-15 DIAGNOSIS — H61.23 BILATERAL IMPACTED CERUMEN: ICD-10-CM

## 2023-03-15 DIAGNOSIS — E78.41 ELEVATED LIPOPROTEIN(A): ICD-10-CM

## 2023-03-15 DIAGNOSIS — Z00.00 MEDICARE ANNUAL WELLNESS VISIT, SUBSEQUENT: Primary | ICD-10-CM

## 2023-03-15 PROCEDURE — 1159F MED LIST DOCD IN RCRD: CPT | Performed by: FAMILY MEDICINE

## 2023-03-15 PROCEDURE — 1170F FXNL STATUS ASSESSED: CPT | Performed by: FAMILY MEDICINE

## 2023-03-15 PROCEDURE — 80061 LIPID PANEL: CPT

## 2023-03-15 PROCEDURE — 80053 COMPREHEN METABOLIC PANEL: CPT

## 2023-03-15 PROCEDURE — 3078F DIAST BP <80 MM HG: CPT | Performed by: FAMILY MEDICINE

## 2023-03-15 PROCEDURE — 3074F SYST BP LT 130 MM HG: CPT | Performed by: FAMILY MEDICINE

## 2023-03-15 PROCEDURE — G0439 PPPS, SUBSEQ VISIT: HCPCS | Performed by: FAMILY MEDICINE

## 2023-03-15 NOTE — PROGRESS NOTES
The ABCs of the Annual Wellness Visit  Subsequent Medicare Wellness Visit    Subjective    Inez Ladd is a 68 y.o. female who presents for a Subsequent Medicare Wellness Visit.    The following portions of the patient's history were reviewed and   updated as appropriate: allergies, current medications, past family history, past medical history, past social history, past surgical history and problem list.    Compared to one year ago, the patient feels her physical   health is worse.    Compared to one year ago, the patient feels her mental   health is the same.    Recent Hospitalizations:  She was not admitted to hospital in last 365 days.       Current Medical Providers:  Patient Care Team:  Curt Sewell DO as PCP - General (Family Medicine)    Outpatient Medications Prior to Visit   Medication Sig Dispense Refill   • acetaminophen (TYLENOL) 500 MG tablet Take 1 tablet by mouth Every 6 (Six) Hours As Needed for Mild Pain.     • amLODIPine (NORVASC) 5 MG tablet Take 1 tablet by mouth Daily. 90 tablet 2   • estradiol (ESTRACE VAGINAL) 0.1 MG/GM vaginal cream Insert 1 gm intravaginally 3 times each week for 3 weeks and then weekly thereafter 1 each 3   • lisinopril (PRINIVIL,ZESTRIL) 5 MG tablet Take 1 tablet by mouth Daily. 90 tablet 2   • meloxicam (MOBIC) 15 MG tablet Take 1 tablet by mouth Daily. 30 tablet 2   • Misc. Devices (Ring Pessary/Support) misc 1 each Daily. #6 ring with support 1 each 0   • fexofenadine (Allegra Allergy) 60 MG tablet Take 1 tablet by mouth Daily. 30 tablet 0   • DM-Phenylephrine-Acetaminophen (Mucinex Fast-Max Cold & Sinus) 10-5-325 MG capsule Take 2 capsules by mouth Every 6 (Six) Hours. 30 each 2     No facility-administered medications prior to visit.       No opioid medication identified on active medication list. I have reviewed chart for other potential  high risk medication/s and harmful drug interactions in the elderly.          Aspirin is not on active medication list.   "Aspirin use is not indicated based on review of current medical condition/s. Risk of harm outweighs potential benefits.  .    Patient Active Problem List   Diagnosis   • Calculus of parotid gland duct   • Calculus of parotid gland duct   • Menopause present   • Essential hypertension   • Encounter for annual routine gynecological examination   • Urge incontinence   • Cystocele with uterine prolapse   • Fitting and adjustment of pessary   • Unspecified abnormal cytological findings in specimens from cervix uteri    • Impacted cerumen of left ear   • Acute pain of right knee   • Osteoarthritis of left knee     Advance Care Planning  Advance Directive is not on file.  ACP discussion was held with the patient during this visit. Patient has an advance directive (not in EMR), copy requested.     Objective    Vitals:    03/15/23 1502   BP: 122/68   Pulse: 74   Temp: 97.7 °F (36.5 °C)   SpO2: 99%   Weight: 82.7 kg (182 lb 6.4 oz)   Height: 175.3 cm (69\")     Estimated body mass index is 26.94 kg/m² as calculated from the following:    Height as of this encounter: 175.3 cm (69\").    Weight as of this encounter: 82.7 kg (182 lb 6.4 oz).    BMI is >= 25 and <30. (Overweight) The following options were offered after discussion;: weight loss educational material (shared in after visit summary) and exercise counseling/recommendations      Does the patient have evidence of cognitive impairment?   No            HEALTH RISK ASSESSMENT    Smoking Status:  Social History     Tobacco Use   Smoking Status Former   • Packs/day: 0.25   • Years: 14.00   • Pack years: 3.50   • Types: Cigarettes   • Quit date: 1998   • Years since quittin.2   Smokeless Tobacco Never     Alcohol Consumption:  Social History     Substance and Sexual Activity   Alcohol Use Yes    Comment: Occasional alcohol use      Fall Risk Screen:    STEADI Fall Risk Assessment was completed, and patient is at MODERATE risk for falls. Assessment completed " on:3/15/2023    Depression Screening:  PHQ-2/PHQ-9 Depression Screening 3/15/2023   Little Interest or Pleasure in Doing Things 0-->not at all   Feeling Down, Depressed or Hopeless 0-->not at all   PHQ-9: Brief Depression Severity Measure Score 0       Health Habits and Functional and Cognitive Screening:  Functional & Cognitive Status 3/15/2023   Do you have difficulty preparing food and eating? -   Do you have difficulty bathing yourself, getting dressed or grooming yourself? -   Do you have difficulty using the toilet? Yes   Do you have difficulty moving around from place to place? -   Do you have trouble with steps or getting out of a bed or a chair? -   Current Diet -   Dental Exam -   Eye Exam -   Exercise (times per week) -   Current Exercises Include -   Current Exercise Activities Include -   Do you need help using the phone?  -   Are you deaf or do you have serious difficulty hearing?  Yes   Do you need help with transportation? -   Do you need help shopping? -   Do you need help preparing meals?  -   Do you need help with housework?  -   Do you need help with laundry? -   Do you need help taking your medications? -   Do you need help managing money? -   Do you ever drive or ride in a car without wearing a seat belt? -   Have you felt unusual stress, anger or loneliness in the last month? -   Who do you live with? -   If you need help, do you have trouble finding someone available to you? -   Have you been bothered in the last four weeks by sexual problems? -   Do you have difficulty concentrating, remembering or making decisions? No       Age-appropriate Screening Schedule:  Refer to the list below for future screening recommendations based on patient's age, sex and/or medical conditions. Orders for these recommended tests are listed in the plan section. The patient has been provided with a written plan.    Health Maintenance   Topic Date Due   • DXA SCAN  Never done   • TDAP/TD VACCINES (1 - Tdap) Never  done   • COLORECTAL CANCER SCREENING  01/14/2021   • LIPID PANEL  03/15/2023   • ZOSTER VACCINE (1 of 2) 03/15/2023 (Originally 2/18/2005)   • COVID-19 Vaccine (3 - Booster for Moderna series) 03/17/2023 (Originally 4/7/2021)   • INFLUENZA VACCINE  03/31/2023 (Originally 8/1/2022)   • Pneumococcal Vaccine 65+ (1 - PCV) 03/15/2024 (Originally 2/18/2020)   • MAMMOGRAM  04/22/2023   • ANNUAL WELLNESS VISIT  03/15/2024   • PAP SMEAR  03/19/2024   • HEPATITIS C SCREENING  Completed                CMS Preventative Services Quick Reference  Risk Factors Identified During Encounter:    Immunizations Discussed/Encouraged: Tdap    The above risks/problems have been discussed with the patient.  Pertinent information has been shared with the patient in the After Visit Summary.    Diagnoses and all orders for this visit:    1. Medicare annual wellness visit, subsequent (Primary)    2. Bilateral impacted cerumen    3. Essential hypertension  -     Comprehensive Metabolic Panel; Future    4. Elevated lipoprotein(a)  -     Comprehensive Metabolic Panel; Future  -     Lipid Panel; Future        Follow Up:   Next Medicare Wellness visit to be scheduled in 1 year.      An After Visit Summary and PPPS were made available to the patient.

## 2023-03-16 LAB
ALBUMIN SERPL-MCNC: 4.9 G/DL (ref 3.5–5.2)
ALBUMIN/GLOB SERPL: 1.9 G/DL
ALP SERPL-CCNC: 76 U/L (ref 39–117)
ALT SERPL W P-5'-P-CCNC: 14 U/L (ref 1–33)
ANION GAP SERPL CALCULATED.3IONS-SCNC: 11.7 MMOL/L (ref 5–15)
AST SERPL-CCNC: 21 U/L (ref 1–32)
BILIRUB SERPL-MCNC: 0.5 MG/DL (ref 0–1.2)
BUN SERPL-MCNC: 23 MG/DL (ref 8–23)
BUN/CREAT SERPL: 31.1 (ref 7–25)
CALCIUM SPEC-SCNC: 10.3 MG/DL (ref 8.6–10.5)
CHLORIDE SERPL-SCNC: 101 MMOL/L (ref 98–107)
CHOLEST SERPL-MCNC: 225 MG/DL (ref 0–200)
CO2 SERPL-SCNC: 26.3 MMOL/L (ref 22–29)
CREAT SERPL-MCNC: 0.74 MG/DL (ref 0.57–1)
EGFRCR SERPLBLD CKD-EPI 2021: 88.3 ML/MIN/1.73
GLOBULIN UR ELPH-MCNC: 2.6 GM/DL
GLUCOSE SERPL-MCNC: 99 MG/DL (ref 65–99)
HDLC SERPL-MCNC: 84 MG/DL (ref 40–60)
LDLC SERPL CALC-MCNC: 127 MG/DL (ref 0–100)
LDLC/HDLC SERPL: 1.49 {RATIO}
POTASSIUM SERPL-SCNC: 4.3 MMOL/L (ref 3.5–5.2)
PROT SERPL-MCNC: 7.5 G/DL (ref 6–8.5)
SODIUM SERPL-SCNC: 139 MMOL/L (ref 136–145)
TRIGL SERPL-MCNC: 79 MG/DL (ref 0–150)
VLDLC SERPL-MCNC: 14 MG/DL (ref 5–40)

## 2023-05-08 DIAGNOSIS — G89.29 CHRONIC PAIN OF RIGHT KNEE: ICD-10-CM

## 2023-05-08 DIAGNOSIS — M25.561 CHRONIC PAIN OF RIGHT KNEE: ICD-10-CM

## 2023-05-08 RX ORDER — MELOXICAM 15 MG/1
15 TABLET ORAL DAILY
Qty: 30 TABLET | Refills: 2 | Status: SHIPPED | OUTPATIENT
Start: 2023-05-08

## 2023-05-08 NOTE — TELEPHONE ENCOUNTER
Rx Refill Note  Requested Prescriptions     Pending Prescriptions Disp Refills   • meloxicam (MOBIC) 15 MG tablet 30 tablet 2     Sig: Take 1 tablet by mouth Daily.      Last office visit with prescribing clinician: 3/15/2023   Last telemedicine visit with prescribing clinician: Visit date not found   Next office visit with prescribing clinician: 3/21/2024                         Would you like a call back once the refill request has been completed: [] Yes [] No    If the office needs to give you a call back, can they leave a voicemail: [] Yes [] No    Kristina Darling MA  05/08/23, 10:46 EDT

## 2023-07-26 ENCOUNTER — TELEPHONE (OUTPATIENT)
Dept: FAMILY MEDICINE CLINIC | Facility: CLINIC | Age: 68
End: 2023-07-26

## 2023-07-26 DIAGNOSIS — Z12.12 ENCOUNTER FOR COLORECTAL CANCER SCREENING: Primary | ICD-10-CM

## 2023-07-26 DIAGNOSIS — Z12.11 ENCOUNTER FOR COLORECTAL CANCER SCREENING: Primary | ICD-10-CM

## 2023-07-26 NOTE — TELEPHONE ENCOUNTER
PATIENT HAS CALLED REQUESTING A REFERRAL TO GET A COLONOSCOPY. PATIENT STATES SHE HAS SISTERS THAT HAVE HAD COLONOSCOPY'S DONE AND BOTH CAME BACK WITH POLYPS.    CALL BACK NUMBER [phone]    Admitted

## 2023-08-09 ENCOUNTER — TELEPHONE (OUTPATIENT)
Dept: GASTROENTEROLOGY | Facility: CLINIC | Age: 68
End: 2023-08-09

## 2023-08-09 NOTE — TELEPHONE ENCOUNTER
HUB TRANSFERRED PATIENT . SHE NEEDS TO RESCHEDULE HER APPT. TRANSFERRED TO [facility].

## 2023-08-10 DIAGNOSIS — G89.29 CHRONIC PAIN OF RIGHT KNEE: ICD-10-CM

## 2023-08-10 DIAGNOSIS — M25.561 CHRONIC PAIN OF RIGHT KNEE: ICD-10-CM

## 2023-08-10 DIAGNOSIS — I10 ESSENTIAL HYPERTENSION: ICD-10-CM

## 2023-08-10 RX ORDER — LISINOPRIL 5 MG/1
5 TABLET ORAL DAILY
Qty: 90 TABLET | Refills: 2 | Status: SHIPPED | OUTPATIENT
Start: 2023-08-10

## 2023-08-10 RX ORDER — MELOXICAM 15 MG/1
15 TABLET ORAL DAILY
Qty: 30 TABLET | Refills: 2 | Status: SHIPPED | OUTPATIENT
Start: 2023-08-10

## 2023-08-10 NOTE — TELEPHONE ENCOUNTER
Caller: Inez Ladd    Relationship: Self    Best call back number: [phone]    Requested Prescriptions:   Requested Prescriptions     Pending Prescriptions Disp Refills    meloxicam (MOBIC) 15 MG tablet 30 tablet 2     Sig: Take 1 tablet by mouth Daily.    lisinopril (PRINIVIL,ZESTRIL) 5 MG tablet 90 tablet 2     Sig: Take 1 tablet by mouth Daily.        Pharmacy where request should be sent: [facility]Y #[id number] - [address] [phone] PH - [phone] FX     Last office visit with prescribing clinician: 3/15/2023   Last telemedicine visit with prescribing clinician: Visit date not found   Next office visit with prescribing clinician: 3/21/2024     Additional details provided by patient:     Does the patient have less than a 3 day supply:  [x] Yes  [] No    Darlene Mckeon Rep   08/10/23 07:56 EDT

## 2023-09-18 ENCOUNTER — OFFICE VISIT (OUTPATIENT)
Dept: FAMILY MEDICINE CLINIC | Facility: CLINIC | Age: 68
End: 2023-09-18
Payer: MEDICARE

## 2023-09-18 VITALS
BODY MASS INDEX: 27.78 KG/M2 | SYSTOLIC BLOOD PRESSURE: 124 MMHG | DIASTOLIC BLOOD PRESSURE: 72 MMHG | TEMPERATURE: 98 F | WEIGHT: 187.6 LBS | HEART RATE: 85 BPM | OXYGEN SATURATION: 98 % | HEIGHT: 69 IN

## 2023-09-18 DIAGNOSIS — M54.50 ACUTE RIGHT-SIDED LOW BACK PAIN WITHOUT SCIATICA: Primary | ICD-10-CM

## 2023-09-18 PROCEDURE — 3078F DIAST BP <80 MM HG: CPT | Performed by: PHYSICIAN ASSISTANT

## 2023-09-18 PROCEDURE — 1160F RVW MEDS BY RX/DR IN RCRD: CPT | Performed by: PHYSICIAN ASSISTANT

## 2023-09-18 PROCEDURE — 1159F MED LIST DOCD IN RCRD: CPT | Performed by: PHYSICIAN ASSISTANT

## 2023-09-18 PROCEDURE — 3074F SYST BP LT 130 MM HG: CPT | Performed by: PHYSICIAN ASSISTANT

## 2023-09-18 PROCEDURE — 99213 OFFICE O/P EST LOW 20 MIN: CPT | Performed by: PHYSICIAN ASSISTANT

## 2023-09-18 RX ORDER — BACLOFEN 10 MG/1
10 TABLET ORAL 3 TIMES DAILY PRN
Qty: 30 TABLET | Refills: 0 | Status: SHIPPED | OUTPATIENT
Start: 2023-09-18

## 2023-09-18 NOTE — PROGRESS NOTES
Follow Up Office Visit      Date: 2023   Patient Name: Inez Ladd  : 1955   MRN: [id number]     Chief Complaint:    Chief Complaint   Patient presents with    Back Pain     LBP ride side.Patient is having spasms and is unable to stand straight. She was helping her son move boxes yesterday. Pt would like a work excuse for today.        History of Present Illness: Inez Ladd is a 68 y.o. female who is here today to follow up with low back pain.    Inez Ladd presents today with lower back pain.     The patient states that she moved boxes yesterday, 2023, and was unable to stand up. She states that the pain is in her right hip and radiates to her lower back. She states that when she stands, she is standing crooked. She denies pain radiating from her legs. However, when she ambulates, she does experience pain in the front of her leg. She states she has been taking meloxicam for over a year secondary to knee issues. She took meloxicam and Tylenol today. She states she needs a work note for today. She denies any other issues.      Subjective      Review of systems:  Review of Systems     I have reviewed and the following portions of the patient's history were updated as appropriate: past family history, past medical history, past social history, past surgical history and problem list.    Medications:     Current Outpatient Medications:     acetaminophen (TYLENOL) 500 MG tablet, Take 1 tablet by mouth Every 6 (Six) Hours As Needed for Mild Pain., Disp: , Rfl:     amLODIPine (NORVASC) 5 MG tablet, Take 1 tablet by mouth Daily., Disp: 90 tablet, Rfl: 2    estradiol (ESTRACE VAGINAL) 0.1 MG/GM vaginal cream, Insert 1 gm intravaginally 3 times each week for 3 weeks and then weekly thereafter, Disp: 1 each, Rfl: 3    lisinopril (PRINIVIL,ZESTRIL) 5 MG tablet, Take 1 tablet by mouth Daily., Disp: 90 tablet, Rfl: 2    meloxicam (MOBIC) 15 MG tablet, Take 1 tablet by mouth Daily., Disp: 30  "tablet, Rfl: 2    Misc. Devices (Ring Pessary/Support) misc, 1 each Daily. #6 ring with support, Disp: 1 each, Rfl: 0    baclofen (LIORESAL) 10 MG tablet, Take 1 tablet by mouth 3 (Three) Times a Day As Needed for Muscle Spasms., Disp: 30 tablet, Rfl: 0    predniSONE (DELTASONE) 20 MG tablet, 3 po qd x 3 days, 2 po qd x 3 days, 1 po qd x 3 days, Disp: 18 tablet, Rfl: 0    Allergies:   No Known Allergies    Objective     Vital Signs:   Vitals:    09/18/23 0900   BP: 124/72   Pulse: 85   Temp: 98 °F (36.7 °C)   TempSrc: Infrared   SpO2: 98%   Weight: 85.1 kg (187 lb 9.6 oz)   Height: 175.3 cm (69\")   PainSc:   8   PainLoc: Back     Body mass index is 27.7 kg/m².          Physical Exam:   Physical Exam  Musculoskeletal:      Lumbar back: Tenderness present.      Comments: Tenderness to palpation over the right lower lumbar paraspinal muscles. There is muscle spasm noted here. Straight leg raising on the right is negative. The range of motion of the lumbar spine has slightly decreased due to pain.        Procedures     Assessment / Plan      Assessment/Plan:   Diagnoses and all orders for this visit:    1. Acute right-sided low back pain without sciatica (Primary)  -     baclofen (LIORESAL) 10 MG tablet; Take 1 tablet by mouth 3 (Three) Times a Day As Needed for Muscle Spasms.  Dispense: 30 tablet; Refill: 0         Plan  Will continue taking meloxicam daily and baclofen three times a day as needed. Apply heat and gentle stretching to the area. Work will be excused today. Notify me if symptoms do not improve.    Follow Up:   No follow-ups on file.    Beverly Benavidez PA-C   [facility] [place] [facility] [place]    Transcribed from ambient dictation for Beverly Benavidez PA-C by Helen Mauro.  09/18/23   10:32 EDT    Patient or patient representative verbalized consent to the visit recording.  I have personally performed the services described in this document as transcribed by the above individual, and it is both accurate and " complete.

## 2023-09-20 ENCOUNTER — OFFICE VISIT (OUTPATIENT)
Dept: FAMILY MEDICINE CLINIC | Facility: CLINIC | Age: 68
End: 2023-09-20
Payer: MEDICARE

## 2023-09-20 VITALS
DIASTOLIC BLOOD PRESSURE: 60 MMHG | OXYGEN SATURATION: 98 % | BODY MASS INDEX: 27.85 KG/M2 | SYSTOLIC BLOOD PRESSURE: 104 MMHG | HEIGHT: 69 IN | HEART RATE: 76 BPM | WEIGHT: 188 LBS | TEMPERATURE: 98.4 F

## 2023-09-20 DIAGNOSIS — M54.41 ACUTE RIGHT-SIDED LOW BACK PAIN WITH RIGHT-SIDED SCIATICA: Primary | ICD-10-CM

## 2023-09-20 PROCEDURE — 1160F RVW MEDS BY RX/DR IN RCRD: CPT | Performed by: PHYSICIAN ASSISTANT

## 2023-09-20 PROCEDURE — 3078F DIAST BP <80 MM HG: CPT | Performed by: PHYSICIAN ASSISTANT

## 2023-09-20 PROCEDURE — 3074F SYST BP LT 130 MM HG: CPT | Performed by: PHYSICIAN ASSISTANT

## 2023-09-20 PROCEDURE — 1159F MED LIST DOCD IN RCRD: CPT | Performed by: PHYSICIAN ASSISTANT

## 2023-09-20 PROCEDURE — 99213 OFFICE O/P EST LOW 20 MIN: CPT | Performed by: PHYSICIAN ASSISTANT

## 2023-09-20 RX ORDER — PREDNISONE 20 MG/1
TABLET ORAL
Qty: 18 TABLET | Refills: 0 | Status: SHIPPED | OUTPATIENT
Start: 2023-09-20

## 2023-09-20 NOTE — PROGRESS NOTES
Follow Up Office Visit      Date: 2023   Patient Name: Inez Ladd  : 1955   MRN: [id number]     Chief Complaint:    Chief Complaint   Patient presents with    Hip Pain     Right side  Back pain has gone into the hip  The pain has moved into her hip down her thigh and makes her foot feel numb       History of Present Illness: Inez Ladd is a 68 y.o. female who is here today to follow up with right-sided low back pain that I did see her for a few days ago.  The significant pain in the right lumbar paraspinal muscle has improved some and she can stand straight however she now has radiating pain into the right thigh and down into the right calf with numbness in the foot.  She denies any bowel or bladder changes.  Continues to take baclofen and meloxicam.      Subjective      Review of systems:  Review of Systems     I have reviewed and the following portions of the patient's history were updated as appropriate: past family history, past medical history, past social history, past surgical history and problem list.    Medications:     Current Outpatient Medications:     acetaminophen (TYLENOL) 500 MG tablet, Take 1 tablet by mouth Every 6 (Six) Hours As Needed for Mild Pain., Disp: , Rfl:     amLODIPine (NORVASC) 5 MG tablet, Take 1 tablet by mouth Daily., Disp: 90 tablet, Rfl: 2    baclofen (LIORESAL) 10 MG tablet, Take 1 tablet by mouth 3 (Three) Times a Day As Needed for Muscle Spasms., Disp: 30 tablet, Rfl: 0    estradiol (ESTRACE VAGINAL) 0.1 MG/GM vaginal cream, Insert 1 gm intravaginally 3 times each week for 3 weeks and then weekly thereafter, Disp: 1 each, Rfl: 3    lisinopril (PRINIVIL,ZESTRIL) 5 MG tablet, Take 1 tablet by mouth Daily., Disp: 90 tablet, Rfl: 2    meloxicam (MOBIC) 15 MG tablet, Take 1 tablet by mouth Daily., Disp: 30 tablet, Rfl: 2    Misc. Devices (Ring Pessary/Support) misc, 1 each Daily. #6 ring with support, Disp: 1 each, Rfl: 0    predniSONE (DELTASONE) 20 MG  "tablet, 3 po qd x 3 days, 2 po qd x 3 days, 1 po qd x 3 days, Disp: 18 tablet, Rfl: 0    Allergies:   No Known Allergies    Objective     Vital Signs:   Vitals:    09/20/23 0906   BP: 104/60   Pulse: 76   Temp: 98.4 °F (36.9 °C)   TempSrc: Infrared   SpO2: 98%   Weight: 85.3 kg (188 lb)   Height: 175.3 cm (69\")   PainSc: 5  Comment: 5 when sitting/ when walking pain is a 7 in the thigh     Body mass index is 27.76 kg/m².          Physical Exam:   Physical Exam  Vitals and nursing note reviewed.   Constitutional:       Appearance: Normal appearance.   Musculoskeletal:      Lumbar back: Spasms and tenderness present. Positive right straight leg raise test.   Neurological:      Mental Status: She is alert.        Procedures     Assessment / Plan      Assessment/Plan:   Diagnoses and all orders for this visit:    1. Acute right-sided low back pain with right-sided sciatica (Primary)  -     predniSONE (DELTASONE) 20 MG tablet; 3 po qd x 3 days, 2 po qd x 3 days, 1 po qd x 3 days  Dispense: 18 tablet; Refill: 0    Continue baclofen.  Hold meloxicam and start prednisone taper as directed.  Gentle stretching has describes previously, heat to area.  If symptoms do not improve we should consider physical therapy.  Work note till Monday.    Follow Up:   No follow-ups on file.    Beverly Benavidez PA-C   [facility] [place]  "

## 2023-09-27 RX ORDER — SODIUM, POTASSIUM,MAG SULFATES 17.5-3.13G
2 SOLUTION, RECONSTITUTED, ORAL ORAL TAKE AS DIRECTED
Qty: 354 ML | Refills: 0 | Status: SHIPPED | OUTPATIENT
Start: 2023-09-27

## 2023-10-10 ENCOUNTER — OFFICE VISIT (OUTPATIENT)
Dept: FAMILY MEDICINE CLINIC | Facility: CLINIC | Age: 68
End: 2023-10-10
Payer: MEDICARE

## 2023-10-10 VITALS
TEMPERATURE: 98.7 F | SYSTOLIC BLOOD PRESSURE: 126 MMHG | BODY MASS INDEX: 27.99 KG/M2 | HEART RATE: 80 BPM | WEIGHT: 189 LBS | HEIGHT: 69 IN | DIASTOLIC BLOOD PRESSURE: 78 MMHG

## 2023-10-10 DIAGNOSIS — R05.9 COUGH, UNSPECIFIED TYPE: ICD-10-CM

## 2023-10-10 DIAGNOSIS — J01.00 ACUTE NON-RECURRENT MAXILLARY SINUSITIS: Primary | ICD-10-CM

## 2023-10-10 LAB
EXPIRATION DATE: NORMAL
EXPIRATION DATE: NORMAL
FLUAV AG NPH QL: NEGATIVE
FLUBV AG NPH QL: NEGATIVE
INTERNAL CONTROL: NORMAL
INTERNAL CONTROL: NORMAL
Lab: NORMAL
Lab: NORMAL
SARS-COV-2 AG UPPER RESP QL IA.RAPID: NOT DETECTED

## 2023-10-10 PROCEDURE — 87426 SARSCOV CORONAVIRUS AG IA: CPT | Performed by: FAMILY MEDICINE

## 2023-10-10 PROCEDURE — 99214 OFFICE O/P EST MOD 30 MIN: CPT | Performed by: FAMILY MEDICINE

## 2023-10-10 RX ORDER — AMOXICILLIN AND CLAVULANATE POTASSIUM 875; 125 MG/1; MG/1
1 TABLET, FILM COATED ORAL 2 TIMES DAILY
Qty: 14 TABLET | Refills: 0 | Status: SHIPPED | OUTPATIENT
Start: 2023-10-10 | End: 2023-10-17

## 2023-10-10 NOTE — PROGRESS NOTES
Subjective     Chief Complaint  Nasal Congestion and Cough (No fever. Was tested for flu and covid on Sat both were negative/Was given amox and was told she could not pick it up until 10/15)    Subjective          Inez Ladd is a 68 y.o. female who presents today to [facility] for follow up.    HPI:   Cough  This is a recurrent problem. The current episode started in the past 7 days. The cough is Productive of sputum. Associated symptoms include ear congestion, ear pain, headaches, nasal congestion, postnasal drip and rhinorrhea. Pertinent negatives include no chills, fever, sore throat, shortness of breath, sweats or wheezing. The symptoms are aggravated by pollens. She has tried rest, OTC cough suppressant, prescription cough suppressant, cool air and body position changes for the symptoms. The treatment provided mild relief. Her past medical history is significant for environmental allergies.     Flu/COVID Negative  at [facility] and negative again today   She has tired nasal sprays, antihistamine, tessalon pearls and symptoms are worsening.       The following portions of the patient's history were reviewed and updated as appropriate: allergies, current medications, past family history, past medical history, past social history, past surgical history and problem list.    Objective     Objective     Allergy:   No Known Allergies     Current Medications:   Current Outpatient Medications   Medication Sig Dispense Refill    acetaminophen (TYLENOL) 500 MG tablet Take 1 tablet by mouth Every 6 (Six) Hours As Needed for Mild Pain.      amLODIPine (NORVASC) 5 MG tablet Take 1 tablet by mouth Daily. 90 tablet 2    benzonatate (TESSALON) 200 MG capsule Take 1 capsule by mouth 3 (Three) Times a Day As Needed for Cough. 30 capsule 0    cetirizine (zyrTEC) 5 MG tablet Take 1 tablet by mouth Daily for 30 days. 30 tablet 0    ipratropium (ATROVENT) 0.03 % nasal spray 2 sprays into the  nostril(s) as directed by provider Every 12 (Twelve) Hours for 10 days. 30 mL 0    lisinopril (PRINIVIL,ZESTRIL) 5 MG tablet Take 1 tablet by mouth Daily. 90 tablet 2    meloxicam (MOBIC) 15 MG tablet Take 1 tablet by mouth Daily. 30 tablet 2    promethazine-dextromethorphan (PROMETHAZINE-DM) 6.25-15 MG/5ML syrup Take 5 mL by mouth 4 (Four) Times a Day As Needed for Cough. 118 mL 0    amoxicillin-clavulanate (AUGMENTIN) 875-125 MG per tablet Take 1 tablet by mouth 2 (Two) Times a Day for 7 days. 14 tablet 0    estradiol (ESTRACE VAGINAL) 0.1 MG/GM vaginal cream Insert 1 gm intravaginally 3 times each week for 3 weeks and then weekly thereafter (Patient not taking: Reported on 10/10/2023) 1 each 3     No current facility-administered medications for this visit.       Past Medical History:  Past Medical History:   Diagnosis Date    Acute cystitis     Acute pharyngitis     Body aches     Cancer     skin     Cellulitis     Dysuria     H/O bladder infections     2 SINCE AUGUST, urinary tract infection    History of prolapse of bladder      Bladder disorder    Hypertension     Impacted cerumen     Impacted cerumen, unspecified ear     Infected insect bite of forearm     Menopause     Prophylactic immunotherapy     Separation of AC joint 2006    Right Shoulder       Past Surgical History:  Past Surgical History:   Procedure Laterality Date    CARPAL TUNNEL RELEASE      KNEE SURGERY         Social History:  Social History     Socioeconomic History    Marital status:    Tobacco Use    Smoking status: Former     Packs/day: 0.25     Years: 14.00     Additional pack years: 0.00     Total pack years: 3.50     Types: Cigarettes     Quit date: 1998     Years since quittin.7     Passive exposure: Past    Smokeless tobacco: Never   Vaping Use    Vaping Use: Never used   Substance and Sexual Activity    Alcohol use: Yes     Comment: Occasional alcohol use     Drug use: No    Sexual activity: Not Currently     Birth  "control/protection: None       Family History:  Family History   Problem Relation Age of Onset    Coronary artery disease Other     Coronary artery disease Mother     Hypertension Mother     Dementia Mother     No Known Problems Father     Heart disease Maternal Grandmother     Breast cancer Neg Hx     Ovarian cancer Neg Hx        Review of Systems:  Review of Systems   Constitutional:  Negative for chills and fever.   HENT:  Positive for ear pain, postnasal drip and rhinorrhea. Negative for sore throat.    Respiratory:  Positive for cough. Negative for shortness of breath and wheezing.    Allergic/Immunologic: Positive for environmental allergies.   Neurological:  Positive for headaches.       Vital Signs:   /78   Pulse 80   Temp 98.7 øF (37.1 øC) (Infrared)   Ht 175.3 cm (69.02\")   Wt 85.7 kg (189 lb)   BMI 27.90 kg/mý      Physical Exam:  Physical Exam  Constitutional:       Appearance: She is not ill-appearing.   HENT:      Right Ear: Tympanic membrane normal.      Left Ear: Tenderness present. A middle ear effusion is present. Tympanic membrane is erythematous.      Nose:      Right Turbinates: Swollen.      Left Turbinates: Swollen.      Right Sinus: Maxillary sinus tenderness present.      Left Sinus: Maxillary sinus tenderness present.      Mouth/Throat:      Pharynx: Posterior oropharyngeal erythema present.   Eyes:      Pupils: Pupils are equal, round, and reactive to light.   Cardiovascular:      Rate and Rhythm: Normal rate.      Pulses: Normal pulses.   Pulmonary:      Effort: Pulmonary effort is normal.      Breath sounds: Normal breath sounds.   Neurological:      General: No focal deficit present.      Mental Status: She is alert. Mental status is at baseline.   Psychiatric:         Mood and Affect: Mood normal.         Thought Content: Thought content normal.               PHQ-9 Score  PHQ-9 Total Score:       Lab Review  Admission on 10/07/2023, Discharged on 10/07/2023   Component Date " Value Ref Range Status    COVID19 10/07/2023 Not Detected  Not Detected - Ref. Range Final    POC Influenza A, Molecular 10/07/2023 Not Detected  Negative / Not Detected Final    POC Influenza B, Molecular 10/07/2023 Not Detected  Negative / Not Detected Final    Internal Control 10/07/2023 Passed  Passed Final    Lot Number 10/07/2023 [id number]   Final    Expiration Date 10/07/2023 05/31/2024   Final        Radiology Results        Assessment / Plan         Assessment and Plan   Diagnoses and all orders for this visit:    1. Acute non-recurrent maxillary sinusitis (Primary)  Assessment & Plan:  Likely initially a viral URI with secondary bacterial infection  Due to signs and symptoms and duration of illness will start antibiotics.  Rx for Augmentin x 7 days sent to pharmacy  Discussed use of saline nasal rinses and medications as prescribed  Continue allergy medications, Tylenol/ibuprofen as needed.  If symptoms do not improve patient to return to clinic for reevaluation      Orders:  -     amoxicillin-clavulanate (AUGMENTIN) 875-125 MG per tablet; Take 1 tablet by mouth 2 (Two) Times a Day for 7 days.  Dispense: 14 tablet; Refill: 0    2. Cough, unspecified type  -     POC Influenza A / B  -     POCT VERITOR SARS-CoV-2 Antigen        Discussed possible differential diagnoses, testing, treatment, recommended non-pharmacological interventions, risks, warning signs to monitor for that would indicate need for follow-up in clinic or ER. If no improvement with these regimens or you have new or worsening symptoms follow-up. Patient verbalizes understanding and agreement with plan of care. Denies further needs or concerns.     Patient was given instructions and counseling regarding her condition and for health maintenance advised.    Health Maintenance  Health Maintenance:   Health Maintenance Due   Topic Date Due    DXA SCAN  Never done    COLORECTAL CANCER SCREENING  01/14/2021    MAMMOGRAM  04/22/2023        Meds ordered  during this visit  New Medications Ordered This Visit   Medications    amoxicillin-clavulanate (AUGMENTIN) 875-125 MG per tablet     Sig: Take 1 tablet by mouth 2 (Two) Times a Day for 7 days.     Dispense:  14 tablet     Refill:  0       Meds stopped during this visit:  Medications Discontinued During This Encounter   Medication Reason    amoxicillin (AMOXIL) 875 MG tablet         Visit Diagnoses    ICD-10-CM ICD-9-CM   1. Acute non-recurrent maxillary sinusitis  J01.00 461.0   2. Cough, unspecified type  R05.9 786.2       Patient was given instructions and counseling regarding her condition or for health maintenance advice. Please see specific information pulled into the AVS if appropriate.     Follow Up   No follow-ups on file.          This document has been electronically signed by Charity Jon DO   October 10, 2023 09:31 EDT    Dictated Utilizing Dragon Dictation: Part of this note may be an electronic transcription/translation of spoken language to printed text using the Dragon Dictation System.    Charity Jon D.O.  [facility]

## 2023-10-10 NOTE — ASSESSMENT & PLAN NOTE
Likely initially a viral URI with secondary bacterial infection  Due to signs and symptoms and duration of illness will start antibiotics.  Rx for Augmentin x 7 days sent to pharmacy  Discussed use of saline nasal rinses and medications as prescribed  Continue allergy medications, Tylenol/ibuprofen as needed.  If symptoms do not improve patient to return to clinic for reevaluation

## 2023-10-16 DIAGNOSIS — J01.00 ACUTE NON-RECURRENT MAXILLARY SINUSITIS: ICD-10-CM

## 2023-10-16 NOTE — TELEPHONE ENCOUNTER
Caller: Julee Inez SOL    Relationship: Self    Best call back number: [phone]     Requested Prescriptions:   Requested Prescriptions     Pending Prescriptions Disp Refills    amoxicillin-clavulanate (AUGMENTIN) 875-125 MG per tablet 14 tablet 0     Sig: Take 1 tablet by mouth 2 (Two) Times a Day for 7 days.        Pharmacy where request should be sent: [facility]ACY #[id number] - [address] [phone] [facility] [phone]      Last office visit with prescribing clinician: 3/15/2023   Last telemedicine visit with prescribing clinician: Visit date not found   Next office visit with prescribing clinician: 3/21/2024     Additional details provided by patient: PATIENT WAS SEEN IN OFFICE ON TUESDAY AND GIVEN ABOVE MEDICATION. SHE IS ADVISING SHE STILL HAS THE COUGH AND YELLOW MUCUS. REQUESTING A SECOND DOSE OF ANTIBIOTIC. CALL PATIENT TO ADVISE    Does the patient have less than a 3 day supply:  [x] Yes  [] No    Would you like a call back once the refill request has been completed: [] Yes [x] No    If the office needs to give you a call back, can they leave a voicemail: [] Yes [x] No    Darlene Key Rep   10/16/23 08:29 EDT

## 2023-10-17 ENCOUNTER — TELEPHONE (OUTPATIENT)
Dept: FAMILY MEDICINE CLINIC | Facility: CLINIC | Age: 68
End: 2023-10-17
Payer: MEDICARE

## 2023-10-17 RX ORDER — AMOXICILLIN AND CLAVULANATE POTASSIUM 875; 125 MG/1; MG/1
1 TABLET, FILM COATED ORAL 2 TIMES DAILY
Qty: 14 TABLET | Refills: 0 | Status: SHIPPED | OUTPATIENT
Start: 2023-10-17 | End: 2023-10-24

## 2023-10-17 NOTE — TELEPHONE ENCOUNTER
Okay for the HUB to read:      I will send this message back to Dr. Jon who saw the patient.  If Dr. Jon feels this is appropriate, she can send in another antibiotic.  Otherwise I recommended the patient give her symptoms a few more days to improve.  If she has worsening symptoms, then she should follow-up at her earliest convenience.   Dr. Jon agreed with Dr. Valero

## 2023-10-20 DIAGNOSIS — I10 ESSENTIAL HYPERTENSION: ICD-10-CM

## 2023-10-20 RX ORDER — AMLODIPINE BESYLATE 5 MG/1
5 TABLET ORAL DAILY
Qty: 90 TABLET | Refills: 2 | Status: SHIPPED | OUTPATIENT
Start: 2023-10-20

## 2023-11-10 DIAGNOSIS — M25.561 CHRONIC PAIN OF RIGHT KNEE: ICD-10-CM

## 2023-11-10 DIAGNOSIS — G89.29 CHRONIC PAIN OF RIGHT KNEE: ICD-10-CM

## 2023-11-10 RX ORDER — MELOXICAM 15 MG/1
15 TABLET ORAL DAILY
Qty: 30 TABLET | Refills: 2 | Status: SHIPPED | OUTPATIENT
Start: 2023-11-10

## 2023-12-30 PROCEDURE — 87070 CULTURE OTHR SPECIMN AEROBIC: CPT | Performed by: NURSE PRACTITIONER

## 2023-12-30 PROCEDURE — 87205 SMEAR GRAM STAIN: CPT | Performed by: NURSE PRACTITIONER

## 2023-12-30 PROCEDURE — 87637 SARSCOV2&INF A&B&RSV AMP PRB: CPT | Performed by: NURSE PRACTITIONER

## 2024-01-01 ENCOUNTER — TELEPHONE (OUTPATIENT)
Dept: URGENT CARE | Facility: CLINIC | Age: 69
End: 2024-01-01
Payer: MEDICARE

## 2024-01-02 DIAGNOSIS — J01.90 ACUTE SINUSITIS, RECURRENCE NOT SPECIFIED, UNSPECIFIED LOCATION: ICD-10-CM

## 2024-01-02 RX ORDER — AMOXICILLIN AND CLAVULANATE POTASSIUM 875; 125 MG/1; MG/1
1 TABLET, FILM COATED ORAL 2 TIMES DAILY
Qty: 6 TABLET | Refills: 0 | Status: SHIPPED | OUTPATIENT
Start: 2024-01-02

## 2024-01-02 NOTE — TELEPHONE ENCOUNTER
Caller: Inez Ladd    Relationship: Self    Best call back number: [phone]     Requested Prescriptions:   Requested Prescriptions     Pending Prescriptions Disp Refills    amoxicillin-clavulanate (AUGMENTIN) 875-125 MG per tablet 6 tablet 0     Sig: Take 1 tablet by mouth 2 (Two) Times a Day.        Pharmacy where request should be sent: [id number] - [address] [phone] [facility] [phone]      Last office visit with prescribing clinician: 3/15/2023   Last telemedicine visit with prescribing clinician: Visit date not found   Next office visit with prescribing clinician: 3/21/2024     Additional details provided by patient: PATIENT WAS SEEN IN [facility] AND PRESCRIBED THE ABOVE LISTED MEDICATION SHE IS REQUESTING A REFILL FOR ABOUT 3 TO 4 MORE DAYS    Does the patient have less than a 3 day supply:  [x] Yes  [] No    Would you like a call back once the refill request has been completed: [] Yes [x] No    If the office needs to give you a call back, can they leave a voicemail: [] Yes [x] No    Darlene Pillai Rep   01/02/24 12:43 EST

## 2024-02-05 DIAGNOSIS — M25.561 CHRONIC PAIN OF RIGHT KNEE: ICD-10-CM

## 2024-02-05 DIAGNOSIS — G89.29 CHRONIC PAIN OF RIGHT KNEE: ICD-10-CM

## 2024-02-05 NOTE — TELEPHONE ENCOUNTER
Rx Refill Note  Requested Prescriptions     Pending Prescriptions Disp Refills    meloxicam (MOBIC) 15 MG tablet 30 tablet 2     Sig: Take 1 tablet by mouth Daily.      Last office visit with prescribing clinician: 3/15/2023   Last telemedicine visit with prescribing clinician: Visit date not found   Next office visit with prescribing clinician: 3/21/2024                         Would you like a call back once the refill request has been completed: [] Yes [] No    If the office needs to give you a call back, can they leave a voicemail: [] Yes [] No    Vidhya Moore MA  02/05/24, 09:09 EST

## 2024-02-06 RX ORDER — MELOXICAM 15 MG/1
15 TABLET ORAL DAILY
Qty: 30 TABLET | Refills: 11 | Status: SHIPPED | OUTPATIENT
Start: 2024-02-06

## 2024-02-09 ENCOUNTER — TELEPHONE (OUTPATIENT)
Dept: GASTROENTEROLOGY | Facility: CLINIC | Age: 69
End: 2024-02-09
Payer: MEDICARE

## 2024-02-13 ENCOUNTER — OUTSIDE FACILITY SERVICE (OUTPATIENT)
Dept: GASTROENTEROLOGY | Facility: CLINIC | Age: 69
End: 2024-02-13
Payer: MEDICARE

## 2024-02-13 PROCEDURE — 88305 TISSUE EXAM BY PATHOLOGIST: CPT | Performed by: INTERNAL MEDICINE

## 2024-02-14 ENCOUNTER — LAB REQUISITION (OUTPATIENT)
Dept: LAB | Facility: HOSPITAL | Age: 69
End: 2024-02-14
Payer: MEDICARE

## 2024-02-14 DIAGNOSIS — Z83.719 FAMILY HISTORY OF COLON POLYPS, UNSPECIFIED: ICD-10-CM

## 2024-02-14 DIAGNOSIS — K57.30 DIVERTICULOSIS OF LARGE INTESTINE WITHOUT PERFORATION OR ABSCESS WITHOUT BLEEDING: ICD-10-CM

## 2024-02-14 DIAGNOSIS — Z12.11 ENCOUNTER FOR SCREENING FOR MALIGNANT NEOPLASM OF COLON: ICD-10-CM

## 2024-02-14 DIAGNOSIS — D12.5 BENIGN NEOPLASM OF SIGMOID COLON: ICD-10-CM

## 2024-02-15 LAB — REF LAB TEST METHOD: NORMAL

## 2024-03-04 ENCOUNTER — OFFICE VISIT (OUTPATIENT)
Dept: FAMILY MEDICINE CLINIC | Facility: CLINIC | Age: 69
End: 2024-03-04
Payer: MEDICARE

## 2024-03-04 VITALS
TEMPERATURE: 98.6 F | HEART RATE: 115 BPM | SYSTOLIC BLOOD PRESSURE: 120 MMHG | HEIGHT: 69 IN | OXYGEN SATURATION: 96 % | RESPIRATION RATE: 20 BRPM | BODY MASS INDEX: 29.12 KG/M2 | DIASTOLIC BLOOD PRESSURE: 80 MMHG | WEIGHT: 196.6 LBS

## 2024-03-04 DIAGNOSIS — H65.92 LEFT OTITIS MEDIA WITH EFFUSION: ICD-10-CM

## 2024-03-04 DIAGNOSIS — R68.89 FLU-LIKE SYMPTOMS: Primary | ICD-10-CM

## 2024-03-04 RX ORDER — AMOXICILLIN AND CLAVULANATE POTASSIUM 875; 125 MG/1; MG/1
1 TABLET, FILM COATED ORAL 2 TIMES DAILY
Qty: 20 TABLET | Refills: 0 | Status: SHIPPED | OUTPATIENT
Start: 2024-03-04 | End: 2024-03-08 | Stop reason: HOSPADM

## 2024-03-04 NOTE — PROGRESS NOTES
"Subjective   Inez Ladd is a 69 y.o. female.     History of Present Illness symptoms started Saturday morning with left eye conjunctiviits and was seen at [facility].  She was given eye drops.  She last felt like this in 2019 and she had pneumonia.  Body aches chills.  Runny nose and slight cough.  No sore throat.  She always has headaches.  No ear aches.  She is not wearing her hearing aids right now.      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  Someone had flu.    The following portions of the patient's history were reviewed and updated as appropriate: allergies, current medications, past family history, past medical history, past social history, past surgical history, and problem list.    Review of Systems   Constitutional:  Positive for chills and fatigue.   HENT:  Positive for congestion, nosebleeds, postnasal drip, rhinorrhea, sinus pressure and sinus pain.    Eyes:  Positive for redness.   Respiratory:  Positive for cough. Negative for choking, chest tightness, shortness of breath, wheezing and stridor.    Cardiovascular: Negative.  Negative for chest pain, palpitations and leg swelling.   Gastrointestinal:  Positive for diarrhea, nausea and vomiting. Negative for abdominal distention.   Endocrine: Negative.    Genitourinary: Negative.  Negative for difficulty urinating and urgency.   Musculoskeletal:  Positive for myalgias. Negative for arthralgias.   Skin: Negative.    Allergic/Immunologic: Negative.    Neurological: Negative.  Negative for light-headedness and headaches.   Hematological: Negative.  Negative for adenopathy.   Psychiatric/Behavioral: Negative.     All other systems reviewed and are negative.      Objective     Vitals:    03/04/24 0934   BP: 120/80   BP Location: Right arm   Patient Position: Sitting   Cuff Size: Adult   Pulse: 115   Resp: 20   Temp: 98.6 °F (37 °C)   TempSrc: Infrared   SpO2: 96%   Weight: 89.2 kg (196 lb 9.6 oz)   Height: 175.3 cm (69\")       Physical Exam  Vitals and nursing " note reviewed.   Constitutional:       Appearance: She is well-developed. She is ill-appearing.      Comments: Non toxic     HENT:      Head: Normocephalic and atraumatic.      Left Ear: Tympanic membrane normal.      Ears:      Comments: Right tm injected and bulging       Nose: No congestion or rhinorrhea.      Mouth/Throat:      Pharynx: No oropharyngeal exudate or posterior oropharyngeal erythema.   Eyes:      General:         Right eye: No discharge.         Left eye: No discharge.      Pupils: Pupils are equal, round, and reactive to light.   Cardiovascular:      Rate and Rhythm: Normal rate and regular rhythm.      Heart sounds: Normal heart sounds.   Pulmonary:      Effort: Pulmonary effort is normal.      Breath sounds: Normal breath sounds.   Abdominal:      General: Bowel sounds are normal.      Palpations: Abdomen is soft. There is no mass.      Tenderness: There is no abdominal tenderness.   Musculoskeletal:         General: Normal range of motion.      Right shoulder: No swelling.      Cervical back: Normal range of motion and neck supple.   Skin:     General: Skin is warm and dry.      Nails: There is no clubbing.   Neurological:      Mental Status: She is alert and oriented to person, place, and time.      Deep Tendon Reflexes: Reflexes are normal and symmetric.   Psychiatric:         Behavior: Behavior normal.         Thought Content: Thought content normal.         Judgment: Judgment normal.         Assessment & Plan     Problem List Items Addressed This Visit    None  Visit Diagnoses       Flu-like symptoms    -  Primary    Relevant Orders    POCT SARS-CoV-2 Antigen ALONSO (Completed)    POCT Influenza A/B (Completed)    Left otitis media with effusion        Relevant Medications    amoxicillin-clavulanate (AUGMENTIN) 875-125 MG per tablet          Point-of-care test COVID and flu negative today in the office.  I discussed with her   about her flu exposures and early testing can sometimes be negative  early on.  Follow-up if symptoms worsen or if she develops shortness of breath or chest pain go to the ER.  Increase fluids rest Tylenol ibuprofen

## 2024-03-05 ENCOUNTER — LAB (OUTPATIENT)
Dept: LAB | Facility: HOSPITAL | Age: 69
End: 2024-03-05
Payer: MEDICARE

## 2024-03-05 ENCOUNTER — OFFICE VISIT (OUTPATIENT)
Dept: FAMILY MEDICINE CLINIC | Facility: CLINIC | Age: 69
End: 2024-03-05
Payer: MEDICARE

## 2024-03-05 VITALS
BODY MASS INDEX: 29.03 KG/M2 | DIASTOLIC BLOOD PRESSURE: 50 MMHG | OXYGEN SATURATION: 95 % | WEIGHT: 196 LBS | HEIGHT: 69 IN | TEMPERATURE: 100.5 F | SYSTOLIC BLOOD PRESSURE: 130 MMHG | RESPIRATION RATE: 21 BRPM | HEART RATE: 117 BPM

## 2024-03-05 DIAGNOSIS — E86.0 DEHYDRATION: ICD-10-CM

## 2024-03-05 DIAGNOSIS — R05.9 COUGH, UNSPECIFIED TYPE: Primary | ICD-10-CM

## 2024-03-05 DIAGNOSIS — J11.1 FLU: ICD-10-CM

## 2024-03-05 LAB
ALBUMIN SERPL-MCNC: 4.4 G/DL (ref 3.5–5.2)
ALBUMIN/GLOB SERPL: 1.8 G/DL
ALP SERPL-CCNC: 79 U/L (ref 39–117)
ALT SERPL W P-5'-P-CCNC: 13 U/L (ref 1–33)
ANION GAP SERPL CALCULATED.3IONS-SCNC: 14.5 MMOL/L (ref 5–15)
AST SERPL-CCNC: 17 U/L (ref 1–32)
BILIRUB SERPL-MCNC: 0.3 MG/DL (ref 0–1.2)
BUN SERPL-MCNC: 18 MG/DL (ref 8–23)
BUN/CREAT SERPL: 28.1 (ref 7–25)
CALCIUM SPEC-SCNC: 8.8 MG/DL (ref 8.6–10.5)
CHLORIDE SERPL-SCNC: 95 MMOL/L (ref 98–107)
CO2 SERPL-SCNC: 22.5 MMOL/L (ref 22–29)
CREAT SERPL-MCNC: 0.64 MG/DL (ref 0.57–1)
DEPRECATED RDW RBC AUTO: 37.4 FL (ref 37–54)
EGFRCR SERPLBLD CKD-EPI 2021: 95.8 ML/MIN/1.73
ERYTHROCYTE [DISTWIDTH] IN BLOOD BY AUTOMATED COUNT: 12.2 % (ref 12.3–15.4)
EXPIRATION DATE: ABNORMAL
EXPIRATION DATE: NORMAL
EXPIRATION DATE: NORMAL
FLUAV AG NPH QL: POSITIVE
FLUBV AG NPH QL: NEGATIVE
GLOBULIN UR ELPH-MCNC: 2.4 GM/DL
GLUCOSE SERPL-MCNC: 123 MG/DL (ref 65–99)
HCT VFR BLD AUTO: 39.4 % (ref 34–46.6)
HGB BLD-MCNC: 13.1 G/DL (ref 12–15.9)
INTERNAL CONTROL: ABNORMAL
INTERNAL CONTROL: NORMAL
INTERNAL CONTROL: NORMAL
Lab: ABNORMAL
Lab: NORMAL
Lab: NORMAL
MCH RBC QN AUTO: 28.3 PG (ref 26.6–33)
MCHC RBC AUTO-ENTMCNC: 33.2 G/DL (ref 31.5–35.7)
MCV RBC AUTO: 85.1 FL (ref 79–97)
PLATELET # BLD AUTO: 200 10*3/MM3 (ref 140–450)
PMV BLD AUTO: 10.3 FL (ref 6–12)
POTASSIUM SERPL-SCNC: 3.9 MMOL/L (ref 3.5–5.2)
PROT SERPL-MCNC: 6.8 G/DL (ref 6–8.5)
RBC # BLD AUTO: 4.63 10*6/MM3 (ref 3.77–5.28)
S PYO AG THROAT QL: NEGATIVE
SARS-COV-2 AG UPPER RESP QL IA.RAPID: NOT DETECTED
SODIUM SERPL-SCNC: 132 MMOL/L (ref 136–145)
WBC NRBC COR # BLD AUTO: 6.37 10*3/MM3 (ref 3.4–10.8)

## 2024-03-05 PROCEDURE — 36415 COLL VENOUS BLD VENIPUNCTURE: CPT

## 2024-03-05 PROCEDURE — 85027 COMPLETE CBC AUTOMATED: CPT

## 2024-03-05 PROCEDURE — 80053 COMPREHEN METABOLIC PANEL: CPT

## 2024-03-05 RX ORDER — ONDANSETRON 4 MG/1
4 TABLET, ORALLY DISINTEGRATING ORAL EVERY 8 HOURS PRN
Qty: 30 TABLET | Refills: 0 | Status: SHIPPED | OUTPATIENT
Start: 2024-03-05

## 2024-03-05 RX ORDER — OSELTAMIVIR PHOSPHATE 75 MG/1
75 CAPSULE ORAL 2 TIMES DAILY
Qty: 10 CAPSULE | Refills: 0 | Status: SHIPPED | OUTPATIENT
Start: 2024-03-05 | End: 2024-03-08 | Stop reason: HOSPADM

## 2024-03-05 NOTE — PROGRESS NOTES
"Chief Complaint  No chief complaint on file.    History of Present Illness    Her worst symptom is chills and nausea. She has developed a cough. The cough brings up yellow mucus.   She has not checked for a fever as she doesn't have a thermometer. She vomited a couple times last night.   Sore throat: not particularly but irritated from cough  She denies any sob or chest pain.   She denies ear pain.   She started amoxicillin yesterday.   She has not had diarrhea.   She has not been tolerating food.   She denies any belly pain  She denies any urinary burning or new urinary symptoms.         The following portions of the patient's history were reviewed and updated as appropriate: allergies, current medications, past family history, past medical history, past social history, past surgical history, and problem list.    OBJECTIVE:  /50   Pulse 117   Temp 100.5 °F (38.1 °C) (Temporal)   Resp 21   Ht 175.3 cm (69.02\")   Wt 88.9 kg (196 lb)   SpO2 95%   BMI 28.93 kg/m²       Physical Exam  Constitutional:       General: She is not in acute distress.     Appearance: Normal appearance.   HENT:      Head: Normocephalic and atraumatic.   Eyes:      Extraocular Movements: Extraocular movements intact.   Cardiovascular:      Rate and Rhythm: Normal rate and regular rhythm.      Heart sounds: No murmur heard.  Pulmonary:      Effort: Pulmonary effort is normal. No respiratory distress.      Breath sounds: Normal breath sounds. No stridor. No wheezing, rhonchi or rales.   Skin:     Findings: No rash.   Neurological:      General: No focal deficit present.      Mental Status: She is alert.   Psychiatric:         Mood and Affect: Mood normal.                    Assessment and Plan   Diagnoses and all orders for this visit:    1. Cough, unspecified type (Primary)  -     XR Chest PA & Lateral (In Office)    2. Flu  -     CBC (No Diff); Future  -     Comprehensive metabolic panel; Future    3. Dehydration    Other orders  -  "    ondansetron ODT (ZOFRAN-ODT) 4 MG disintegrating tablet; Place 1 tablet on the tongue Every 8 (Eight) Hours As Needed for Nausea or Vomiting.  Dispense: 30 tablet; Refill: 0  -     oseltamivir (Tamiflu) 75 MG capsule; Take 1 capsule by mouth 2 (Two) Times a Day.  Dispense: 10 capsule; Refill: 0      Dehydration  Check labs    I discussed with her going to er now given she has not been able to tolerate food. She declines and would like to go home and try to drink po first. Advised her to go to the er this evening if she is not able to drink by this evening.     Flu  Check chest xray  Give tamiflu, counseled on possible abnormal thoughts, dreams, nausea on this  Advised her to obtain pulse ox and to go to er if pulse goes above 120 or o2 below 92. She has her sister watching her today and prefers to avoid going to the er if possible.         Return in about 1 day (around 3/6/2024) for with pcp .       Yesenia Hicks D.O.  [facility]

## 2024-03-06 ENCOUNTER — HOSPITAL ENCOUNTER (INPATIENT)
Facility: HOSPITAL | Age: 69
LOS: 1 days | Discharge: HOME | DRG: 641 | End: 2024-03-08
Attending: EMERGENCY MEDICINE | Admitting: INTERNAL MEDICINE
Payer: MEDICARE

## 2024-03-06 DIAGNOSIS — Z86.79 HISTORY OF HYPERTENSION: ICD-10-CM

## 2024-03-06 DIAGNOSIS — R19.7 NAUSEA VOMITING AND DIARRHEA: ICD-10-CM

## 2024-03-06 DIAGNOSIS — J10.1 INFLUENZA A: ICD-10-CM

## 2024-03-06 DIAGNOSIS — R53.81 MALAISE AND FATIGUE: ICD-10-CM

## 2024-03-06 DIAGNOSIS — R50.9 FEVER AND CHILLS: ICD-10-CM

## 2024-03-06 DIAGNOSIS — E87.1 HYPONATREMIA: Primary | ICD-10-CM

## 2024-03-06 DIAGNOSIS — R05.1 ACUTE COUGH: ICD-10-CM

## 2024-03-06 DIAGNOSIS — D70.9 NEUTROPENIA, UNSPECIFIED TYPE: ICD-10-CM

## 2024-03-06 DIAGNOSIS — R11.2 NAUSEA VOMITING AND DIARRHEA: ICD-10-CM

## 2024-03-06 DIAGNOSIS — R53.1 GENERALIZED WEAKNESS: ICD-10-CM

## 2024-03-06 DIAGNOSIS — R53.83 MALAISE AND FATIGUE: ICD-10-CM

## 2024-03-06 LAB
ALBUMIN SERPL-MCNC: 4 G/DL (ref 3.5–5.2)
ALBUMIN/GLOB SERPL: 2 G/DL
ALP SERPL-CCNC: 71 U/L (ref 39–117)
ALT SERPL W P-5'-P-CCNC: 12 U/L (ref 1–33)
ANION GAP SERPL CALCULATED.3IONS-SCNC: 10 MMOL/L (ref 5–15)
ANION GAP SERPL CALCULATED.3IONS-SCNC: 11 MMOL/L (ref 5–15)
ANION GAP SERPL CALCULATED.3IONS-SCNC: 12 MMOL/L (ref 5–15)
AST SERPL-CCNC: 20 U/L (ref 1–32)
BASOPHILS # BLD AUTO: 0 10*3/MM3 (ref 0–0.2)
BASOPHILS NFR BLD AUTO: 0 % (ref 0–1.5)
BILIRUB SERPL-MCNC: 0.6 MG/DL (ref 0–1.2)
BILIRUB UR QL STRIP: NEGATIVE
BUN BLDA-MCNC: 15 MG/DL (ref 8–26)
BUN SERPL-MCNC: 11 MG/DL (ref 8–23)
BUN SERPL-MCNC: 11 MG/DL (ref 8–23)
BUN SERPL-MCNC: 12 MG/DL (ref 8–23)
BUN SERPL-MCNC: 14 MG/DL (ref 8–23)
BUN SERPL-MCNC: 17 MG/DL (ref 8–23)
BUN/CREAT SERPL: 18 (ref 7–25)
BUN/CREAT SERPL: 19.3 (ref 7–25)
BUN/CREAT SERPL: 24 (ref 7–25)
BUN/CREAT SERPL: 32.6 (ref 7–25)
BUN/CREAT SERPL: 35.4 (ref 7–25)
CA-I BLDA-SCNC: 1.11 MMOL/L (ref 1.2–1.32)
CALCIUM SPEC-SCNC: 7.7 MG/DL (ref 8.6–10.5)
CALCIUM SPEC-SCNC: 7.9 MG/DL (ref 8.6–10.5)
CALCIUM SPEC-SCNC: 8.3 MG/DL (ref 8.6–10.5)
CALCIUM SPEC-SCNC: 8.8 MG/DL (ref 8.6–10.5)
CALCIUM SPEC-SCNC: 8.9 MG/DL (ref 8.6–10.5)
CHLORIDE BLDA-SCNC: 86 MMOL/L (ref 98–109)
CHLORIDE SERPL-SCNC: 100 MMOL/L (ref 98–107)
CHLORIDE SERPL-SCNC: 82 MMOL/L (ref 98–107)
CHLORIDE SERPL-SCNC: 87 MMOL/L (ref 98–107)
CHLORIDE SERPL-SCNC: 91 MMOL/L (ref 98–107)
CHLORIDE SERPL-SCNC: 94 MMOL/L (ref 98–107)
CLARITY UR: CLEAR
CO2 BLDA-SCNC: 25 MMOL/L (ref 24–29)
CO2 SERPL-SCNC: 20 MMOL/L (ref 22–29)
CO2 SERPL-SCNC: 21 MMOL/L (ref 22–29)
CO2 SERPL-SCNC: 23 MMOL/L (ref 22–29)
CO2 SERPL-SCNC: 23 MMOL/L (ref 22–29)
CO2 SERPL-SCNC: 24 MMOL/L (ref 22–29)
COLOR UR: YELLOW
CREAT BLDA-MCNC: 0.4 MG/DL (ref 0.6–1.3)
CREAT SERPL-MCNC: 0.43 MG/DL (ref 0.57–1)
CREAT SERPL-MCNC: 0.48 MG/DL (ref 0.57–1)
CREAT SERPL-MCNC: 0.5 MG/DL (ref 0.57–1)
CREAT SERPL-MCNC: 0.57 MG/DL (ref 0.57–1)
CREAT SERPL-MCNC: 0.61 MG/DL (ref 0.57–1)
CRP SERPL-MCNC: 5.16 MG/DL (ref 0–0.5)
D-LACTATE SERPL-SCNC: 0.9 MMOL/L (ref 0.5–2)
DEPRECATED RDW RBC AUTO: 37.2 FL (ref 37–54)
EGFRCR SERPLBLD CKD-EPI 2021: 101.7 ML/MIN/1.73
EGFRCR SERPLBLD CKD-EPI 2021: 102.7 ML/MIN/1.73
EGFRCR SERPLBLD CKD-EPI 2021: 105.4 ML/MIN/1.73
EGFRCR SERPLBLD CKD-EPI 2021: 107.3 ML/MIN/1.73
EGFRCR SERPLBLD CKD-EPI 2021: 96.9 ML/MIN/1.73
EGFRCR SERPLBLD CKD-EPI 2021: 98.5 ML/MIN/1.73
EOSINOPHIL # BLD AUTO: 0 10*3/MM3 (ref 0–0.4)
EOSINOPHIL NFR BLD AUTO: 0 % (ref 0.3–6.2)
ERYTHROCYTE [DISTWIDTH] IN BLOOD BY AUTOMATED COUNT: 11.8 % (ref 12.3–15.4)
GLOBULIN UR ELPH-MCNC: 2 GM/DL
GLUCOSE BLDC GLUCOMTR-MCNC: 124 MG/DL (ref 70–130)
GLUCOSE SERPL-MCNC: 112 MG/DL (ref 65–99)
GLUCOSE SERPL-MCNC: 113 MG/DL (ref 65–99)
GLUCOSE SERPL-MCNC: 152 MG/DL (ref 65–99)
GLUCOSE SERPL-MCNC: 96 MG/DL (ref 65–99)
GLUCOSE SERPL-MCNC: 99 MG/DL (ref 65–99)
GLUCOSE UR STRIP-MCNC: NEGATIVE MG/DL
HCT VFR BLD AUTO: 34.2 % (ref 34–46.6)
HCT VFR BLDA CALC: 33 % (ref 38–51)
HGB BLD-MCNC: 11.9 G/DL (ref 12–15.9)
HGB BLDA-MCNC: 11.2 G/DL (ref 12–17)
HGB UR QL STRIP.AUTO: NEGATIVE
HOLD SPECIMEN: NORMAL
IMM GRANULOCYTES # BLD AUTO: 0.01 10*3/MM3 (ref 0–0.05)
IMM GRANULOCYTES NFR BLD AUTO: 0.4 % (ref 0–0.5)
KETONES UR QL STRIP: ABNORMAL
LEUKOCYTE ESTERASE UR QL STRIP.AUTO: NEGATIVE
LIPASE SERPL-CCNC: 31 U/L (ref 13–60)
LYMPHOCYTES # BLD AUTO: 0.57 10*3/MM3 (ref 0.7–3.1)
LYMPHOCYTES NFR BLD AUTO: 20.7 % (ref 19.6–45.3)
MAGNESIUM SERPL-MCNC: 1.6 MG/DL (ref 1.6–2.4)
MCH RBC QN AUTO: 29.7 PG (ref 26.6–33)
MCHC RBC AUTO-ENTMCNC: 34.8 G/DL (ref 31.5–35.7)
MCV RBC AUTO: 85.3 FL (ref 79–97)
MONOCYTES # BLD AUTO: 0.26 10*3/MM3 (ref 0.1–0.9)
MONOCYTES NFR BLD AUTO: 9.5 % (ref 5–12)
NEUTROPHILS NFR BLD AUTO: 1.91 10*3/MM3 (ref 1.7–7)
NEUTROPHILS NFR BLD AUTO: 69.4 % (ref 42.7–76)
NITRITE UR QL STRIP: NEGATIVE
NRBC BLD AUTO-RTO: 0 /100 WBC (ref 0–0.2)
PH UR STRIP.AUTO: 6 [PH] (ref 5–8)
PLATELET # BLD AUTO: 162 10*3/MM3 (ref 140–450)
PMV BLD AUTO: 9.7 FL (ref 6–12)
POTASSIUM BLDA-SCNC: 3.7 MMOL/L (ref 3.5–4.9)
POTASSIUM SERPL-SCNC: 3.3 MMOL/L (ref 3.5–5.2)
POTASSIUM SERPL-SCNC: 3.5 MMOL/L (ref 3.5–5.2)
POTASSIUM SERPL-SCNC: 3.6 MMOL/L (ref 3.5–5.2)
PROCALCITONIN SERPL-MCNC: 0.08 NG/ML (ref 0–0.25)
PROT SERPL-MCNC: 6 G/DL (ref 6–8.5)
PROT UR QL STRIP: NEGATIVE
QT INTERVAL: 402 MS
QTC INTERVAL: 458 MS
RBC # BLD AUTO: 4.01 10*6/MM3 (ref 3.77–5.28)
SODIUM BLD-SCNC: 119 MMOL/L (ref 138–146)
SODIUM SERPL-SCNC: 115 MMOL/L (ref 136–145)
SODIUM SERPL-SCNC: 119 MMOL/L (ref 136–145)
SODIUM SERPL-SCNC: 124 MMOL/L (ref 136–145)
SODIUM SERPL-SCNC: 128 MMOL/L (ref 136–145)
SODIUM SERPL-SCNC: 136 MMOL/L (ref 136–145)
SP GR UR STRIP: 1.02 (ref 1–1.03)
TROPONIN T SERPL HS-MCNC: 6 NG/L
UROBILINOGEN UR QL STRIP: ABNORMAL
WBC NRBC COR # BLD AUTO: 2.75 10*3/MM3 (ref 3.4–10.8)
WHOLE BLOOD HOLD COAG: NORMAL
WHOLE BLOOD HOLD SPECIMEN: NORMAL

## 2024-03-06 PROCEDURE — 99291 CRITICAL CARE FIRST HOUR: CPT

## 2024-03-06 PROCEDURE — 25810000003 DEXTROSE-NACL PER 500 ML: Performed by: INTERNAL MEDICINE

## 2024-03-06 PROCEDURE — 81003 URINALYSIS AUTO W/O SCOPE: CPT | Performed by: EMERGENCY MEDICINE

## 2024-03-06 PROCEDURE — 93005 ELECTROCARDIOGRAM TRACING: CPT | Performed by: PHYSICIAN ASSISTANT

## 2024-03-06 PROCEDURE — 25810000003 SODIUM CHLORIDE 0.9 % SOLUTION: Performed by: PHYSICIAN ASSISTANT

## 2024-03-06 PROCEDURE — 85025 COMPLETE CBC W/AUTO DIFF WBC: CPT | Performed by: EMERGENCY MEDICINE

## 2024-03-06 PROCEDURE — 25010000002 ONDANSETRON PER 1 MG: Performed by: PHYSICIAN ASSISTANT

## 2024-03-06 PROCEDURE — 99223 1ST HOSP IP/OBS HIGH 75: CPT | Performed by: INTERNAL MEDICINE

## 2024-03-06 PROCEDURE — 83735 ASSAY OF MAGNESIUM: CPT | Performed by: PHYSICIAN ASSISTANT

## 2024-03-06 PROCEDURE — 80053 COMPREHEN METABOLIC PANEL: CPT | Performed by: EMERGENCY MEDICINE

## 2024-03-06 PROCEDURE — 80047 BASIC METABLC PNL IONIZED CA: CPT | Performed by: PHYSICIAN ASSISTANT

## 2024-03-06 PROCEDURE — 84145 PROCALCITONIN (PCT): CPT | Performed by: PHYSICIAN ASSISTANT

## 2024-03-06 PROCEDURE — 85014 HEMATOCRIT: CPT | Performed by: PHYSICIAN ASSISTANT

## 2024-03-06 PROCEDURE — 83605 ASSAY OF LACTIC ACID: CPT | Performed by: EMERGENCY MEDICINE

## 2024-03-06 PROCEDURE — 25010000002 ONDANSETRON PER 1 MG: Performed by: NURSE PRACTITIONER

## 2024-03-06 PROCEDURE — 25010000002 METOCLOPRAMIDE PER 10 MG: Performed by: PHYSICIAN ASSISTANT

## 2024-03-06 PROCEDURE — 84484 ASSAY OF TROPONIN QUANT: CPT | Performed by: PHYSICIAN ASSISTANT

## 2024-03-06 PROCEDURE — 86140 C-REACTIVE PROTEIN: CPT | Performed by: PHYSICIAN ASSISTANT

## 2024-03-06 PROCEDURE — 83690 ASSAY OF LIPASE: CPT | Performed by: EMERGENCY MEDICINE

## 2024-03-06 RX ORDER — FAMOTIDINE 10 MG/ML
20 INJECTION, SOLUTION INTRAVENOUS ONCE
Status: COMPLETED | OUTPATIENT
Start: 2024-03-06 | End: 2024-03-06

## 2024-03-06 RX ORDER — ONDANSETRON 2 MG/ML
4 INJECTION INTRAMUSCULAR; INTRAVENOUS EVERY 6 HOURS PRN
Status: DISCONTINUED | OUTPATIENT
Start: 2024-03-06 | End: 2024-03-08 | Stop reason: HOSPADM

## 2024-03-06 RX ORDER — ONDANSETRON 2 MG/ML
4 INJECTION INTRAMUSCULAR; INTRAVENOUS ONCE
Status: COMPLETED | OUTPATIENT
Start: 2024-03-06 | End: 2024-03-06

## 2024-03-06 RX ORDER — ACETAMINOPHEN 325 MG/1
650 TABLET ORAL EVERY 6 HOURS PRN
Status: DISCONTINUED | OUTPATIENT
Start: 2024-03-06 | End: 2024-03-08 | Stop reason: HOSPADM

## 2024-03-06 RX ORDER — SODIUM CHLORIDE 9 MG/ML
10 INJECTION, SOLUTION INTRAMUSCULAR; INTRAVENOUS; SUBCUTANEOUS AS NEEDED
Status: DISCONTINUED | OUTPATIENT
Start: 2024-03-06 | End: 2024-03-08 | Stop reason: HOSPADM

## 2024-03-06 RX ORDER — METOCLOPRAMIDE HYDROCHLORIDE 5 MG/ML
10 INJECTION INTRAMUSCULAR; INTRAVENOUS ONCE
Status: COMPLETED | OUTPATIENT
Start: 2024-03-06 | End: 2024-03-06

## 2024-03-06 RX ORDER — DEXTROSE AND SODIUM CHLORIDE 5; .9 G/100ML; G/100ML
75 INJECTION, SOLUTION INTRAVENOUS CONTINUOUS
Status: DISCONTINUED | OUTPATIENT
Start: 2024-03-06 | End: 2024-03-07

## 2024-03-06 RX ORDER — OSELTAMIVIR PHOSPHATE 75 MG/1
75 CAPSULE ORAL EVERY 12 HOURS SCHEDULED
Status: DISCONTINUED | OUTPATIENT
Start: 2024-03-06 | End: 2024-03-08 | Stop reason: HOSPADM

## 2024-03-06 RX ADMIN — ACETAMINOPHEN 650 MG: 325 TABLET ORAL at 17:31

## 2024-03-06 RX ADMIN — OSELTAMIVIR PHOSPHATE 75 MG: 75 CAPSULE ORAL at 21:44

## 2024-03-06 RX ADMIN — ONDANSETRON 4 MG: 2 INJECTION INTRAMUSCULAR; INTRAVENOUS at 01:47

## 2024-03-06 RX ADMIN — METOCLOPRAMIDE 10 MG: 5 INJECTION, SOLUTION INTRAMUSCULAR; INTRAVENOUS at 03:46

## 2024-03-06 RX ADMIN — SODIUM CHLORIDE 1000 ML: 9 INJECTION, SOLUTION INTRAVENOUS at 01:47

## 2024-03-06 RX ADMIN — ONDANSETRON 4 MG: 2 INJECTION INTRAMUSCULAR; INTRAVENOUS at 08:13

## 2024-03-06 RX ADMIN — OSELTAMIVIR PHOSPHATE 75 MG: 75 CAPSULE ORAL at 08:13

## 2024-03-06 RX ADMIN — DEXTROSE AND SODIUM CHLORIDE 75 ML/HR: 5; 900 INJECTION, SOLUTION INTRAVENOUS at 18:47

## 2024-03-06 RX ADMIN — FAMOTIDINE 20 MG: 10 INJECTION, SOLUTION INTRAVENOUS at 01:47

## 2024-03-06 RX ADMIN — DEXTROSE AND SODIUM CHLORIDE 75 ML/HR: 5; 900 INJECTION, SOLUTION INTRAVENOUS at 05:49

## 2024-03-06 NOTE — H&P
Intensive Care Admission Note     Hyponatremia    History of Present Illness     This is a very nice 69-year-old patient with past medical history of hypertension and a recent diagnosis of influenza A for which she was started on Tamiflu 2 days ago.  She presents to the emergency department today after progressive feelings of generalized weakness as well as not being able to keep any p.o. food or drink down for the past 48 hours.  She notes that she has had several episodes of watery diarrhea today.  She continues to have fevers and chills.  She has denied any shortness of breath, chest pain, headaches, or visual changes.  She does complain of thirst as well as a sore throat.  She denies abdominal pain but is having cramping.  She does endorse myalgias.  She did receive some Zofran in the emergency department which was somewhat helpful.  Her labs are significant for hyponatremia of 115.  She has received a liter of normal saline in the emergency department and a point-of-care showed an increase to 119.  We were consulted for consideration of admission to the intensive care unit for close monitoring secondary to her low sodium level.    Problem List, Surgical History, Family, Social History, and ROS     Hyponatremia    Influenza A    Diarrhea     Past Surgical History:   Procedure Laterality Date    CARPAL TUNNEL RELEASE      KNEE SURGERY         No Known Allergies  No current facility-administered medications on file prior to encounter.     Current Outpatient Medications on File Prior to Encounter   Medication Sig    acetaminophen (TYLENOL) 500 MG tablet Take 1 tablet by mouth Every 6 (Six) Hours As Needed for Mild Pain.    amLODIPine (NORVASC) 5 MG tablet Take 1 tablet by mouth Daily.    amoxicillin-clavulanate (AUGMENTIN) 875-125 MG per tablet Take 1 tablet by mouth 2 (Two) Times a Day.    cetirizine (zyrTEC) 5 MG tablet Take 1 tablet by mouth Daily for 30 days.    estradiol (ESTRACE VAGINAL) 0.1 MG/GM vaginal  cream Insert 1 gm intravaginally 3 times each week for 3 weeks and then weekly thereafter    ipratropium (ATROVENT) 0.03 % nasal spray 2 sprays into the nostril(s) as directed by provider Every 12 (Twelve) Hours for 10 days.    ketotifen (ZADITOR) 0.025 % ophthalmic solution Administer 1 drop into the left eye 4 (Four) Times a Day.    lisinopril (PRINIVIL,ZESTRIL) 5 MG tablet Take 1 tablet by mouth Daily.    meloxicam (MOBIC) 15 MG tablet Take 1 tablet by mouth Daily.    ondansetron ODT (ZOFRAN-ODT) 4 MG disintegrating tablet Place 1 tablet on the tongue Every 8 (Eight) Hours As Needed for Nausea or Vomiting.    oseltamivir (Tamiflu) 75 MG capsule Take 1 capsule by mouth 2 (Two) Times a Day.    trimethoprim-polymyxin b (POLYTRIM) 10296-4.1 UNIT/ML-% ophthalmic solution Administer 1 drop into the left eye Every 6 (Six) Hours for 7 days.    [DISCONTINUED] hydroCHLOROthiazide (HYDRODIURIL) 12.5 MG tablet Take 1 tablet by mouth Daily.     MEDICATION LIST AND ALLERGIES REVIEWED.    Family History   Problem Relation Age of Onset    Coronary artery disease Other     Coronary artery disease Mother     Hypertension Mother     Dementia Mother     No Known Problems Father     Heart disease Maternal Grandmother     Breast cancer Neg Hx     Ovarian cancer Neg Hx      Social History     Tobacco Use    Smoking status: Former     Current packs/day: 0.00     Average packs/day: 0.3 packs/day for 14.0 years (3.5 ttl pk-yrs)     Types: Cigarettes     Start date: 1984     Quit date: 1998     Years since quittin.1     Passive exposure: Past    Smokeless tobacco: Never   Vaping Use    Vaping status: Never Used   Substance Use Topics    Alcohol use: Yes     Comment: Occasional alcohol use     Drug use: No     Social History     Social History Narrative    Not on file     FAMILY AND SOCIAL HISTORY REVIEWED.    Review of Systems  A full review of systems has been obtained and it is negative except as mentioned expressly in  "HPI.    Physical Exam and Clinical Information   /64   Pulse 81   Temp 97.8 °F (36.6 °C) (Oral)   Resp 19   Ht 175.3 cm (69\")   Wt 88.5 kg (195 lb)   LMP  (LMP Unknown)   SpO2 95%   BMI 28.80 kg/m²   Physical Exam  Vitals reviewed.   Constitutional:       General: She is not in acute distress.     Appearance: She is ill-appearing.   HENT:      Head: Normocephalic.      Mouth/Throat:      Mouth: Mucous membranes are dry.      Pharynx: No oropharyngeal exudate.   Eyes:      General: No scleral icterus.     Pupils: Pupils are equal, round, and reactive to light.   Cardiovascular:      Rate and Rhythm: Normal rate and regular rhythm.      Pulses: Normal pulses.      Heart sounds: No murmur heard.  Pulmonary:      Effort: Pulmonary effort is normal. No respiratory distress.      Breath sounds: Normal breath sounds. No wheezing or rales.   Chest:      Chest wall: No tenderness.   Abdominal:      General: Abdomen is flat. There is no distension.      Palpations: Abdomen is soft.      Comments: Hyperactive bowel sounds.  No masses.  No tenderness to deep palpation.   Musculoskeletal:         General: Normal range of motion.      Cervical back: Normal range of motion and neck supple. No tenderness.      Right lower leg: No edema.      Left lower leg: No edema.   Skin:     General: Skin is warm and dry.      Capillary Refill: Capillary refill takes less than 2 seconds.   Neurological:      General: No focal deficit present.      Mental Status: She is alert and oriented to person, place, and time.         Results from last 7 days   Lab Units 03/06/24  0301 03/06/24  0139 03/05/24  0835   WBC 10*3/mm3  --  2.75* 6.37   HEMOGLOBIN g/dL  --  11.9* 13.1   HEMOGLOBIN, POC g/dL 11.2*  --   --    PLATELETS 10*3/mm3  --  162 200     Results from last 7 days   Lab Units 03/06/24  0301 03/06/24  0139 03/05/24  0835   SODIUM mmol/L  --  115* 132*   POTASSIUM mmol/L  --  3.5 3.9   CO2 mmol/L  --  23.0 22.5   BUN mg/dL  --  " 17 18   CREATININE mg/dL 0.40* 0.48* 0.64   MAGNESIUM mg/dL  --  1.6  --    GLUCOSE mg/dL  --  152* 123*     Estimated Creatinine Clearance: 157.4 mL/min (A) (by C-G formula based on SCr of 0.4 mg/dL (L)).          Lab Results   Component Value Date    LACTATE 0.9 03/06/2024          I reviewed the patient's results and images.     Impression     Medical Problems       Hospital Problem List       * (Principal) Hyponatremia    Influenza A    Diarrhea        Plan/Recommendations     Patient is hyponatremic and has not had much in the way of oral intake in the past several days and does continue with significant nausea, vomiting, and has started diarrhea today.  There are no neurological symptoms to suggest the need for hypertonic saline at this time.  I would like to go ahead and have her admitted to the ICU for close monitoring to correct her sodium slowly as well as to control her symptoms.  Will plan for the following:    Initiate D5 normal saline at 75 mL/h.  Every 4 hour BMP until stabilized.  Antiemesis as needed.  Will continue on with Tamiflu.  Will give her third day of therapy today as she initiated therapy on Monday.  DVT prophylaxis with heparin.    High level of risk due to:  drug(s) requiring intensive monitoring for toxicity and decision regarding hospitalization or escalation of level of hospital care.        Bradly Carlson MD, [facility]  Pulmonary and Critical Care Medicine  03/06/24 05:06 EST     CC: Curt Sewell DO

## 2024-03-06 NOTE — ED PROVIDER NOTES
Subjective   History of Present Illness  This is a 69-year-old female that presents the ER with 2-day history of flulike symptoms.  Patient reports rhinorrhea, nasal congestion, nonproductive cough.  She also has had low-grade fever of around 99.9.  She describes generalized headache, body aches, and malaise/fatigue.  She denies any chest congestion or shortness of breath.  She has had diarrhea x 2 today and she started having significant nausea today and vomited x 1.  She was seen at [facility] on 3/2/2024 and diagnosed with left conjunctivitis and prescribed polymyxin antibiotic drops.  She was then seen by PCP on 3/4/2024 for flulike symptoms and tested negative for influenza at that time but they felt like she had an acute left ear infection.  She was prescribed Augmentin for left otitis media.  She was seen again per PCP today and had outpatient labs which were reassuring, normal chest x-ray, and she tested positive for influenza A.  She was prescribed Tamiflu and Zofran.  She has not been unable to keep any p.o. or fluid intake down tonight.  She denies any abdominal pain.  She did not receive her influenza vaccine last fall, 2023.  Patient feels dehydrated and dizzy with standing.  She denies any near-syncope or syncope.  She denies any dysuria, urgency, or frequency.  She has urinated at least 5-6 times today.  Past medical history is significant for hypertension.  No other concerns at this time.    History provided by:  Patient and relative  Flu Symptoms  Presenting symptoms: cough (NP cough), diarrhea (x 2), fatigue, fever, headache, myalgias, nausea, rhinorrhea and vomiting (Emesis x 1)    Presenting symptoms: no shortness of breath and no sore throat    Onset quality:  Sudden  Duration:  2 days  Progression:  Unchanged  Chronicity:  New  Relieved by:  Nothing  Worsened by:  Nothing  Ineffective treatments:  OTC medications, prescription medications and drinking (Rx ror Tamiflu and Zofran earlier today through  PCP.)  Associated symptoms: chills, decreased appetite, decreased physical activity and nasal congestion    Associated symptoms: no ear pain, no neck stiffness and no syncope    Risk factors: being elderly and sick contacts (Pt's boss recently had the flu)        Review of Systems   Constitutional:  Positive for activity change, appetite change, chills, decreased appetite, fatigue and fever.   HENT:  Positive for congestion and rhinorrhea. Negative for ear pain, sinus pressure, sinus pain, sneezing and sore throat.    Respiratory:  Positive for cough (NP cough). Negative for shortness of breath.    Cardiovascular: Negative.  Negative for chest pain, palpitations and leg swelling.   Gastrointestinal:  Positive for diarrhea (x 2), nausea and vomiting (Emesis x 1). Negative for abdominal pain.   Genitourinary: Negative.  Negative for decreased urine volume (Pt has urinated at least 5-6 times today.), flank pain, frequency and urgency.   Musculoskeletal:  Positive for myalgias. Negative for back pain and neck stiffness.   Neurological:  Positive for dizziness (dizziness with standing. No near syncope or syncope.), weakness and headaches.   All other systems reviewed and are negative.      Past Medical History:   Diagnosis Date    Acute cystitis     Acute pharyngitis     Body aches     Cancer     skin     Cellulitis     Dysuria     H/O bladder infections     2 SINCE AUGUST, urinary tract infection    History of prolapse of bladder      Bladder disorder    Hypertension     Impacted cerumen     Impacted cerumen, unspecified ear     Infected insect bite of forearm     Menopause     Prophylactic immunotherapy     Separation of AC joint 2006    Right Shoulder       No Known Allergies    Past Surgical History:   Procedure Laterality Date    CARPAL TUNNEL RELEASE      KNEE SURGERY         Family History   Problem Relation Age of Onset    Coronary artery disease Other     Coronary artery disease Mother     Hypertension Mother      Dementia Mother     No Known Problems Father     Heart disease Maternal Grandmother     Breast cancer Neg Hx     Ovarian cancer Neg Hx        Social History     Socioeconomic History    Marital status:    Tobacco Use    Smoking status: Former     Current packs/day: 0.00     Average packs/day: 0.3 packs/day for 14.0 years (3.5 ttl pk-yrs)     Types: Cigarettes     Start date: 1984     Quit date: 1998     Years since quittin.1     Passive exposure: Past    Smokeless tobacco: Never   Vaping Use    Vaping status: Never Used   Substance and Sexual Activity    Alcohol use: Yes     Comment: Occasional alcohol use     Drug use: No    Sexual activity: Not Currently     Birth control/protection: None           Objective   Physical Exam  Vitals and nursing note reviewed.   Constitutional:       General: She is not in acute distress.     Appearance: Normal appearance. She is ill-appearing. She is not toxic-appearing or diaphoretic.      Comments: Mildly ill.  Nontoxic.  No acute distress.   HENT:      Head: Normocephalic and atraumatic.      Right Ear: Tympanic membrane normal. Decreased hearing noted. Tympanic membrane is not erythematous, retracted or bulging.      Left Ear: Tympanic membrane normal. Decreased hearing noted. Tympanic membrane is not erythematous, retracted or bulging.      Ears:      Comments: Bilateral TMs are clear.  Left TM is nonerythematous and there is no retraction or bulging.  Patient was just diagnosed with left otitis media on 3/4/2024 but I do not appreciate acute left ear infection.     Nose: Nose normal. Congestion and rhinorrhea present.      Right Sinus: No maxillary sinus tenderness or frontal sinus tenderness.      Left Sinus: No maxillary sinus tenderness or frontal sinus tenderness.      Comments: Audible nasal congestion with rhinorrhea     Mouth/Throat:      Mouth: Mucous membranes are moist.      Pharynx: Oropharynx is clear.   Eyes:      Extraocular Movements:  Extraocular movements intact.      Conjunctiva/sclera: Conjunctivae normal.      Right eye: Right conjunctiva is not injected.      Left eye: Left conjunctiva is not injected.      Pupils: Pupils are equal, round, and reactive to light.      Comments: There is no erythema or purulent drainage or sign of conjunctivitis to either eye on exam today.   Neck:      Comments: No cervical lymphadenopathy  Cardiovascular:      Rate and Rhythm: Normal rate and regular rhythm. No extrasystoles are present.     Pulses: Normal pulses.      Heart sounds: Normal heart sounds.      Comments: Regular rate and rhythm.  No ectopy.  No pedal edema to lower extremities  Pulmonary:      Effort: Pulmonary effort is normal. No tachypnea, accessory muscle usage or retractions.      Breath sounds: Normal breath sounds. No decreased breath sounds, wheezing or rhonchi.      Comments: Regular respiratory effort.  Nonproductive cough noted on exam.  Good air exchange to bilateral lung fields.  No wheezes, rhonchi, or decreased breath sounds concerning for consolidation  Abdominal:      General: Bowel sounds are normal. There is no distension.      Palpations: Abdomen is soft.      Tenderness: There is no abdominal tenderness. There is no right CVA tenderness, left CVA tenderness, guarding or rebound.      Comments: Abdomen soft and nontender.  No flank or CVA tenderness   Musculoskeletal:         General: Normal range of motion.      Cervical back: Normal range of motion and neck supple.      Right lower leg: No edema.      Left lower leg: No edema.   Lymphadenopathy:      Cervical: No cervical adenopathy.      Right cervical: No superficial, deep or posterior cervical adenopathy.     Left cervical: No superficial, deep or posterior cervical adenopathy.   Skin:     General: Skin is warm and dry.      Coloration: Skin is not pale.      Findings: No lesion or rash.      Comments: Skin is warm and patient is diaphoretic, more than likely from fever  breaking.   Neurological:      General: No focal deficit present.      Mental Status: She is alert and oriented to person, place, and time.      Cranial Nerves: Cranial nerves 2-12 are intact.      Sensory: Sensation is intact.      Motor: Motor function is intact.      Coordination: Coordination is intact.      Comments: Neuro intact and nonfocal   Psychiatric:         Mood and Affect: Affect normal. Mood is anxious.         Speech: Speech normal.         Behavior: Behavior normal. Behavior is cooperative.         Thought Content: Thought content normal.         Cognition and Memory: Cognition normal.         Judgment: Judgment normal.      Comments: Anxious on exam         Critical Care    Performed by: Varsha Velasco PA-C  Authorized by: Marizol Blandon MD    Critical care provider statement:     Critical care time (minutes):  35    Critical care time was exclusive of:  Separately billable procedures and treating other patients    Critical care was necessary to treat or prevent imminent or life-threatening deterioration of the following conditions:  Metabolic crisis (Hyponatremia with sodium level 115, nausea/vomiting/diarrhea secondary to influenza A)    Critical care was time spent personally by me on the following activities:  Blood draw for specimens, development of treatment plan with patient or surrogate, discussions with consultants, examination of patient, evaluation of patient's response to treatment, obtaining history from patient or surrogate, review of old charts, ordering and review of radiographic studies, ordering and review of laboratory studies, ordering and performing treatments and interventions, pulse oximetry and re-evaluation of patient's condition             ED Course  ED Course as of 03/06/24 0404   Wed Mar 06, 2024   0134 Patient had outpatient labs earlier this same day through PCP which were within normal limits.  Patient tested negative for strep pharyngitis, negative for COVID-19,  and positive for influenza A.  She also had an outpatient PA and lateral chest x-ray, and after reviewing that formal read, patient had some subtle bronchial wall thickening, likely viral or reactive.  No evidence of pneumonia or focal consolidation.  I advised patient and family that we were going to do IV fluids, antiemetics, and repeat some labs.  Patient's family did not understand why we needed to repeat labs because she had outpatient labs earlier this same day.  I advised that we are rechecking electrolytes with vomiting and diarrhea. [FC]   0135 O2 sat is 96% on room air.  Patient has no abdominal tenderness. [FC]   0217 Lab contacted us with significantly low sodium level of 115.  Patient actually had outpatient labs 17 hours ago and sodium level was 132.  Patient has only had 1 episode of vomiting and 2 loose stools.  Patient also had significant change in serum chloride from 95-82.  The current specimen did not appear hemolyzed.  Discussed the case with Dr. Blandon, ER attending physician.  We will perform POC Chem-8 to see if it is similar to chemistries drawn when patient got here.  Patient is not on any diuretics. [FC]   0255 Patient CBC from approximately 17 hours ago showed white blood cell count of 6000 and repeat CBC during this ER course showed white blood cell count of 2.75.  There has been significant change in the labs, and I am getting a POC Chem-8 at this time for comparison.  Upon reassessment, patient continues to report nausea after IV Zofran but she has not vomited anymore.  I will order IV Reglan. [FC]   0303 POC Chem-8 revealed sodium of 119 and potassium was 3.7, so chemistries from earlier were pretty consistent with POC chem 8 results.  I we will page hospitalist, Dr. Hahn, to discuss admission.  Discussed admission with Dr. Hahn, and due to significant hyponatremia, she said to talk to intensivist to see if patient would require hypertonic saline infusion. [FC]   0337 Discussed the case  with Dr. Carlson, Intensivist, due to significant hypokalemia.  He said that he is not going to initiate hypertonic saline, but he will watch patient closely in the ICU due to significant electrolyte abnormality.  I updated hospitalist team. [FC]      ED Course User Index  [FC] Varsha Velasco PA-C                                           Recent Results (from the past 24 hour(s))   CBC (No Diff)    Collection Time: 03/05/24  8:35 AM    Specimen: Blood   Result Value Ref Range    WBC 6.37 3.40 - 10.80 10*3/mm3    RBC 4.63 3.77 - 5.28 10*6/mm3    Hemoglobin 13.1 12.0 - 15.9 g/dL    Hematocrit 39.4 34.0 - 46.6 %    MCV 85.1 79.0 - 97.0 fL    MCH 28.3 26.6 - 33.0 pg    MCHC 33.2 31.5 - 35.7 g/dL    RDW 12.2 (L) 12.3 - 15.4 %    RDW-SD 37.4 37.0 - 54.0 fl    MPV 10.3 6.0 - 12.0 fL    Platelets 200 140 - 450 10*3/mm3   Comprehensive metabolic panel    Collection Time: 03/05/24  8:35 AM    Specimen: Blood   Result Value Ref Range    Glucose 123 (H) 65 - 99 mg/dL    BUN 18 8 - 23 mg/dL    Creatinine 0.64 0.57 - 1.00 mg/dL    Sodium 132 (L) 136 - 145 mmol/L    Potassium 3.9 3.5 - 5.2 mmol/L    Chloride 95 (L) 98 - 107 mmol/L    CO2 22.5 22.0 - 29.0 mmol/L    Calcium 8.8 8.6 - 10.5 mg/dL    Total Protein 6.8 6.0 - 8.5 g/dL    Albumin 4.4 3.5 - 5.2 g/dL    ALT (SGPT) 13 1 - 33 U/L    AST (SGOT) 17 1 - 32 U/L    Alkaline Phosphatase 79 39 - 117 U/L    Total Bilirubin 0.3 0.0 - 1.2 mg/dL    Globulin 2.4 gm/dL    A/G Ratio 1.8 g/dL    BUN/Creatinine Ratio 28.1 (H) 7.0 - 25.0    Anion Gap 14.5 5.0 - 15.0 mmol/L    eGFR 95.8 >60.0 mL/min/1.73   POCT rapid strep A    Collection Time: 03/05/24  9:20 AM    Specimen: Swab   Result Value Ref Range    Rapid Strep A Screen Negative Negative, VALID, INVALID, Not Performed    Internal Control Passed Passed    Lot Number [id number]     Expiration Date 10/1/26    POCT Influenza A/B    Collection Time: 03/05/24  9:20 AM    Specimen: Swab   Result Value Ref Range    Rapid Influenza A Ag  Positive (A) Negative    Rapid Influenza B Ag Negative Negative    Internal Control Passed Passed    Lot Number [id number]     Expiration Date 12/8/25    POCT SARS-CoV-2 Antigen ALONSO    Collection Time: 03/05/24  9:21 AM    Specimen: Swab   Result Value Ref Range    SARS Antigen Not Detected Not Detected, Presumptive Negative    Internal Control Passed Passed    Lot Number [id number]     Expiration Date 7/3/24    Comprehensive Metabolic Panel    Collection Time: 03/06/24  1:39 AM    Specimen: Blood   Result Value Ref Range    Glucose 152 (H) 65 - 99 mg/dL    BUN 17 8 - 23 mg/dL    Creatinine 0.48 (L) 0.57 - 1.00 mg/dL    Sodium 115 (C) 136 - 145 mmol/L    Potassium 3.5 3.5 - 5.2 mmol/L    Chloride 82 (L) 98 - 107 mmol/L    CO2 23.0 22.0 - 29.0 mmol/L    Calcium 7.9 (L) 8.6 - 10.5 mg/dL    Total Protein 6.0 6.0 - 8.5 g/dL    Albumin 4.0 3.5 - 5.2 g/dL    ALT (SGPT) 12 1 - 33 U/L    AST (SGOT) 20 1 - 32 U/L    Alkaline Phosphatase 71 39 - 117 U/L    Total Bilirubin 0.6 0.0 - 1.2 mg/dL    Globulin 2.0 gm/dL    A/G Ratio 2.0 g/dL    BUN/Creatinine Ratio 35.4 (H) 7.0 - 25.0    Anion Gap 10.0 5.0 - 15.0 mmol/L    eGFR 102.7 >60.0 mL/min/1.73   Lipase    Collection Time: 03/06/24  1:39 AM    Specimen: Blood   Result Value Ref Range    Lipase 31 13 - 60 U/L   Lactic Acid, Plasma    Collection Time: 03/06/24  1:39 AM    Specimen: Blood   Result Value Ref Range    Lactate 0.9 0.5 - 2.0 mmol/L   Green Top (Gel)    Collection Time: 03/06/24  1:39 AM   Result Value Ref Range    Extra Tube Hold for add-ons.    Lavender Top    Collection Time: 03/06/24  1:39 AM   Result Value Ref Range    Extra Tube hold for add-on    Gold Top - SST    Collection Time: 03/06/24  1:39 AM   Result Value Ref Range    Extra Tube Hold for add-ons.    Light Blue Top    Collection Time: 03/06/24  1:39 AM   Result Value Ref Range    Extra Tube Hold for add-ons.    CBC Auto Differential    Collection Time: 03/06/24  1:39 AM    Specimen: Blood   Result Value  Ref Range    WBC 2.75 (L) 3.40 - 10.80 10*3/mm3    RBC 4.01 3.77 - 5.28 10*6/mm3    Hemoglobin 11.9 (L) 12.0 - 15.9 g/dL    Hematocrit 34.2 34.0 - 46.6 %    MCV 85.3 79.0 - 97.0 fL    MCH 29.7 26.6 - 33.0 pg    MCHC 34.8 31.5 - 35.7 g/dL    RDW 11.8 (L) 12.3 - 15.4 %    RDW-SD 37.2 37.0 - 54.0 fl    MPV 9.7 6.0 - 12.0 fL    Platelets 162 140 - 450 10*3/mm3    Neutrophil % 69.4 42.7 - 76.0 %    Lymphocyte % 20.7 19.6 - 45.3 %    Monocyte % 9.5 5.0 - 12.0 %    Eosinophil % 0.0 (L) 0.3 - 6.2 %    Basophil % 0.0 0.0 - 1.5 %    Immature Grans % 0.4 0.0 - 0.5 %    Neutrophils, Absolute 1.91 1.70 - 7.00 10*3/mm3    Lymphocytes, Absolute 0.57 (L) 0.70 - 3.10 10*3/mm3    Monocytes, Absolute 0.26 0.10 - 0.90 10*3/mm3    Eosinophils, Absolute 0.00 0.00 - 0.40 10*3/mm3    Basophils, Absolute 0.00 0.00 - 0.20 10*3/mm3    Immature Grans, Absolute 0.01 0.00 - 0.05 10*3/mm3    nRBC 0.0 0.0 - 0.2 /100 WBC   Procalcitonin    Collection Time: 03/06/24  1:39 AM    Specimen: Blood   Result Value Ref Range    Procalcitonin 0.08 0.00 - 0.25 ng/mL   C-reactive Protein    Collection Time: 03/06/24  1:39 AM    Specimen: Blood   Result Value Ref Range    C-Reactive Protein 5.16 (H) 0.00 - 0.50 mg/dL   Magnesium    Collection Time: 03/06/24  1:39 AM    Specimen: Blood   Result Value Ref Range    Magnesium 1.6 1.6 - 2.4 mg/dL   Single High Sensitivity Troponin T    Collection Time: 03/06/24  1:39 AM    Specimen: Blood   Result Value Ref Range    HS Troponin T 6 <14 ng/L   POC Chem 8    Collection Time: 03/06/24  3:01 AM    Specimen: Blood   Result Value Ref Range    Glucose 124 70 - 130 mg/dL    BUN 15 8 - 26 mg/dL    Creatinine 0.40 (L) 0.60 - 1.30 mg/dL    Sodium 119 (L) 138 - 146 mmol/L    POC Potassium 3.7 3.5 - 4.9 mmol/L    Chloride 86 (L) 98 - 109 mmol/L    Total CO2 25 24 - 29 mmol/L    Hemoglobin 11.2 (L) 12.0 - 17.0 g/dL    Hematocrit 33 (L) 38 - 51 %    Ionized Calcium 1.11 (L) 1.20 - 1.32 mmol/L    eGFR 107.3 >60.0 mL/min/1.73   ECG  "12 Lead Electrolyte Imbalance    Collection Time: 03/06/24  3:11 AM   Result Value Ref Range    QT Interval 402 ms    QTC Interval 458 ms     Note: In addition to lab results from this visit, the labs listed above may include labs taken at another facility or during a different encounter within the last 24 hours. Please correlate lab times with ED admission and discharge times for further clarification of the services performed during this visit.    No orders to display     Vitals:    03/06/24 0107 03/06/24 0152 03/06/24 0330   BP: 121/67 118/65 119/70   BP Location: Left arm Left arm    Patient Position: Sitting Lying    Pulse: 72 75 77   Resp: 22 19    Temp: 97.8 °F (36.6 °C)     TempSrc: Oral     SpO2: 96% 95% 94%   Weight: 88.5 kg (195 lb)     Height: 175.3 cm (69\")       Medications   Sodium Chloride (PF) 0.9 % 10 mL (has no administration in time range)   sodium chloride 0.9 % bolus 1,000 mL (0 mL Intravenous Stopped 3/6/24 0315)   ondansetron (ZOFRAN) injection 4 mg (4 mg Intravenous Given 3/6/24 0147)   famotidine (PEPCID) injection 20 mg (20 mg Intravenous Given 3/6/24 0147)   metoclopramide (REGLAN) injection 10 mg (10 mg Intravenous Given 3/6/24 0346)     ECG/EMG Results (last 24 hours)       ** No results found for the last 24 hours. **          ECG 12 Lead Electrolyte Imbalance   Preliminary Result   Test Reason : Electrolyte Imbalance   Blood Pressure :   */*   mmHG   Vent. Rate :  78 BPM     Atrial Rate :  78 BPM      P-R Int : 134 ms          QRS Dur :  82 ms       QT Int : 402 ms       P-R-T Axes :  62  63  61 degrees      QTc Int : 458 ms      Normal sinus rhythm   Junctional ST depression, probably normal   Borderline ECG   No previous ECGs available      Referred By: EDMD           Confirmed By:               Medical Decision Making  Problems Addressed:  Acute cough: complicated acute illness or injury  Fever and chills: complicated acute illness or injury  Generalized weakness: complicated acute " illness or injury  History of hypertension: complicated acute illness or injury  Hyponatremia: complicated acute illness or injury  Influenza A: complicated acute illness or injury  Malaise and fatigue: complicated acute illness or injury  Nausea vomiting and diarrhea: complicated acute illness or injury  Neutropenia, unspecified type: complicated acute illness or injury    Amount and/or Complexity of Data Reviewed  Labs: ordered.  ECG/medicine tests: ordered.    Risk  Prescription drug management.  Decision regarding hospitalization.        Final diagnoses:   Hyponatremia   Nausea vomiting and diarrhea   Influenza A   Generalized weakness   Malaise and fatigue   Fever and chills   Acute cough   Neutropenia, unspecified type   History of hypertension       ED Disposition  ED Disposition       ED Disposition   Decision to Admit    Condition   --    Comment   Level of Care: Critical Care [6]   Admitting Physician: ADRIAN HUERTA [[id number]]   Attending Physician: ADRIAN HUERTA [[id number]]                 No follow-up provider specified.       Medication List      No changes were made to your prescriptions during this visit.            Varsha Velasco PA-C  03/06/24 0404

## 2024-03-06 NOTE — PLAN OF CARE
Goal Outcome Evaluation:  Patient arrived to unit at 0445. Alert and oriented, vital signs stable and afebrile. Sodium lab value at 0600 up to 119 from 115.

## 2024-03-06 NOTE — CASE MANAGEMENT/SOCIAL WORK
Discharge Planning Assessment  [facility]     Patient Name: Inez Ladd  MRN: [id number]  Today's Date: 3/6/2024    Admit Date: 3/6/2024    Plan: IDP   Discharge Needs Assessment       Row Name 03/06/24 1055       Living Environment    People in Home sibling(s)    Unique Family Situation pt's sister/Ravi lives with her    Current Living Arrangements home    Potentially Unsafe Housing Conditions none    Primary Care Provided by self    Provides Primary Care For no one    Family Caregiver if Needed child(jaclyn), adult;sibling(s)    Quality of Family Relationships unable to assess    Able to Return to Prior Arrangements yes       Transition Planning    Patient/Family Anticipates Transition to home    Patient/Family Anticipated Services at Transition none    Transportation Anticipated family or friend will provide                   Discharge Plan       Row Name 03/06/24 1055       Plan    Plan IDP    Patient/Family in Agreement with Plan yes    Plan Comments  spoke pt, at the bedside today. Pt's states that her sister/Ravi lives with her in [facility]. Pt is independent with ADL's, still works part time. No DME/HH/OPPT. Plans to D/C home, sister to transport. No needs voiced or identified.  will continue to follow.    Final Discharge Disposition Code 01 - home or self-care                  Continued Care and Services - Admitted Since 3/6/2024    No active coordination exists for this encounter.          Demographic Summary       Row Name 03/06/24 105       General Information    Reason for Consult discharge planning    Preferred Language English       Contact Information    Permission Granted to Share Info With                    Functional Status       Row Name 03/06/24 1054       Functional Status    Usual Activity Tolerance good       Functional Status, IADL    Medications independent    Meal Preparation independent    Housekeeping independent    Laundry independent     Shopping independent                   Psychosocial    No documentation.                  Abuse/Neglect    No documentation.                  Legal    No documentation.                  Substance Abuse    No documentation.                  Patient Forms    No documentation.                     Briana Rios RN

## 2024-03-07 LAB
ANION GAP SERPL CALCULATED.3IONS-SCNC: 11 MMOL/L (ref 5–15)
BASOPHILS # BLD AUTO: 0.01 10*3/MM3 (ref 0–0.2)
BASOPHILS NFR BLD AUTO: 0.3 % (ref 0–1.5)
BUN SERPL-MCNC: 9 MG/DL (ref 8–23)
BUN/CREAT SERPL: 15 (ref 7–25)
CALCIUM SPEC-SCNC: 8.5 MG/DL (ref 8.6–10.5)
CHLORIDE SERPL-SCNC: 102 MMOL/L (ref 98–107)
CO2 SERPL-SCNC: 23 MMOL/L (ref 22–29)
CREAT SERPL-MCNC: 0.6 MG/DL (ref 0.57–1)
DEPRECATED RDW RBC AUTO: 37.1 FL (ref 37–54)
EGFRCR SERPLBLD CKD-EPI 2021: 97.3 ML/MIN/1.73
EOSINOPHIL # BLD AUTO: 0.01 10*3/MM3 (ref 0–0.4)
EOSINOPHIL NFR BLD AUTO: 0.3 % (ref 0.3–6.2)
ERYTHROCYTE [DISTWIDTH] IN BLOOD BY AUTOMATED COUNT: 12 % (ref 12.3–15.4)
GLUCOSE SERPL-MCNC: 97 MG/DL (ref 65–99)
HCT VFR BLD AUTO: 38 % (ref 34–46.6)
HGB BLD-MCNC: 12.7 G/DL (ref 12–15.9)
IMM GRANULOCYTES # BLD AUTO: 0.01 10*3/MM3 (ref 0–0.05)
IMM GRANULOCYTES NFR BLD AUTO: 0.3 % (ref 0–0.5)
LYMPHOCYTES # BLD AUTO: 0.95 10*3/MM3 (ref 0.7–3.1)
LYMPHOCYTES NFR BLD AUTO: 25 % (ref 19.6–45.3)
MCH RBC QN AUTO: 28.5 PG (ref 26.6–33)
MCHC RBC AUTO-ENTMCNC: 33.4 G/DL (ref 31.5–35.7)
MCV RBC AUTO: 85.2 FL (ref 79–97)
MONOCYTES # BLD AUTO: 0.29 10*3/MM3 (ref 0.1–0.9)
MONOCYTES NFR BLD AUTO: 7.6 % (ref 5–12)
NEUTROPHILS NFR BLD AUTO: 2.53 10*3/MM3 (ref 1.7–7)
NEUTROPHILS NFR BLD AUTO: 66.5 % (ref 42.7–76)
NRBC BLD AUTO-RTO: 0 /100 WBC (ref 0–0.2)
PLATELET # BLD AUTO: 184 10*3/MM3 (ref 140–450)
PMV BLD AUTO: 9.7 FL (ref 6–12)
POTASSIUM SERPL-SCNC: 3.4 MMOL/L (ref 3.5–5.2)
RBC # BLD AUTO: 4.46 10*6/MM3 (ref 3.77–5.28)
SODIUM SERPL-SCNC: 134 MMOL/L (ref 136–145)
SODIUM SERPL-SCNC: 136 MMOL/L (ref 136–145)
SODIUM SERPL-SCNC: 136 MMOL/L (ref 136–145)
SODIUM SERPL-SCNC: 137 MMOL/L (ref 136–145)
WBC NRBC COR # BLD AUTO: 3.8 10*3/MM3 (ref 3.4–10.8)

## 2024-03-07 PROCEDURE — 0 DEXTROSE 5 % SOLUTION

## 2024-03-07 PROCEDURE — 84295 ASSAY OF SERUM SODIUM: CPT | Performed by: INTERNAL MEDICINE

## 2024-03-07 PROCEDURE — 84295 ASSAY OF SERUM SODIUM: CPT

## 2024-03-07 PROCEDURE — 85025 COMPLETE CBC W/AUTO DIFF WBC: CPT | Performed by: INTERNAL MEDICINE

## 2024-03-07 PROCEDURE — 97162 PT EVAL MOD COMPLEX 30 MIN: CPT

## 2024-03-07 PROCEDURE — 80048 BASIC METABOLIC PNL TOTAL CA: CPT | Performed by: INTERNAL MEDICINE

## 2024-03-07 PROCEDURE — 99233 SBSQ HOSP IP/OBS HIGH 50: CPT | Performed by: INTERNAL MEDICINE

## 2024-03-07 RX ORDER — BENZONATATE 100 MG/1
100 CAPSULE ORAL 3 TIMES DAILY PRN
Status: DISCONTINUED | OUTPATIENT
Start: 2024-03-07 | End: 2024-03-08 | Stop reason: HOSPADM

## 2024-03-07 RX ORDER — DEXTROSE MONOHYDRATE 50 MG/ML
100 INJECTION, SOLUTION INTRAVENOUS CONTINUOUS
Status: DISCONTINUED | OUTPATIENT
Start: 2024-03-07 | End: 2024-03-07

## 2024-03-07 RX ORDER — GUAIFENESIN 600 MG/1
600 TABLET, EXTENDED RELEASE ORAL EVERY 12 HOURS SCHEDULED
Status: DISCONTINUED | OUTPATIENT
Start: 2024-03-07 | End: 2024-03-08 | Stop reason: HOSPADM

## 2024-03-07 RX ORDER — DEXTROSE MONOHYDRATE 50 MG/ML
6 INJECTION, SOLUTION INTRAVENOUS CONTINUOUS
Status: DISCONTINUED | OUTPATIENT
Start: 2024-03-07 | End: 2024-03-07

## 2024-03-07 RX ADMIN — GUAIFENESIN 600 MG: 600 TABLET, EXTENDED RELEASE ORAL at 10:19

## 2024-03-07 RX ADMIN — DEXTROSE MONOHYDRATE 100 ML/HR: 50 INJECTION, SOLUTION INTRAVENOUS at 06:53

## 2024-03-07 RX ADMIN — BENZONATATE 100 MG: 100 CAPSULE ORAL at 23:37

## 2024-03-07 RX ADMIN — OSELTAMIVIR PHOSPHATE 75 MG: 75 CAPSULE ORAL at 23:34

## 2024-03-07 RX ADMIN — GUAIFENESIN 600 MG: 600 TABLET, EXTENDED RELEASE ORAL at 23:34

## 2024-03-07 RX ADMIN — BENZONATATE 100 MG: 100 CAPSULE ORAL at 02:34

## 2024-03-07 RX ADMIN — OSELTAMIVIR PHOSPHATE 75 MG: 75 CAPSULE ORAL at 08:40

## 2024-03-07 NOTE — PAYOR COMM NOTE
"Ref# [id number]    Utilization Review  Phone [phone]  Fax [phone]    [place] [address]       Inez Ludwig (69 y.o. Female)       Date of Birth   1955    Social Security Number       Address   [address] [place] [address]    Home Phone   [phone]    MRN   [id number]       Worship   [employer]    Marital Status                               Admission Date   3/6/24    Admission Type   Emergency    Admitting Provider   Bradly Carlson MD    Attending Provider   Bradly Carlson MD    Department, Room/Bed   [facility] 2B ICU, N227/1       Discharge Date       Discharge Disposition       Discharge Destination                                 Attending Provider: Bradly Carlson MD    Allergies: No Known Allergies    Isolation: None   Infection: None   Code Status: CPR    Ht: 175.3 cm (69\")   Wt: 88.5 kg (195 lb)    Admission Cmt: None   Principal Problem: Hyponatremia [E87.1]                   Active Insurance as of 3/6/2024       Primary Coverage       Payor Plan Insurance Group Employer/Plan Group    ANTHEM MEDICARE REPLACEMENT ANTHEM MEDICARE ADVANTAGE [id number]       Payor Plan Address Payor Plan Phone Number Payor Plan Fax Number Effective Dates    [address] [phone]  1/1/2024 - None Entered    [facility] [address]         Subscriber Name Subscriber Birth Date Member ID       INEZ LUDWIG 1955 [id number]                     Emergency Contacts        (Rel.) Home Phone Work Phone Mobile Phone    Cristy Haywood (Daughter) -- -- [phone]    MohiniRavi garcia (Sister) [phone] -- --    Jessica,John (Son) [phone] -- --              Soraida: NP [id number] Tax ID [id number]  Insurance Information                  ANTHEM MEDICARE REPLACEMENT/ANTHEM MEDICARE ADVANTAGE Phone: [phone]    Subscriber: Inez Ludwig Subscriber#: [id number]    Group#: " [id number] Precert#: --             History & Physical        Bradly Carlson MD at 03/06/24 0506            Intensive Care Admission Note     Hyponatremia    History of Present Illness     This is a very nice 69-year-old patient with past medical history of hypertension and a recent diagnosis of influenza A for which she was started on Tamiflu 2 days ago.  She presents to the emergency department today after progressive feelings of generalized weakness as well as not being able to keep any p.o. food or drink down for the past 48 hours.  She notes that she has had several episodes of watery diarrhea today.  She continues to have fevers and chills.  She has denied any shortness of breath, chest pain, headaches, or visual changes.  She does complain of thirst as well as a sore throat.  She denies abdominal pain but is having cramping.  She does endorse myalgias.  She did receive some Zofran in the emergency department which was somewhat helpful.  Her labs are significant for hyponatremia of 115.  She has received a liter of normal saline in the emergency department and a point-of-care showed an increase to 119.  We were consulted for consideration of admission to the intensive care unit for close monitoring secondary to her low sodium level.    Problem List, Surgical History, Family, Social History, and ROS     Hyponatremia    Influenza A    Diarrhea     Past Surgical History:   Procedure Laterality Date    CARPAL TUNNEL RELEASE      KNEE SURGERY         No Known Allergies  No current facility-administered medications on file prior to encounter.     Current Outpatient Medications on File Prior to Encounter   Medication Sig    acetaminophen (TYLENOL) 500 MG tablet Take 1 tablet by mouth Every 6 (Six) Hours As Needed for Mild Pain.    amLODIPine (NORVASC) 5 MG tablet Take 1 tablet by mouth Daily.    amoxicillin-clavulanate (AUGMENTIN) 875-125 MG per tablet Take 1 tablet by mouth 2 (Two) Times a Day.    cetirizine  (zyrTEC) 5 MG tablet Take 1 tablet by mouth Daily for 30 days.    estradiol (ESTRACE VAGINAL) 0.1 MG/GM vaginal cream Insert 1 gm intravaginally 3 times each week for 3 weeks and then weekly thereafter    ipratropium (ATROVENT) 0.03 % nasal spray 2 sprays into the nostril(s) as directed by provider Every 12 (Twelve) Hours for 10 days.    ketotifen (ZADITOR) 0.025 % ophthalmic solution Administer 1 drop into the left eye 4 (Four) Times a Day.    lisinopril (PRINIVIL,ZESTRIL) 5 MG tablet Take 1 tablet by mouth Daily.    meloxicam (MOBIC) 15 MG tablet Take 1 tablet by mouth Daily.    ondansetron ODT (ZOFRAN-ODT) 4 MG disintegrating tablet Place 1 tablet on the tongue Every 8 (Eight) Hours As Needed for Nausea or Vomiting.    oseltamivir (Tamiflu) 75 MG capsule Take 1 capsule by mouth 2 (Two) Times a Day.    trimethoprim-polymyxin b (POLYTRIM) 63630-2.1 UNIT/ML-% ophthalmic solution Administer 1 drop into the left eye Every 6 (Six) Hours for 7 days.    [DISCONTINUED] hydroCHLOROthiazide (HYDRODIURIL) 12.5 MG tablet Take 1 tablet by mouth Daily.     MEDICATION LIST AND ALLERGIES REVIEWED.    Family History   Problem Relation Age of Onset    Coronary artery disease Other     Coronary artery disease Mother     Hypertension Mother     Dementia Mother     No Known Problems Father     Heart disease Maternal Grandmother     Breast cancer Neg Hx     Ovarian cancer Neg Hx      Social History     Tobacco Use    Smoking status: Former     Current packs/day: 0.00     Average packs/day: 0.3 packs/day for 14.0 years (3.5 ttl pk-yrs)     Types: Cigarettes     Start date: 1984     Quit date: 1998     Years since quittin.1     Passive exposure: Past    Smokeless tobacco: Never   Vaping Use    Vaping status: Never Used   Substance Use Topics    Alcohol use: Yes     Comment: Occasional alcohol use     Drug use: No     Social History     Social History Narrative    Not on file     FAMILY AND SOCIAL HISTORY REVIEWED.    Review  "of Systems  A full review of systems has been obtained and it is negative except as mentioned expressly in HPI.    Physical Exam and Clinical Information   /64   Pulse 81   Temp 97.8 °F (36.6 °C) (Oral)   Resp 19   Ht 175.3 cm (69\")   Wt 88.5 kg (195 lb)   LMP  (LMP Unknown)   SpO2 95%   BMI 28.80 kg/m²   Physical Exam  Vitals reviewed.   Constitutional:       General: She is not in acute distress.     Appearance: She is ill-appearing.   HENT:      Head: Normocephalic.      Mouth/Throat:      Mouth: Mucous membranes are dry.      Pharynx: No oropharyngeal exudate.   Eyes:      General: No scleral icterus.     Pupils: Pupils are equal, round, and reactive to light.   Cardiovascular:      Rate and Rhythm: Normal rate and regular rhythm.      Pulses: Normal pulses.      Heart sounds: No murmur heard.  Pulmonary:      Effort: Pulmonary effort is normal. No respiratory distress.      Breath sounds: Normal breath sounds. No wheezing or rales.   Chest:      Chest wall: No tenderness.   Abdominal:      General: Abdomen is flat. There is no distension.      Palpations: Abdomen is soft.      Comments: Hyperactive bowel sounds.  No masses.  No tenderness to deep palpation.   Musculoskeletal:         General: Normal range of motion.      Cervical back: Normal range of motion and neck supple. No tenderness.      Right lower leg: No edema.      Left lower leg: No edema.   Skin:     General: Skin is warm and dry.      Capillary Refill: Capillary refill takes less than 2 seconds.   Neurological:      General: No focal deficit present.      Mental Status: She is alert and oriented to person, place, and time.         Results from last 7 days   Lab Units 03/06/24  0301 03/06/24  0139 03/05/24  0835   WBC 10*3/mm3  --  2.75* 6.37   HEMOGLOBIN g/dL  --  11.9* 13.1   HEMOGLOBIN, POC g/dL 11.2*  --   --    PLATELETS 10*3/mm3  --  162 200     Results from last 7 days   Lab Units 03/06/24  0301 03/06/24  0139 03/05/24  0835 "   SODIUM mmol/L  --  115* 132*   POTASSIUM mmol/L  --  3.5 3.9   CO2 mmol/L  --  23.0 22.5   BUN mg/dL  --  17 18   CREATININE mg/dL 0.40* 0.48* 0.64   MAGNESIUM mg/dL  --  1.6  --    GLUCOSE mg/dL  --  152* 123*     Estimated Creatinine Clearance: 157.4 mL/min (A) (by C-G formula based on SCr of 0.4 mg/dL (L)).          Lab Results   Component Value Date    LACTATE 0.9 03/06/2024          I reviewed the patient's results and images.     Impression     Medical Problems       Hospital Problem List       * (Principal) Hyponatremia    Influenza A    Diarrhea        Plan/Recommendations     Patient is hyponatremic and has not had much in the way of oral intake in the past several days and does continue with significant nausea, vomiting, and has started diarrhea today.  There are no neurological symptoms to suggest the need for hypertonic saline at this time.  I would like to go ahead and have her admitted to the ICU for close monitoring to correct her sodium slowly as well as to control her symptoms.  Will plan for the following:    Initiate D5 normal saline at 75 mL/h.  Every 4 hour BMP until stabilized.  Antiemesis as needed.  Will continue on with Tamiflu.  Will give her third day of therapy today as she initiated therapy on Monday.  DVT prophylaxis with heparin.    High level of risk due to:  drug(s) requiring intensive monitoring for toxicity and decision regarding hospitalization or escalation of level of hospital care.        Bradly Carlson MD, [facility]  Pulmonary and Critical Care Medicine  03/06/24 05:06 EST     CC: Curt Sewell DO    Electronically signed by Bradly Carlson MD at 03/06/24 0516          Emergency Department Notes        Varsha Velasco PA-C at 03/06/24 0118        Procedure Orders    1. Critical Care [[id number]] ordered by Varsha Velasco PA-C              Attestation signed by Marizol Blandon MD at 03/06/24 0936        SHARED APC NON FACE TO FACE: I performed a substantive part of the  MDM during the patient's E/M visit. I personally made or approved the documented management plan and acknowledge its risk of complications.  I discussed the patient with the PA and I agree with the PA's assessment and plan. I reviewed the patient's lab results from today as well as prior lab results from within the past twenty-four hours, and lab results from prior to that.   Marizol Blandon MD 3/6/2024 09:35 EST                         Subjective   History of Present Illness  This is a 69-year-old female that presents the ER with 2-day history of flulike symptoms.  Patient reports rhinorrhea, nasal congestion, nonproductive cough.  She also has had low-grade fever of around 99.9.  She describes generalized headache, body aches, and malaise/fatigue.  She denies any chest congestion or shortness of breath.  She has had diarrhea x 2 today and she started having significant nausea today and vomited x 1.  She was seen at [facility] on 3/2/2024 and diagnosed with left conjunctivitis and prescribed polymyxin antibiotic drops.  She was then seen by PCP on 3/4/2024 for flulike symptoms and tested negative for influenza at that time but they felt like she had an acute left ear infection.  She was prescribed Augmentin for left otitis media.  She was seen again per PCP today and had outpatient labs which were reassuring, normal chest x-ray, and she tested positive for influenza A.  She was prescribed Tamiflu and Zofran.  She has not been unable to keep any p.o. or fluid intake down tonight.  She denies any abdominal pain.  She did not receive her influenza vaccine last fall, 2023.  Patient feels dehydrated and dizzy with standing.  She denies any near-syncope or syncope.  She denies any dysuria, urgency, or frequency.  She has urinated at least 5-6 times today.  Past medical history is significant for hypertension.  No other concerns at this time.    History provided by:  Patient and relative  Flu Symptoms  Presenting symptoms:  cough (NP cough), diarrhea (x 2), fatigue, fever, headache, myalgias, nausea, rhinorrhea and vomiting (Emesis x 1)    Presenting symptoms: no shortness of breath and no sore throat    Onset quality:  Sudden  Duration:  2 days  Progression:  Unchanged  Chronicity:  New  Relieved by:  Nothing  Worsened by:  Nothing  Ineffective treatments:  OTC medications, prescription medications and drinking (Rx ror Tamiflu and Zofran earlier today through PCP.)  Associated symptoms: chills, decreased appetite, decreased physical activity and nasal congestion    Associated symptoms: no ear pain, no neck stiffness and no syncope    Risk factors: being elderly and sick contacts (Pt's boss recently had the flu)        Review of Systems   Constitutional:  Positive for activity change, appetite change, chills, decreased appetite, fatigue and fever.   HENT:  Positive for congestion and rhinorrhea. Negative for ear pain, sinus pressure, sinus pain, sneezing and sore throat.    Respiratory:  Positive for cough (NP cough). Negative for shortness of breath.    Cardiovascular: Negative.  Negative for chest pain, palpitations and leg swelling.   Gastrointestinal:  Positive for diarrhea (x 2), nausea and vomiting (Emesis x 1). Negative for abdominal pain.   Genitourinary: Negative.  Negative for decreased urine volume (Pt has urinated at least 5-6 times today.), flank pain, frequency and urgency.   Musculoskeletal:  Positive for myalgias. Negative for back pain and neck stiffness.   Neurological:  Positive for dizziness (dizziness with standing. No near syncope or syncope.), weakness and headaches.   All other systems reviewed and are negative.      Past Medical History:   Diagnosis Date    Acute cystitis     Acute pharyngitis     Body aches     Cancer     skin     Cellulitis     Dysuria     H/O bladder infections     2 SINCE AUGUST, urinary tract infection    History of prolapse of bladder      Bladder disorder    Hypertension     Impacted  cerumen     Impacted cerumen, unspecified ear     Infected insect bite of forearm     Menopause     Prophylactic immunotherapy     Separation of AC joint 2006    Right Shoulder       No Known Allergies    Past Surgical History:   Procedure Laterality Date    CARPAL TUNNEL RELEASE      KNEE SURGERY         Family History   Problem Relation Age of Onset    Coronary artery disease Other     Coronary artery disease Mother     Hypertension Mother     Dementia Mother     No Known Problems Father     Heart disease Maternal Grandmother     Breast cancer Neg Hx     Ovarian cancer Neg Hx        Social History     Socioeconomic History    Marital status:    Tobacco Use    Smoking status: Former     Current packs/day: 0.00     Average packs/day: 0.3 packs/day for 14.0 years (3.5 ttl pk-yrs)     Types: Cigarettes     Start date: 1984     Quit date: 1998     Years since quittin.1     Passive exposure: Past    Smokeless tobacco: Never   Vaping Use    Vaping status: Never Used   Substance and Sexual Activity    Alcohol use: Yes     Comment: Occasional alcohol use     Drug use: No    Sexual activity: Not Currently     Birth control/protection: None           Objective   Physical Exam  Vitals and nursing note reviewed.   Constitutional:       General: She is not in acute distress.     Appearance: Normal appearance. She is ill-appearing. She is not toxic-appearing or diaphoretic.      Comments: Mildly ill.  Nontoxic.  No acute distress.   HENT:      Head: Normocephalic and atraumatic.      Right Ear: Tympanic membrane normal. Decreased hearing noted. Tympanic membrane is not erythematous, retracted or bulging.      Left Ear: Tympanic membrane normal. Decreased hearing noted. Tympanic membrane is not erythematous, retracted or bulging.      Ears:      Comments: Bilateral TMs are clear.  Left TM is nonerythematous and there is no retraction or bulging.  Patient was just diagnosed with left otitis media on 3/4/2024  but I do not appreciate acute left ear infection.     Nose: Nose normal. Congestion and rhinorrhea present.      Right Sinus: No maxillary sinus tenderness or frontal sinus tenderness.      Left Sinus: No maxillary sinus tenderness or frontal sinus tenderness.      Comments: Audible nasal congestion with rhinorrhea     Mouth/Throat:      Mouth: Mucous membranes are moist.      Pharynx: Oropharynx is clear.   Eyes:      Extraocular Movements: Extraocular movements intact.      Conjunctiva/sclera: Conjunctivae normal.      Right eye: Right conjunctiva is not injected.      Left eye: Left conjunctiva is not injected.      Pupils: Pupils are equal, round, and reactive to light.      Comments: There is no erythema or purulent drainage or sign of conjunctivitis to either eye on exam today.   Neck:      Comments: No cervical lymphadenopathy  Cardiovascular:      Rate and Rhythm: Normal rate and regular rhythm. No extrasystoles are present.     Pulses: Normal pulses.      Heart sounds: Normal heart sounds.      Comments: Regular rate and rhythm.  No ectopy.  No pedal edema to lower extremities  Pulmonary:      Effort: Pulmonary effort is normal. No tachypnea, accessory muscle usage or retractions.      Breath sounds: Normal breath sounds. No decreased breath sounds, wheezing or rhonchi.      Comments: Regular respiratory effort.  Nonproductive cough noted on exam.  Good air exchange to bilateral lung fields.  No wheezes, rhonchi, or decreased breath sounds concerning for consolidation  Abdominal:      General: Bowel sounds are normal. There is no distension.      Palpations: Abdomen is soft.      Tenderness: There is no abdominal tenderness. There is no right CVA tenderness, left CVA tenderness, guarding or rebound.      Comments: Abdomen soft and nontender.  No flank or CVA tenderness   Musculoskeletal:         General: Normal range of motion.      Cervical back: Normal range of motion and neck supple.      Right lower  leg: No edema.      Left lower leg: No edema.   Lymphadenopathy:      Cervical: No cervical adenopathy.      Right cervical: No superficial, deep or posterior cervical adenopathy.     Left cervical: No superficial, deep or posterior cervical adenopathy.   Skin:     General: Skin is warm and dry.      Coloration: Skin is not pale.      Findings: No lesion or rash.      Comments: Skin is warm and patient is diaphoretic, more than likely from fever breaking.   Neurological:      General: No focal deficit present.      Mental Status: She is alert and oriented to person, place, and time.      Cranial Nerves: Cranial nerves 2-12 are intact.      Sensory: Sensation is intact.      Motor: Motor function is intact.      Coordination: Coordination is intact.      Comments: Neuro intact and nonfocal   Psychiatric:         Mood and Affect: Affect normal. Mood is anxious.         Speech: Speech normal.         Behavior: Behavior normal. Behavior is cooperative.         Thought Content: Thought content normal.         Cognition and Memory: Cognition normal.         Judgment: Judgment normal.      Comments: Anxious on exam         Critical Care    Performed by: Varsha Velasco PA-C  Authorized by: Marizol Blandon MD    Critical care provider statement:     Critical care time (minutes):  35    Critical care time was exclusive of:  Separately billable procedures and treating other patients    Critical care was necessary to treat or prevent imminent or life-threatening deterioration of the following conditions:  Metabolic crisis (Hyponatremia with sodium level 115, nausea/vomiting/diarrhea secondary to influenza A)    Critical care was time spent personally by me on the following activities:  Blood draw for specimens, development of treatment plan with patient or surrogate, discussions with consultants, examination of patient, evaluation of patient's response to treatment, obtaining history from patient or surrogate, review of old  charts, ordering and review of radiographic studies, ordering and review of laboratory studies, ordering and performing treatments and interventions, pulse oximetry and re-evaluation of patient's condition            ED Course  ED Course as of 03/06/24 0404   Wed Mar 06, 2024   0134 Patient had outpatient labs earlier this same day through PCP which were within normal limits.  Patient tested negative for strep pharyngitis, negative for COVID-19, and positive for influenza A.  She also had an outpatient PA and lateral chest x-ray, and after reviewing that formal read, patient had some subtle bronchial wall thickening, likely viral or reactive.  No evidence of pneumonia or focal consolidation.  I advised patient and family that we were going to do IV fluids, antiemetics, and repeat some labs.  Patient's family did not understand why we needed to repeat labs because she had outpatient labs earlier this same day.  I advised that we are rechecking electrolytes with vomiting and diarrhea. [FC]   0135 O2 sat is 96% on room air.  Patient has no abdominal tenderness. [FC]   0217 Lab contacted us with significantly low sodium level of 115.  Patient actually had outpatient labs 17 hours ago and sodium level was 132.  Patient has only had 1 episode of vomiting and 2 loose stools.  Patient also had significant change in serum chloride from 95-82.  The current specimen did not appear hemolyzed.  Discussed the case with Dr. Blandon, ER attending physician.  We will perform POC Chem-8 to see if it is similar to chemistries drawn when patient got here.  Patient is not on any diuretics. [FC]   0255 Patient CBC from approximately 17 hours ago showed white blood cell count of 6000 and repeat CBC during this ER course showed white blood cell count of 2.75.  There has been significant change in the labs, and I am getting a POC Chem-8 at this time for comparison.  Upon reassessment, patient continues to report nausea after IV Zofran but she  has not vomited anymore.  I will order IV Reglan. [FC]   0303 POC Chem-8 revealed sodium of 119 and potassium was 3.7, so chemistries from earlier were pretty consistent with POC chem 8 results.  I we will page hospitalist, Dr. Hahn, to discuss admission.  Discussed admission with Dr. Hahn, and due to significant hyponatremia, she said to talk to intensivist to see if patient would require hypertonic saline infusion. [FC]   0337 Discussed the case with Dr. Carlson, Intensivist, due to significant hypokalemia.  He said that he is not going to initiate hypertonic saline, but he will watch patient closely in the ICU due to significant electrolyte abnormality.  I updated hospitalist team. [FC]      ED Course User Index  [FC] Varsha Velasco PA-C                                           Recent Results (from the past 24 hour(s))   CBC (No Diff)    Collection Time: 03/05/24  8:35 AM    Specimen: Blood   Result Value Ref Range    WBC 6.37 3.40 - 10.80 10*3/mm3    RBC 4.63 3.77 - 5.28 10*6/mm3    Hemoglobin 13.1 12.0 - 15.9 g/dL    Hematocrit 39.4 34.0 - 46.6 %    MCV 85.1 79.0 - 97.0 fL    MCH 28.3 26.6 - 33.0 pg    MCHC 33.2 31.5 - 35.7 g/dL    RDW 12.2 (L) 12.3 - 15.4 %    RDW-SD 37.4 37.0 - 54.0 fl    MPV 10.3 6.0 - 12.0 fL    Platelets 200 140 - 450 10*3/mm3   Comprehensive metabolic panel    Collection Time: 03/05/24  8:35 AM    Specimen: Blood   Result Value Ref Range    Glucose 123 (H) 65 - 99 mg/dL    BUN 18 8 - 23 mg/dL    Creatinine 0.64 0.57 - 1.00 mg/dL    Sodium 132 (L) 136 - 145 mmol/L    Potassium 3.9 3.5 - 5.2 mmol/L    Chloride 95 (L) 98 - 107 mmol/L    CO2 22.5 22.0 - 29.0 mmol/L    Calcium 8.8 8.6 - 10.5 mg/dL    Total Protein 6.8 6.0 - 8.5 g/dL    Albumin 4.4 3.5 - 5.2 g/dL    ALT (SGPT) 13 1 - 33 U/L    AST (SGOT) 17 1 - 32 U/L    Alkaline Phosphatase 79 39 - 117 U/L    Total Bilirubin 0.3 0.0 - 1.2 mg/dL    Globulin 2.4 gm/dL    A/G Ratio 1.8 g/dL    BUN/Creatinine Ratio 28.1 (H) 7.0 - 25.0    Anion  Gap 14.5 5.0 - 15.0 mmol/L    eGFR 95.8 >60.0 mL/min/1.73   POCT rapid strep A    Collection Time: 03/05/24  9:20 AM    Specimen: Swab   Result Value Ref Range    Rapid Strep A Screen Negative Negative, VALID, INVALID, Not Performed    Internal Control Passed Passed    Lot Number [id number]     Expiration Date 10/1/26    POCT Influenza A/B    Collection Time: 03/05/24  9:20 AM    Specimen: Swab   Result Value Ref Range    Rapid Influenza A Ag Positive (A) Negative    Rapid Influenza B Ag Negative Negative    Internal Control Passed Passed    Lot Number [id number]     Expiration Date 12/8/25    POCT SARS-CoV-2 Antigen ALONSO    Collection Time: 03/05/24  9:21 AM    Specimen: Swab   Result Value Ref Range    SARS Antigen Not Detected Not Detected, Presumptive Negative    Internal Control Passed Passed    Lot Number [id number]     Expiration Date 7/3/24    Comprehensive Metabolic Panel    Collection Time: 03/06/24  1:39 AM    Specimen: Blood   Result Value Ref Range    Glucose 152 (H) 65 - 99 mg/dL    BUN 17 8 - 23 mg/dL    Creatinine 0.48 (L) 0.57 - 1.00 mg/dL    Sodium 115 (C) 136 - 145 mmol/L    Potassium 3.5 3.5 - 5.2 mmol/L    Chloride 82 (L) 98 - 107 mmol/L    CO2 23.0 22.0 - 29.0 mmol/L    Calcium 7.9 (L) 8.6 - 10.5 mg/dL    Total Protein 6.0 6.0 - 8.5 g/dL    Albumin 4.0 3.5 - 5.2 g/dL    ALT (SGPT) 12 1 - 33 U/L    AST (SGOT) 20 1 - 32 U/L    Alkaline Phosphatase 71 39 - 117 U/L    Total Bilirubin 0.6 0.0 - 1.2 mg/dL    Globulin 2.0 gm/dL    A/G Ratio 2.0 g/dL    BUN/Creatinine Ratio 35.4 (H) 7.0 - 25.0    Anion Gap 10.0 5.0 - 15.0 mmol/L    eGFR 102.7 >60.0 mL/min/1.73   Lipase    Collection Time: 03/06/24  1:39 AM    Specimen: Blood   Result Value Ref Range    Lipase 31 13 - 60 U/L   Lactic Acid, Plasma    Collection Time: 03/06/24  1:39 AM    Specimen: Blood   Result Value Ref Range    Lactate 0.9 0.5 - 2.0 mmol/L   Green Top (Gel)    Collection Time: 03/06/24  1:39 AM   Result Value Ref Range    Extra Tube Hold  for add-ons.    Lavender Top    Collection Time: 03/06/24  1:39 AM   Result Value Ref Range    Extra Tube hold for add-on    Gold Top - SST    Collection Time: 03/06/24  1:39 AM   Result Value Ref Range    Extra Tube Hold for add-ons.    Light Blue Top    Collection Time: 03/06/24  1:39 AM   Result Value Ref Range    Extra Tube Hold for add-ons.    CBC Auto Differential    Collection Time: 03/06/24  1:39 AM    Specimen: Blood   Result Value Ref Range    WBC 2.75 (L) 3.40 - 10.80 10*3/mm3    RBC 4.01 3.77 - 5.28 10*6/mm3    Hemoglobin 11.9 (L) 12.0 - 15.9 g/dL    Hematocrit 34.2 34.0 - 46.6 %    MCV 85.3 79.0 - 97.0 fL    MCH 29.7 26.6 - 33.0 pg    MCHC 34.8 31.5 - 35.7 g/dL    RDW 11.8 (L) 12.3 - 15.4 %    RDW-SD 37.2 37.0 - 54.0 fl    MPV 9.7 6.0 - 12.0 fL    Platelets 162 140 - 450 10*3/mm3    Neutrophil % 69.4 42.7 - 76.0 %    Lymphocyte % 20.7 19.6 - 45.3 %    Monocyte % 9.5 5.0 - 12.0 %    Eosinophil % 0.0 (L) 0.3 - 6.2 %    Basophil % 0.0 0.0 - 1.5 %    Immature Grans % 0.4 0.0 - 0.5 %    Neutrophils, Absolute 1.91 1.70 - 7.00 10*3/mm3    Lymphocytes, Absolute 0.57 (L) 0.70 - 3.10 10*3/mm3    Monocytes, Absolute 0.26 0.10 - 0.90 10*3/mm3    Eosinophils, Absolute 0.00 0.00 - 0.40 10*3/mm3    Basophils, Absolute 0.00 0.00 - 0.20 10*3/mm3    Immature Grans, Absolute 0.01 0.00 - 0.05 10*3/mm3    nRBC 0.0 0.0 - 0.2 /100 WBC   Procalcitonin    Collection Time: 03/06/24  1:39 AM    Specimen: Blood   Result Value Ref Range    Procalcitonin 0.08 0.00 - 0.25 ng/mL   C-reactive Protein    Collection Time: 03/06/24  1:39 AM    Specimen: Blood   Result Value Ref Range    C-Reactive Protein 5.16 (H) 0.00 - 0.50 mg/dL   Magnesium    Collection Time: 03/06/24  1:39 AM    Specimen: Blood   Result Value Ref Range    Magnesium 1.6 1.6 - 2.4 mg/dL   Single High Sensitivity Troponin T    Collection Time: 03/06/24  1:39 AM    Specimen: Blood   Result Value Ref Range    HS Troponin T 6 <14 ng/L   POC Chem 8    Collection Time:  "03/06/24  3:01 AM    Specimen: Blood   Result Value Ref Range    Glucose 124 70 - 130 mg/dL    BUN 15 8 - 26 mg/dL    Creatinine 0.40 (L) 0.60 - 1.30 mg/dL    Sodium 119 (L) 138 - 146 mmol/L    POC Potassium 3.7 3.5 - 4.9 mmol/L    Chloride 86 (L) 98 - 109 mmol/L    Total CO2 25 24 - 29 mmol/L    Hemoglobin 11.2 (L) 12.0 - 17.0 g/dL    Hematocrit 33 (L) 38 - 51 %    Ionized Calcium 1.11 (L) 1.20 - 1.32 mmol/L    eGFR 107.3 >60.0 mL/min/1.73   ECG 12 Lead Electrolyte Imbalance    Collection Time: 03/06/24  3:11 AM   Result Value Ref Range    QT Interval 402 ms    QTC Interval 458 ms     Note: In addition to lab results from this visit, the labs listed above may include labs taken at another facility or during a different encounter within the last 24 hours. Please correlate lab times with ED admission and discharge times for further clarification of the services performed during this visit.    No orders to display     Vitals:    03/06/24 0107 03/06/24 0152 03/06/24 0330   BP: 121/67 118/65 119/70   BP Location: Left arm Left arm    Patient Position: Sitting Lying    Pulse: 72 75 77   Resp: 22 19    Temp: 97.8 °F (36.6 °C)     TempSrc: Oral     SpO2: 96% 95% 94%   Weight: 88.5 kg (195 lb)     Height: 175.3 cm (69\")       Medications   Sodium Chloride (PF) 0.9 % 10 mL (has no administration in time range)   sodium chloride 0.9 % bolus 1,000 mL (0 mL Intravenous Stopped 3/6/24 0315)   ondansetron (ZOFRAN) injection 4 mg (4 mg Intravenous Given 3/6/24 0147)   famotidine (PEPCID) injection 20 mg (20 mg Intravenous Given 3/6/24 0147)   metoclopramide (REGLAN) injection 10 mg (10 mg Intravenous Given 3/6/24 8316)     ECG/EMG Results (last 24 hours)       ** No results found for the last 24 hours. **          ECG 12 Lead Electrolyte Imbalance   Preliminary Result   Test Reason : Electrolyte Imbalance   Blood Pressure :   */*   mmHG   Vent. Rate :  78 BPM     Atrial Rate :  78 BPM      P-R Int : 134 ms          QRS Dur :  82 " ms       QT Int : 402 ms       P-R-T Axes :  62  63  61 degrees      QTc Int : 458 ms      Normal sinus rhythm   Junctional ST depression, probably normal   Borderline ECG   No previous ECGs available      Referred By: EDMD           Confirmed By:               Medical Decision Making  Problems Addressed:  Acute cough: complicated acute illness or injury  Fever and chills: complicated acute illness or injury  Generalized weakness: complicated acute illness or injury  History of hypertension: complicated acute illness or injury  Hyponatremia: complicated acute illness or injury  Influenza A: complicated acute illness or injury  Malaise and fatigue: complicated acute illness or injury  Nausea vomiting and diarrhea: complicated acute illness or injury  Neutropenia, unspecified type: complicated acute illness or injury    Amount and/or Complexity of Data Reviewed  Labs: ordered.  ECG/medicine tests: ordered.    Risk  Prescription drug management.  Decision regarding hospitalization.        Final diagnoses:   Hyponatremia   Nausea vomiting and diarrhea   Influenza A   Generalized weakness   Malaise and fatigue   Fever and chills   Acute cough   Neutropenia, unspecified type   History of hypertension       ED Disposition  ED Disposition       ED Disposition   Decision to Admit    Condition   --    Comment   Level of Care: Critical Care [6]   Admitting Physician: ADRIAN HUERTA [[id number]]   Attending Physician: ADRIAN HUERTA [[id number]]                 No follow-up provider specified.       Medication List      No changes were made to your prescriptions during this visit.            Varsha Velasco PA-C  03/06/24 0404      Electronically signed by Marizol Blandon MD at 03/06/24 0936       Oxygen Therapy (since admission)       Date/Time SpO2 Device (Oxygen Therapy) Flow (L/min) Oxygen Concentration (%) ETCO2 (mmHg)    03/07/24 0900 94 -- -- -- --    03/07/24 0800 92 -- -- -- --    03/07/24 0700 94 -- -- -- --     03/07/24 0600 96 nasal cannula 4 -- --    03/07/24 0500 96 -- -- -- --    03/07/24 0400 95 nasal cannula 4 -- --    03/07/24 0300 95 -- -- -- --    03/07/24 0200 97 nasal cannula 4 -- --    03/07/24 0100 97 -- -- -- --    03/07/24 0000 97 nasal cannula 4 -- --    03/06/24 2300 95 -- -- -- --    03/06/24 2200 97 nasal cannula 4 -- --    03/06/24 2100 96 -- -- -- --    03/06/24 2000 90 room air -- -- --    03/06/24 1900 92 -- -- -- --    03/06/24 1800 92 -- -- -- --    03/06/24 1700 90 -- -- -- --    03/06/24 1600 96 room air -- -- --    03/06/24 1500 98 -- -- -- --    03/06/24 1400 100 -- -- -- --    03/06/24 1300 95 -- -- -- --    03/06/24 1200 99 nasal cannula 6 -- --    03/06/24 1100 98 -- -- -- --    03/06/24 1000 98 -- -- -- --    03/06/24 0900 98 -- -- -- --    03/06/24 0800 97 nasal cannula 6 -- --    03/06/24 0700 98 -- -- -- --    03/06/24 0600 98 nasal cannula 6 -- --    03/06/24 0500 92 -- -- -- --    03/06/24 0445 96 room air -- -- --    03/06/24 0400 95 -- -- -- --    03/06/24 0330 94 -- -- -- --    03/06/24 01:52:33 95 room air -- -- --    03/06/24 0107 96 room air -- -- --          Lab Results (last 72 hours)       Procedure Component Value Units Date/Time    Sodium [[id number]]  (Normal) Collected: 03/07/24 0844    Specimen: Blood Updated: 03/07/24 0921     Sodium 137 mmol/L     Sodium [[id number]]  (Normal) Collected: 03/07/24 0543    Specimen: Blood Updated: 03/07/24 0620     Sodium 137 mmol/L     Basic Metabolic Panel [[id number]]  (Abnormal) Collected: 03/07/24 0357    Specimen: Blood Updated: 03/07/24 0504     Glucose 97 mg/dL      BUN 9 mg/dL      Creatinine 0.60 mg/dL      Sodium 136 mmol/L      Potassium 3.4 mmol/L      Chloride 102 mmol/L      CO2 23.0 mmol/L      Calcium 8.5 mg/dL      BUN/Creatinine Ratio 15.0     Anion Gap 11.0 mmol/L      eGFR 97.3 mL/min/1.73     Narrative:      GFR Normal >60  Chronic Kidney Disease <60  Kidney Failure <15      CBC & Differential [[id number]]  (Abnormal)  Collected: 03/07/24 0357    Specimen: Blood Updated: 03/07/24 0442    Narrative:      The following orders were created for panel order CBC & Differential.  Procedure                               Abnormality         Status                     ---------                               -----------         ------                     CBC Auto Differential[[id number]]        Abnormal            Final result                 Please view results for these tests on the individual orders.    CBC Auto Differential [[id number]]  (Abnormal) Collected: 03/07/24 0357    Specimen: Blood Updated: 03/07/24 0442     WBC 3.80 10*3/mm3      RBC 4.46 10*6/mm3      Hemoglobin 12.7 g/dL      Hematocrit 38.0 %      MCV 85.2 fL      MCH 28.5 pg      MCHC 33.4 g/dL      RDW 12.0 %      RDW-SD 37.1 fl      MPV 9.7 fL      Platelets 184 10*3/mm3      Neutrophil % 66.5 %      Lymphocyte % 25.0 %      Monocyte % 7.6 %      Eosinophil % 0.3 %      Basophil % 0.3 %      Immature Grans % 0.3 %      Neutrophils, Absolute 2.53 10*3/mm3      Lymphocytes, Absolute 0.95 10*3/mm3      Monocytes, Absolute 0.29 10*3/mm3      Eosinophils, Absolute 0.01 10*3/mm3      Basophils, Absolute 0.01 10*3/mm3      Immature Grans, Absolute 0.01 10*3/mm3      nRBC 0.0 /100 WBC     Basic Metabolic Panel [[id number]]  (Abnormal) Collected: 03/06/24 1620    Specimen: Blood Updated: 03/06/24 1644     Glucose 96 mg/dL      BUN 11 mg/dL      Creatinine 0.57 mg/dL      Sodium 128 mmol/L      Potassium 3.3 mmol/L      Chloride 94 mmol/L      CO2 23.0 mmol/L      Calcium 8.8 mg/dL      BUN/Creatinine Ratio 19.3     Anion Gap 11.0 mmol/L      eGFR 98.5 mL/min/1.73     Narrative:      GFR Normal >60  Chronic Kidney Disease <60  Kidney Failure <15      Basic Metabolic Panel [[id number]]  (Normal) Collected: 03/06/24 1421    Specimen: Blood Updated: 03/06/24 1451     Glucose 99 mg/dL      BUN 11 mg/dL      Creatinine 0.61 mg/dL      Sodium 136 mmol/L      Potassium 3.6 mmol/L       Chloride 100 mmol/L      CO2 24.0 mmol/L      Calcium 8.9 mg/dL      BUN/Creatinine Ratio 18.0     Anion Gap 12.0 mmol/L      eGFR 96.9 mL/min/1.73     Narrative:      GFR Normal >60  Chronic Kidney Disease <60  Kidney Failure <15      Basic Metabolic Panel [[id number]]  (Abnormal) Collected: 03/06/24 0819    Specimen: Blood from Arm, Left Updated: 03/06/24 0854     Glucose 113 mg/dL      BUN 12 mg/dL      Creatinine 0.50 mg/dL      Sodium 124 mmol/L      Potassium 3.6 mmol/L      Chloride 91 mmol/L      CO2 21.0 mmol/L      Calcium 8.3 mg/dL      BUN/Creatinine Ratio 24.0     Anion Gap 12.0 mmol/L      eGFR 101.7 mL/min/1.73     Narrative:      GFR Normal >60  Chronic Kidney Disease <60  Kidney Failure <15      Basic Metabolic Panel [[id number]]  (Abnormal) Collected: 03/06/24 0539    Specimen: Blood Updated: 03/06/24 0632     Glucose 112 mg/dL      BUN 14 mg/dL      Creatinine 0.43 mg/dL      Sodium 119 mmol/L      Potassium 3.6 mmol/L      Chloride 87 mmol/L      CO2 20.0 mmol/L      Calcium 7.7 mg/dL      BUN/Creatinine Ratio 32.6     Anion Gap 12.0 mmol/L      eGFR 105.4 mL/min/1.73     Narrative:      GFR Normal >60  Chronic Kidney Disease <60  Kidney Failure <15      Imogene Draw [[id number]] Collected: 03/06/24 0139    Specimen: Blood Updated: 03/06/24 0545    Narrative:      The following orders were created for panel order Imogene Draw.  Procedure                               Abnormality         Status                     ---------                               -----------         ------                     Green Top (Gel)[[id number]]                                  Final result               Lavender Top[[id number]]                                     Final result               Gold Top - [facility][[id number]]                                   Final result               Xiong Top[[id number]]                                         Final result               Light Blue Top[[id number]]                                    Final result                 Please view results for these tests on the individual orders.    Gray Top [[id number]] Collected: 03/06/24 0139    Specimen: Blood Updated: 03/06/24 0545     Extra Tube Hold for add-ons.     Comment: Auto resulted.       Urinalysis With Microscopic If Indicated (No Culture) - Urine, Clean Catch [[id number]]  (Abnormal) Collected: 03/06/24 0352    Specimen: Urine, Clean Catch Updated: 03/06/24 0418     Color, UA Yellow     Appearance, UA Clear     pH, UA 6.0     Specific Gravity, UA 1.022     Glucose, UA Negative     Ketones, UA 40 mg/dL (2+)     Bilirubin, UA Negative     Blood, UA Negative     Protein, UA Negative     Leuk Esterase, UA Negative     Nitrite, UA Negative     Urobilinogen, UA 1.0 E.U./dL    Narrative:      Urine microscopic not indicated.    POC Chem 8 [[id number]]  (Abnormal) Collected: 03/06/24 0301    Specimen: Blood Updated: 03/06/24 0307     Glucose 124 mg/dL      BUN 15 mg/dL      Creatinine 0.40 mg/dL      Sodium 119 mmol/L      POC Potassium 3.7 mmol/L      Chloride 86 mmol/L      Total CO2 25 mmol/L      Hemoglobin 11.2 g/dL      Comment: Serial Number: [id number]:  [id number]        Hematocrit 33 %      Ionized Calcium 1.11 mmol/L      eGFR 107.3 mL/min/1.73     Green Top (Gel) [[id number]] Collected: 03/06/24 0139    Specimen: Blood Updated: 03/06/24 0245     Extra Tube Hold for add-ons.     Comment: Auto resulted.       Lavender Top [[id number]] Collected: 03/06/24 0139    Specimen: Blood Updated: 03/06/24 0245     Extra Tube hold for add-on     Comment: Auto resulted       Gold Top - SST [[id number]] Collected: 03/06/24 0139    Specimen: Blood Updated: 03/06/24 0245     Extra Tube Hold for add-ons.     Comment: Auto resulted.       Light Blue Top [[id number]] Collected: 03/06/24 0139    Specimen: Blood Updated: 03/06/24 0245     Extra Tube Hold for add-ons.     Comment: Auto resulted       Single High Sensitivity Troponin T [[id number]]  (Normal) Collected: 03/06/24  0139    Specimen: Blood Updated: 03/06/24 0232     HS Troponin T 6 ng/L     Narrative:      High Sensitive Troponin T Reference Range:  <14.0 ng/L- Negative Female for AMI  <22.0 ng/L- Negative Male for AMI  >=14 - Abnormal Female indicating possible myocardial injury.  >=22 - Abnormal Male indicating possible myocardial injury.   Clinicians would have to utilize clinical acumen, EKG, Troponin, and serial changes to determine if it is an Acute Myocardial Infarction or myocardial injury due to an underlying chronic condition.         Magnesium [[id number]]  (Normal) Collected: 03/06/24 0139    Specimen: Blood Updated: 03/06/24 0213     Magnesium 1.6 mg/dL     Comprehensive Metabolic Panel [[id number]]  (Abnormal) Collected: 03/06/24 0139    Specimen: Blood Updated: 03/06/24 0212     Glucose 152 mg/dL      BUN 17 mg/dL      Creatinine 0.48 mg/dL      Sodium 115 mmol/L      Potassium 3.5 mmol/L      Chloride 82 mmol/L      CO2 23.0 mmol/L      Calcium 7.9 mg/dL      Total Protein 6.0 g/dL      Albumin 4.0 g/dL      ALT (SGPT) 12 U/L      AST (SGOT) 20 U/L      Alkaline Phosphatase 71 U/L      Total Bilirubin 0.6 mg/dL      Globulin 2.0 gm/dL      Comment: Calculated Result        A/G Ratio 2.0 g/dL      BUN/Creatinine Ratio 35.4     Anion Gap 10.0 mmol/L      eGFR 102.7 mL/min/1.73     Narrative:      GFR Normal >60  Chronic Kidney Disease <60  Kidney Failure <15      Procalcitonin [[id number]]  (Normal) Collected: 03/06/24 0139    Specimen: Blood Updated: 03/06/24 0212     Procalcitonin 0.08 ng/mL     Narrative:      As a Marker for Sepsis (Non-Neonates):    1. <0.5 ng/mL represents a low risk of severe sepsis and/or septic shock.  2. >2 ng/mL represents a high risk of severe sepsis and/or septic shock.    As a Marker for Lower Respiratory Tract Infections that require antibiotic therapy:    PCT on Admission    Antibiotic Therapy       6-12 Hrs later    >0.5                Strongly Recommended  >0.25 - <0.5         "Recommended   0.1 - 0.25          Discouraged              Remeasure/reassess PCT  <0.1                Strongly Discouraged     Remeasure/reassess PCT    As 28 day mortality risk marker: \"Change in Procalcitonin Result\" (>80% or <=80%) if Day 0 (or Day 1) and Day 4 values are available. Refer to [email or web address]    Change in PCT <=80%  A decrease of PCT levels below or equal to 80% defines a positive change in PCT test result representing a higher risk for 28-day all-cause mortality of patients diagnosed with severe sepsis for septic shock.    Change in PCT >80%  A decrease of PCT levels of more than 80% defines a negative change in PCT result representing a lower risk for 28-day all-cause mortality of patients diagnosed with severe sepsis or septic shock.       Lipase [[id number]]  (Normal) Collected: 03/06/24 0139    Specimen: Blood Updated: 03/06/24 0208     Lipase 31 U/L     C-reactive Protein [[id number]]  (Abnormal) Collected: 03/06/24 0139    Specimen: Blood Updated: 03/06/24 0208     C-Reactive Protein 5.16 mg/dL     Lactic Acid, Plasma [[id number]]  (Normal) Collected: 03/06/24 0139    Specimen: Blood Updated: 03/06/24 0202     Lactate 0.9 mmol/L      Comment: Falsely depressed results may occur on samples drawn from patients receiving N-Acetylcysteine (NAC) or Metamizole.       CBC & Differential [[id number]]  (Abnormal) Collected: 03/06/24 0139    Specimen: Blood Updated: 03/06/24 0152    Narrative:      The following orders were created for panel order CBC & Differential.  Procedure                               Abnormality         Status                     ---------                               -----------         ------                     CBC Auto Differential[[id number]]        Abnormal            Final result                 Please view results for these tests on the individual orders.    CBC Auto Differential [[id number]]  (Abnormal) Collected: 03/06/24 0139    Specimen: Blood " Updated: 03/06/24 0152     WBC 2.75 10*3/mm3      RBC 4.01 10*6/mm3      Hemoglobin 11.9 g/dL      Hematocrit 34.2 %      MCV 85.3 fL      MCH 29.7 pg      MCHC 34.8 g/dL      RDW 11.8 %      RDW-SD 37.2 fl      MPV 9.7 fL      Platelets 162 10*3/mm3      Neutrophil % 69.4 %      Lymphocyte % 20.7 %      Monocyte % 9.5 %      Eosinophil % 0.0 %      Basophil % 0.0 %      Immature Grans % 0.4 %      Neutrophils, Absolute 1.91 10*3/mm3      Lymphocytes, Absolute 0.57 10*3/mm3      Monocytes, Absolute 0.26 10*3/mm3      Eosinophils, Absolute 0.00 10*3/mm3      Basophils, Absolute 0.00 10*3/mm3      Immature Grans, Absolute 0.01 10*3/mm3      nRBC 0.0 /100 WBC           Imaging Results (Last 72 Hours)       ** No results found for the last 72 hours. **          Physician Progress Notes (all)    No notes of this type exist for this encounter.       Consult Notes (all)    No notes of this type exist for this encounter.

## 2024-03-07 NOTE — PROGRESS NOTES
"INTENSIVIST   PROGRESS NOTE     Hospital:  LOS: 1 day     Chief Complaint: Hyponatremia     Subjective   S     Interval History: No acute issues since admission.  Patient afebrile, hemodynamically stable.  She endorses a dry, nonproductive cough.    The patient's relevant past medical, surgical and social history were reviewed and updated in Epic as appropriate.      Objective   O     Intake/Ouptut 24 hrs (7:00AM - 6:59 AM)  Intake & Output (last 3 days)         03/04 0701 03/05 0700 03/05 0701 03/06 0700 03/06 0701 03/07 0700 03/07 0701  03/08 0700    P.O.   740     I.V. (mL/kg)   1505.8 (17)     IV Piggyback  2000      Total Intake(mL/kg)  2000 (22.6) 2245.8 (25.4)     Urine (mL/kg/hr)  0 4575 (2.2)     Total Output  0 4575     Net  +2000 -2329.3             Urine Unmeasured Occurrence   1 x      Medications (drips):  dextrose  dextrose, Last Rate: Stopped (03/07/24 0741)    Respiratory Support: Room air    Physical Examination:  Vital Signs: Blood pressure 132/68, pulse 77, temperature 98.1 °F (36.7 °C), temperature source Oral, resp. rate 18, height 175.3 cm (69\"), weight 88.5 kg (195 lb), SpO2 94%, not currently breastfeeding.    General: The patient appears in no acute distress. Alert, cooperative and interactive.  Chest: Clear to auscultation bilaterally, No wheezing, rhonchi, or rales. Normal work of breathing. Equal chest rise.  Cardiac: Regular rhythm, normal rate, S1S2 auscultated. No murmurs, rubs or gallops.   Abdomen: Soft, non-tender, non-distended, positive bowel sounds in all four quadrants.   Extremities: No lower extremity edema. No clubbing or cyanosis.  Neuro: Motor power grossly intact bilaterally. Sensation intact. Speech fluid and fluent. Thought process coherent.  Psych: Alert and oriented x3. Mood stable.    Lines, Drains & Airways       Active LDAs       Name Placement date Placement time Site Days    Peripheral IV 03/06/24 0137 Anterior;Right Forearm 03/06/24 0137  Forearm  1    " External Urinary Catheter 03/06/24  0500  --  1        Results from last 7 days   Lab Units 03/07/24  0357 03/06/24  0301 03/06/24  0139 03/05/24  0835   WBC 10*3/mm3 3.80  --  2.75* 6.37   HEMOGLOBIN g/dL 12.7  --  11.9* 13.1   HEMOGLOBIN, POC g/dL  --  11.2*  --   --    MCV fL 85.2  --  85.3 85.1   PLATELETS 10*3/mm3 184  --  162 200     Results from last 7 days   Lab Units 03/07/24  0543 03/07/24  0357 03/06/24  1620 03/06/24  1421 03/06/24  0301 03/06/24  0139   SODIUM mmol/L 137 136 128* 136   < > 115*   POTASSIUM mmol/L  --  3.4* 3.3* 3.6   < > 3.5   CO2 mmol/L  --  23.0 23.0 24.0   < > 23.0   CREATININE mg/dL  --  0.60 0.57 0.61   < > 0.48*   GLUCOSE mg/dL  --  97 96 99   < > 152*   MAGNESIUM mg/dL  --   --   --   --   --  1.6    < > = values in this interval not displayed.     Estimated Creatinine Clearance: 104.9 mL/min (by C-G formula based on SCr of 0.6 mg/dL).  Results from last 7 days   Lab Units 03/06/24  0139 03/05/24  0835   ALK PHOS U/L 71 79   BILIRUBIN mg/dL 0.6 0.3   ALT (SGPT) U/L 12 13   AST (SGOT) U/L 20 17     Images:  No radiology results for the last day    Results: Reviewed.  I reviewed the patient's new laboratory and imaging results.  I independently reviewed the patient's new images.    Medications: Reviewed.    Assessment & Plan    A / P     Active Hospital Problems    Diagnosis     **Hyponatremia     Influenza A     Diarrhea      Patient Julee is a 69M with past medical history of hypertension and a recent diagnosis of influenza A for which she was started on Tamiflu 2x days prior to admission. She presented to the emergency department (3/06) with generalized weakness, nausea, diarrhea and vomiting.      Labs on admission significant for hyponatremia [115] and she was admitted to the ICU for management.     -Fluid management in the ICU since admission.  Values have normalized over 36-48 hours. Now off fluid with stable numbers. Tolerating PO and asymptomatic. Will continue monitoring  overnight. If stable can likely DC in the next 24 hours. Stable for telemetry transfer. Recheck every 6x hours       -Continue Tamiflu for influenza A infection     -Has Mucinex and Tessalon for acute [nonproductive] cough   -PRN antiemetics for nausea, vomiting    -Can restart home medications as able      F-PO  A-NA  S-NA  T-Ambulate, SCDs  H-Head of bed greater than 30 degrees  U-NA  G-FSBS per unit protocol, correction dose insulin  S-NA  B-Will monitor daily and provide regimen if indicated  I-PIV  D-NA    Advance Directives:   Code Status and Medical Interventions:   Ordered at: 03/06/24 1002     Code Status (Patient has no pulse and is not breathing):    CPR (Attempt to Resuscitate)     Medical Interventions (Patient has pulse or is breathing):    Full Support     High level of risk due to severe exacerbation of chronic illness and illness with threat to life or bodily function.    I conducted multidisciplinary rounds in the plan of care was discussed with the multidisciplinary team at that time. In attendance at multidisciplinary rounds was clinical pharmacist, dietitian, nursing staff and case management.    I discussed the patient's findings and my recommendations with patient and nursing staff.    -- Jean Sheikh MD  Pulmonary/Critical Care

## 2024-03-07 NOTE — PLAN OF CARE
Goal Outcome Evaluation:  Plan of Care Reviewed With: patient           Outcome Evaluation: PT evaluation completed. Pt presenting with below baseline weakness, impaired balance, and limited activity tolerance warranting IP PT services to address deficits and facilitate return to PLOF. Recommend home with assist.      Anticipated Discharge Disposition (PT): home with assist

## 2024-03-07 NOTE — THERAPY EVALUATION
Patient Name: Inez Ladd  : 1955    MRN: [id number]                              Today's Date: 3/7/2024       Admit Date: 3/6/2024    Visit Dx:     ICD-10-CM ICD-9-CM   1. Hyponatremia  E87.1 276.1   2. Nausea vomiting and diarrhea  R11.2 787.91    R19.7 787.01   3. Influenza A  J10.1 487.1   4. Generalized weakness  R53.1 780.79   5. Malaise and fatigue  R53.81 780.79    R53.83    6. Fever and chills  R50.9 780.60   7. Acute cough  R05.1 786.2   8. Neutropenia, unspecified type  D70.9 288.00   9. History of hypertension  Z86.79 V12.59     Patient Active Problem List   Diagnosis    Calculus of parotid gland duct    Calculus of parotid gland duct    Menopause present    Essential hypertension    Encounter for annual routine gynecological examination    Urge incontinence    Cystocele with uterine prolapse    Fitting and adjustment of pessary    Unspecified abnormal cytological findings in specimens from cervix uteri     Impacted cerumen of left ear    Acute pain of right knee    Osteoarthritis of left knee    Acute non-recurrent maxillary sinusitis    Dehydration    Hyponatremia    Influenza A    Diarrhea     Past Medical History:   Diagnosis Date    Acute cystitis     Acute pharyngitis     Body aches     Cancer     skin     Cellulitis     Dysuria     H/O bladder infections     2 SINCE AUGUST, urinary tract infection    History of prolapse of bladder      Bladder disorder    Hypertension     Impacted cerumen     Impacted cerumen, unspecified ear     Infected insect bite of forearm     Menopause     Prophylactic immunotherapy     Separation of AC joint     Right Shoulder     Past Surgical History:   Procedure Laterality Date    CARPAL TUNNEL RELEASE      KNEE SURGERY        General Information       Row Name 24 1110          Physical Therapy Time and Intention    Document Type evaluation  -ND     Mode of Treatment physical therapy  -ND       Row Name 24 1110          General Information     Patient Profile Reviewed yes  -ND     Prior Level of Function independent:;all household mobility;community mobility;gait;transfer;bed mobility;work;using stairs  Pt reports working part-time. No AD use.  -ND     Existing Precautions/Restrictions fall  -ND     Barriers to Rehab medically complex  -ND       Row Name 03/07/24 1110          Living Environment    People in Home sibling(s)  -ND       Row Name 03/07/24 1110          Home Main Entrance    Number of Stairs, Main Entrance one  -ND     Stair Railings, Main Entrance none  -ND       Row Name 03/07/24 1110          Stairs Within Home, Primary    Stairs, Within Home, Primary All needs met on first floor.  -ND     Number of Stairs, Within Home, Primary twelve  -ND       Row Name 03/07/24 1110          Cognition    Orientation Status (Cognition) oriented x 3  -ND       Row Name 03/07/24 1110          Safety Issues, Functional Mobility    Safety Issues Affecting Function (Mobility) awareness of need for assistance;insight into deficits/self-awareness;safety precaution awareness;safety precautions follow-through/compliance  -ND     Impairments Affecting Function (Mobility) balance;strength;endurance/activity tolerance  -ND               User Key  (r) = Recorded By, (t) = Taken By, (c) = Cosigned By      Initials Name Provider Type    ND Lila Cordero, PT Physical Therapist                   Mobility       Row Name 03/07/24 1112          Bed Mobility    Bed Mobility supine-sit  -ND     Supine-Sit [place] (Bed Mobility) standby assist  -ND     Assistive Device (Bed Mobility) bed rails;head of bed elevated  -ND     Comment, (Bed Mobility) Pt reports minor dizziness that resolves quickly.  -ND       Row Name 03/07/24 1112          Sit-Stand Transfer    Sit-Stand [place] (Transfers) contact guard;verbal cues  -ND     Comment, (Sit-Stand Transfer) STS x1 from EOB and x1 from toilet.  -ND       Row Name 03/07/24 1112          Gait/Stairs (Locomotion)     [place] Level (Gait) contact guard  -ND     Assistive Device (Gait) other (see comments)  UE support  -ND     Distance in Feet (Gait) 300  -ND     Deviations/Abnormal Patterns (Gait) flaquito decreased;gait speed decreased;stride length decreased;weight shifting decreased;left sided deviations  -ND     Bilateral Gait Deviations heel strike decreased  -ND     Left Sided Gait Deviations leans left  Intermittently when asked to dual task.  -ND     Comment, (Gait/Stairs) Pt ambulates with step through pattern, decreased speed, and decreased arm swing. Pt intermittently leaning L when asked to dual task while ambulating. Pt initially on 2L with ambulation but progressively weaned to RA and returns to room at 94% on RA.  -ND               User Key  (r) = Recorded By, (t) = Taken By, (c) = Cosigned By      Initials Name Provider Type    Lila Obregon PT Physical Therapist                   Obj/Interventions       Row Name 03/07/24 1117          Range of Motion Comprehensive    General Range of Motion bilateral lower extremity ROM WFL  -ND       Row Name 03/07/24 1117          Strength Comprehensive (MMT)    General Manual Muscle Testing (MMT) Assessment lower extremity strength deficits identified  -ND     Comment, General Manual Muscle Testing (MMT) Assessment BLE MMT 4/5.  -ND       Row Name 03/07/24 1117          Balance    Balance Assessment sitting static balance;sitting dynamic balance;sit to stand dynamic balance;standing static balance;standing dynamic balance  -ND     Static Sitting Balance standby assist  -ND     Dynamic Sitting Balance standby assist  -ND     Position, Sitting Balance unsupported;sitting edge of bed;supported;sitting in chair  -ND     Sit to Stand Dynamic Balance contact guard;verbal cues  -ND     Static Standing Balance contact guard  -ND     Dynamic Standing Balance contact guard  -ND     Position/Device Used, Standing Balance supported;other (see comments)  UE support  -ND      Balance Interventions sitting;standing;sit to stand;supported;static;dynamic;minimal challenge;combined head and eye movements;tandem gait  -ND     Comment, Balance Pt performing marching, tandem gait, and head turns with ambulation and demonstrates a L lean with these tasks but no overt LOB noted.  -ND       Row Name 03/07/24 1117          Sensory Assessment (Somatosensory)    Sensory Assessment (Somatosensory) LE sensation intact  -ND               User Key  (r) = Recorded By, (t) = Taken By, (c) = Cosigned By      Initials Name Provider Type    ND Lila Cordero, PT Physical Therapist                   Goals/Plan       Row Name 03/07/24 1124          Bed Mobility Goal 1 (PT)    Activity/Assistive Device (Bed Mobility Goal 1, PT) sit to supine/supine to sit  -ND     [place] Level/Cues Needed (Bed Mobility Goal 1, PT) independent  -ND     Time Frame (Bed Mobility Goal 1, PT) long term goal (LTG);10 days  -ND       Row Name 03/07/24 1124          Transfer Goal 1 (PT)    Activity/Assistive Device (Transfer Goal 1, PT) sit-to-stand/stand-to-sit;bed-to-chair/chair-to-bed  -ND     [place] Level/Cues Needed (Transfer Goal 1, PT) independent  -ND     Time Frame (Transfer Goal 1, PT) long term goal (LTG);10 days  -ND       Row Name 03/07/24 1124          Gait Training Goal 1 (PT)    Activity/Assistive Device (Gait Training Goal 1, PT) gait (walking locomotion);increase endurance/gait distance;decrease fall risk  -ND     [place] Level (Gait Training Goal 1, PT) independent  -ND     Distance (Gait Training Goal 1, PT) 500  -ND     Time Frame (Gait Training Goal 1, PT) long term goal (LTG);10 days  -ND       Row Name 03/07/24 1124          Stairs Goal 1 (PT)    Activity/Assistive Device (Stairs Goal 1, PT) ascending stairs;descending stairs  -ND     [place] Level/Cues Needed (Stairs Goal 1, PT) standby assist  -ND     Number of Stairs (Stairs Goal 1, PT) 12  -ND     Time Frame (Stairs Goal 1, PT) long  term goal (LTG);10 days  -ND       Row Name 03/07/24 1124          Therapy Assessment/Plan (PT)    Planned Therapy Interventions (PT) balance training;bed mobility training;gait training;transfer training;patient/family education;home exercise program;strengthening  -ND               User Key  (r) = Recorded By, (t) = Taken By, (c) = Cosigned By      Initials Name Provider Type    ND Lila Cordero, PT Physical Therapist                   Clinical Impression       Row Name 03/07/24 1119          Pain    Pretreatment Pain Rating 0/10 - no pain  -ND     Posttreatment Pain Rating 0/10 - no pain  -ND     Pain Intervention(s) Repositioned;Ambulation/increased activity  -ND       Row Name 03/07/24 1119          Plan of Care Review    Plan of Care Reviewed With patient  -ND     Outcome Evaluation PT evaluation completed. Pt presenting with below baseline weakness, impaired balance, and limited activity tolerance warranting IP PT services to address deficits and facilitate return to PLOF. Recommend home with assist.  -ND       Row Name 03/07/24 1119          Therapy Assessment/Plan (PT)    Patient/Family Therapy Goals Statement (PT) Return to PLOF.  -ND     Rehab Potential (PT) good, to achieve stated therapy goals  -ND     Criteria for Skilled Interventions Met (PT) yes;meets criteria;skilled treatment is necessary  -ND     Therapy Frequency (PT) daily  -ND       Row Name 03/07/24 1119          Vital Signs    Pre Systolic BP Rehab 125  -ND     Pre Treatment Diastolic BP 67  -ND     Post Systolic BP Rehab 114  -ND     Post Treatment Diastolic BP 58  -ND     Pretreatment Heart Rate (beats/min) 69  -ND     Posttreatment Heart Rate (beats/min) 79  -ND     Pre SpO2 (%) 96  -ND     O2 Delivery Pre Treatment nasal cannula  -ND     Intra SpO2 (%) 92  Initially 2L and progressing to RA while ambulating.  -ND     O2 Delivery Intra Treatment nasal cannula  -ND     Post SpO2 (%) 93  -ND     O2 Delivery Post Treatment nasal cannula   -ND     Pre Patient Position Supine  -ND     Intra Patient Position Standing  -ND     Post Patient Position Sitting  -ND       Row Name 03/07/24 1119          Positioning and Restraints    Pre-Treatment Position in bed  -ND     Post Treatment Position chair  -ND     In Chair notified nsg;reclined;legs elevated;call light within reach;encouraged to call for assist;exit alarm on;waffle cushion  -ND               User Key  (r) = Recorded By, (t) = Taken By, (c) = Cosigned By      Initials Name Provider Type    Lila Obregon PT Physical Therapist                   Outcome Measures       Row Name 03/07/24 1125          How much help from another person do you currently need...    Turning from your back to your side while in flat bed without using bedrails? 4  -ND     Moving from lying on back to sitting on the side of a flat bed without bedrails? 3  -ND     Moving to and from a bed to a chair (including a wheelchair)? 3  -ND     Standing up from a chair using your arms (e.g., wheelchair, bedside chair)? 3  -ND     Climbing 3-5 steps with a railing? 3  -ND     To walk in hospital room? 3  -ND     AM-PAC 6 Clicks Score (PT) 19  -ND     Highest Level of Mobility Goal 6 --> Walk 10 steps or more  -ND       Row Name 03/07/24 1125          Functional Assessment    Outcome Measure Options AM-PAC 6 Clicks Basic Mobility (PT)  -ND               User Key  (r) = Recorded By, (t) = Taken By, (c) = Cosigned By      Initials Name Provider Type    Lila Obregon PT Physical Therapist                                 Physical Therapy Education       Title: PT OT SLP Therapies (In Progress)       Topic: Physical Therapy (In Progress)       Point: Mobility training (Done)       Learning Progress Summary             Patient Acceptance, E, VU by ND at 3/7/2024 1126                         Point: Home exercise program (Not Started)       Learner Progress:  Not documented in this visit.              Point: Body mechanics  (Done)       Learning Progress Summary             Patient Acceptance, E, VU by ND at 3/7/2024 1126                         Point: Precautions (Done)       Learning Progress Summary             Patient Acceptance, E, VU by ND at 3/7/2024 1126                                         User Key       Initials Effective Dates Name Provider Type Discipline    ND 11/16/23 -  Lila Cordero, PT Physical Therapist PT                  PT Recommendation and Plan  Planned Therapy Interventions (PT): balance training, bed mobility training, gait training, transfer training, patient/family education, home exercise program, strengthening  Plan of Care Reviewed With: patient  Outcome Evaluation: PT evaluation completed. Pt presenting with below baseline weakness, impaired balance, and limited activity tolerance warranting IP PT services to address deficits and facilitate return to PLOF. Recommend home with assist.     Time Calculation:   PT Evaluation Complexity  History, PT Evaluation Complexity: 3 or more personal factors and/or comorbidities  Examination of Body Systems (PT Eval Complexity): total of 4 or more elements  Clinical Presentation (PT Evaluation Complexity): evolving  Clinical Decision Making (PT Evaluation Complexity): moderate complexity  Overall Complexity (PT Evaluation Complexity): moderate complexity     PT Charges       Row Name 03/07/24 1126             Time Calculation    Start Time 1007  -ND      PT Received On 03/07/24  -ND      PT Goal Re-Cert Due Date 03/17/24  -ND         Untimed Charges    PT Eval/Re-eval Minutes 50  -ND         Total Minutes    Untimed Charges Total Minutes 50  -ND       Total Minutes 50  -ND                User Key  (r) = Recorded By, (t) = Taken By, (c) = Cosigned By      Initials Name Provider Type    ND Lila Cordero, PT Physical Therapist                  Therapy Charges for Today       Code Description Service Date Service Provider Modifiers Qty    51055128968 HC PT EVAL  MOD COMPLEXITY 4 3/7/2024 Lila Cordero, PT GP 1            PT G-Codes  Outcome Measure Options: AM-PAC 6 Clicks Basic Mobility (PT)  AM-PAC 6 Clicks Score (PT): 19  PT Discharge Summary  Anticipated Discharge Disposition (PT): home with assist    Lila Cordero, PT  3/7/2024

## 2024-03-07 NOTE — PLAN OF CARE
Goal Outcome Evaluation:  Patient reports feeling better than she has in a week. Vital signs stable, 4L nasal cannula on when sleeping due to possible sleep apnea, otherwise on room air. Did complain of dry cough disrupting sleep, benzonatate pearls ordered per STEVE Cassidy. Patient able to ambulate to the bathroom with stand by.

## 2024-03-08 ENCOUNTER — READMISSION MANAGEMENT (OUTPATIENT)
Dept: CALL CENTER | Facility: HOSPITAL | Age: 69
End: 2024-03-08
Payer: MEDICARE

## 2024-03-08 VITALS
HEIGHT: 69 IN | HEART RATE: 85 BPM | SYSTOLIC BLOOD PRESSURE: 122 MMHG | WEIGHT: 195 LBS | DIASTOLIC BLOOD PRESSURE: 72 MMHG | TEMPERATURE: 98.5 F | RESPIRATION RATE: 18 BRPM | BODY MASS INDEX: 28.88 KG/M2 | OXYGEN SATURATION: 93 %

## 2024-03-08 LAB
ANION GAP SERPL CALCULATED.3IONS-SCNC: 11 MMOL/L (ref 5–15)
BUN SERPL-MCNC: 11 MG/DL (ref 8–23)
BUN/CREAT SERPL: 19.3 (ref 7–25)
CALCIUM SPEC-SCNC: 8.9 MG/DL (ref 8.6–10.5)
CHLORIDE SERPL-SCNC: 95 MMOL/L (ref 98–107)
CO2 SERPL-SCNC: 26 MMOL/L (ref 22–29)
CREAT SERPL-MCNC: 0.57 MG/DL (ref 0.57–1)
DEPRECATED RDW RBC AUTO: 39 FL (ref 37–54)
EGFRCR SERPLBLD CKD-EPI 2021: 98.5 ML/MIN/1.73
ERYTHROCYTE [DISTWIDTH] IN BLOOD BY AUTOMATED COUNT: 12.1 % (ref 12.3–15.4)
GLUCOSE SERPL-MCNC: 93 MG/DL (ref 65–99)
HCT VFR BLD AUTO: 37.5 % (ref 34–46.6)
HGB BLD-MCNC: 12.6 G/DL (ref 12–15.9)
MAGNESIUM SERPL-MCNC: 1.8 MG/DL (ref 1.6–2.4)
MCH RBC QN AUTO: 29.5 PG (ref 26.6–33)
MCHC RBC AUTO-ENTMCNC: 33.6 G/DL (ref 31.5–35.7)
MCV RBC AUTO: 87.8 FL (ref 79–97)
PLATELET # BLD AUTO: 187 10*3/MM3 (ref 140–450)
PMV BLD AUTO: 9.6 FL (ref 6–12)
POTASSIUM SERPL-SCNC: 3.4 MMOL/L (ref 3.5–5.2)
RBC # BLD AUTO: 4.27 10*6/MM3 (ref 3.77–5.28)
SODIUM SERPL-SCNC: 132 MMOL/L (ref 136–145)
SODIUM SERPL-SCNC: 134 MMOL/L (ref 136–145)
SODIUM SERPL-SCNC: 134 MMOL/L (ref 136–145)
WBC NRBC COR # BLD AUTO: 5.15 10*3/MM3 (ref 3.4–10.8)

## 2024-03-08 PROCEDURE — 83735 ASSAY OF MAGNESIUM: CPT | Performed by: INTERNAL MEDICINE

## 2024-03-08 PROCEDURE — 99238 HOSP IP/OBS DSCHRG MGMT 30/<: CPT | Performed by: INTERNAL MEDICINE

## 2024-03-08 PROCEDURE — 80048 BASIC METABOLIC PNL TOTAL CA: CPT | Performed by: INTERNAL MEDICINE

## 2024-03-08 PROCEDURE — 85027 COMPLETE CBC AUTOMATED: CPT | Performed by: INTERNAL MEDICINE

## 2024-03-08 PROCEDURE — 84295 ASSAY OF SERUM SODIUM: CPT | Performed by: INTERNAL MEDICINE

## 2024-03-08 RX ORDER — BENZONATATE 100 MG/1
100 CAPSULE ORAL 3 TIMES DAILY PRN
Qty: 30 CAPSULE | Refills: 0 | Status: SHIPPED | OUTPATIENT
Start: 2024-03-08

## 2024-03-08 RX ORDER — GUAIFENESIN 600 MG/1
600 TABLET, EXTENDED RELEASE ORAL EVERY 12 HOURS SCHEDULED
Qty: 20 TABLET | Refills: 0 | Status: SHIPPED | OUTPATIENT
Start: 2024-03-08

## 2024-03-08 RX ORDER — OSELTAMIVIR PHOSPHATE 75 MG/1
75 CAPSULE ORAL EVERY 12 HOURS SCHEDULED
Start: 2024-03-08 | End: 2024-03-09

## 2024-03-08 RX ADMIN — ACETAMINOPHEN 650 MG: 325 TABLET ORAL at 03:46

## 2024-03-08 RX ADMIN — OSELTAMIVIR PHOSPHATE 75 MG: 75 CAPSULE ORAL at 09:10

## 2024-03-08 RX ADMIN — BENZONATATE 100 MG: 100 CAPSULE ORAL at 09:18

## 2024-03-08 RX ADMIN — GUAIFENESIN 600 MG: 600 TABLET, EXTENDED RELEASE ORAL at 09:11

## 2024-03-08 NOTE — DISCHARGE SUMMARY
[facility] Medicine Services  DISCHARGE SUMMARY    Patient Name: Inez Ladd  : 1955  MRN: [id number]    Date of Admission: 3/6/2024  1:10 AM  Date of Discharge:  3/8/2024  Primary Care Physician: Curt Sewell DO    Consults       No orders found from 2024 to 3/7/2024.            Hospital Course     Presenting Problem: hyponatremia    Active Hospital Problems    Diagnosis  POA    **Hyponatremia [E87.1]  Unknown    Influenza A [J10.1]  Unknown    Diarrhea [R19.7]  Unknown      Resolved Hospital Problems   No resolved problems to display.          Hospital Course:  Inez Ladd is a 69 y.o. female  with past medical history of hypertension and a recent diagnosis of influenza A for which she was started on Tamiflu 2x days prior to admission presented to the ED on 3/6 with complaints of N/V/D as well as generalized weakness. She was found to be significantly hyponatremic with initial sodium of 115.  She was admitted to the ICU and started on IVFs.  Her Na+ was closely monitored and increased appropriately while in the ICU. Her numbers have since been stable and she was transferred to telemetry and to our service. She has been off IVFs and tolerating PO intake well.  She will be discharged home today and will continue her course of Tamiflu (to complete today).         Discharge Follow Up Recommendations for outpatient labs/diagnostics:   Follow up with PCP within one week     Day of Discharge     HPI:   Still having a cough but feels well otherwise. Wants to know how long she should be in isolation as she lives with her sister who she doesn't want to get sick.     Review of Systems  Gen- No fevers, chills  CV- No chest pain, palpitations  Resp- as above  GI- No N/V/D, abd pain     Vital Signs:   Temp:  [97.7 °F (36.5 °C)-98.7 °F (37.1 °C)] 98.7 °F (37.1 °C)  Heart Rate:  [76-90] 89  Resp:  [16-18] 18  BP: (110-126)/(59-74) 110/64      Physical Exam:  Constitutional: No acute  distress, awake, alert  HENT: NCAT, mucous membranes moist  Respiratory: Clear to auscultation bilaterally, respiratory effort normal   Cardiovascular: RRR, no murmurs, rubs, or gallops  Gastrointestinal: Positive bowel sounds, soft, nontender, nondistended  Musculoskeletal: No bilateral ankle edema  Psychiatric: Appropriate affect, cooperative  Neurologic: Oriented x 3, strength symmetric in all extremities, Cranial Nerves grossly intact to confrontation, speech clear  Skin: No rashes     Pertinent  and/or Most Recent Results     LAB RESULTS:      Lab 03/08/24  0752 03/07/24  0357 03/06/24  0301 03/06/24  0139 03/05/24  0835   WBC 5.15 3.80  --  2.75* 6.37   HEMOGLOBIN 12.6 12.7  --  11.9* 13.1   HEMOGLOBIN, POC  --   --  11.2*  --   --    HEMATOCRIT 37.5 38.0  --  34.2 39.4   HEMATOCRIT POC  --   --  33*  --   --    PLATELETS 187 184  --  162 200   NEUTROS ABS  --  2.53  --  1.91  --    IMMATURE GRANS (ABS)  --  0.01  --  0.01  --    LYMPHS ABS  --  0.95  --  0.57*  --    MONOS ABS  --  0.29  --  0.26  --    EOS ABS  --  0.01  --  0.00  --    MCV 87.8 85.2  --  85.3 85.1   CRP  --   --   --  5.16*  --    PROCALCITONIN  --   --   --  0.08  --    LACTATE  --   --   --  0.9  --          Lab 03/08/24  0752 03/07/24  2358 03/07/24  1813 03/07/24  1540 03/07/24  1208 03/07/24  0543 03/07/24  0357 03/06/24  1620 03/06/24  1421 03/06/24  0819 03/06/24  0301 03/06/24  0139   SODIUM 132* 134* 136 137 134*   < > 136 128* 136 124*   < > 115*   POTASSIUM 3.4*  --   --   --   --   --  3.4* 3.3* 3.6 3.6   < > 3.5   CHLORIDE 95*  --   --   --   --   --  102 94* 100 91*   < > 82*   CO2 26.0  --   --   --   --   --  23.0 23.0 24.0 21.0*   < > 23.0   ANION GAP 11.0  --   --   --   --   --  11.0 11.0 12.0 12.0   < > 10.0   BUN 11  --   --   --   --   --  9 11 11 12   < > 17   CREATININE 0.57  --   --   --   --   --  0.60 0.57 0.61 0.50*   < > 0.48*   EGFR 98.5  --   --   --   --   --  97.3 98.5 96.9 101.7   < > 102.7   GLUCOSE 93  --    --   --   --   --  97 96 99 113*   < > 152*   CALCIUM 8.9  --   --   --   --   --  8.5* 8.8 8.9 8.3*   < > 7.9*   MAGNESIUM 1.8  --   --   --   --   --   --   --   --   --   --  1.6    < > = values in this interval not displayed.         Lab 03/06/24  0139 03/05/24  0835   TOTAL PROTEIN 6.0 6.8   ALBUMIN 4.0 4.4   GLOBULIN 2.0 2.4   ALT (SGPT) 12 13   AST (SGOT) 20 17   BILIRUBIN 0.6 0.3   ALK PHOS 71 79   LIPASE 31  --          Lab 03/06/24  0139   HSTROP T 6                 Brief Urine Lab Results  (Last result in the past 365 days)        Color   Clarity   Blood   Leuk Est   Nitrite   Protein   CREAT   Urine HCG        03/06/24 0352 Yellow   Clear   Negative   Negative   Negative   Negative                 Microbiology Results (last 10 days)       ** No results found for the last 240 hours. **            XR Chest PA & Lateral (In Office)    Result Date: 3/5/2024  XR CHEST PA AND LATERAL Date of Exam: 3/5/2024 8:54 AM EST Indication: cough Comparison: CT 12/31/2018. Findings: Some subtle bronchial wall thickening is present likely reflecting viral or reactive airways disease such as bronchitis. There is otherwise no focal consolidation. Lower lung predominant bronchiectasis is noted as well. There is no effusion or pneumothorax. Unremarkable heart and mediastinal contours. Mild thoracic spondylosis changes are evident.     Impression: Some subtle bronchial wall thickening is present likely reflecting viral or reactive airways disease such as bronchitis. There is otherwise no focal consolidation. Lower lung predominant bronchiectasis is noted as well. Electronically Signed: Tobias Ruffin MD  3/5/2024 4:08 PM EST  Workstation ID: [id number]                 Plan for Follow-up of Pending Labs/Results:     Discharge Details        Discharge Medications        New Medications        Instructions Start Date   benzonatate 100 MG capsule  Commonly known as: TESSALON   100 mg, Oral, 3 Times Daily PRN      guaiFENesin 600  MG 12 hr tablet  Commonly known as: MUCINEX   600 mg, Oral, Every 12 Hours Scheduled             Changes to Medications        Instructions Start Date   oseltamivir 75 MG capsule  Commonly known as: TAMIFLU  What changed: when to take this   75 mg, Oral, Every 12 Hours Scheduled             Continue These Medications        Instructions Start Date   acetaminophen 500 MG tablet  Commonly known as: TYLENOL   500 mg, Oral, Every 6 Hours PRN      amLODIPine 5 MG tablet  Commonly known as: NORVASC   5 mg, Oral, Daily      estradiol 0.1 MG/GM vaginal cream  Commonly known as: ESTRACE VAGINAL   Insert 1 gm intravaginally 3 times each week for 3 weeks and then weekly thereafter      ipratropium 0.03 % nasal spray  Commonly known as: ATROVENT   2 sprays, Nasal, Every 12 Hours      ketotifen 0.025 % ophthalmic solution  Commonly known as: ZADITOR   1 drop, Left Eye, 4 Times Daily      lisinopril 5 MG tablet  Commonly known as: PRINIVIL,ZESTRIL   5 mg, Oral, Daily      ondansetron ODT 4 MG disintegrating tablet  Commonly known as: ZOFRAN-ODT   4 mg, Translingual, Every 8 Hours PRN      trimethoprim-polymyxin b 28999-4.1 UNIT/ML-% ophthalmic solution  Commonly known as: POLYTRIM   1 drop, Left Eye, Every 6 Hours             Stop These Medications      amoxicillin-clavulanate 875-125 MG per tablet  Commonly known as: AUGMENTIN     cetirizine 5 MG tablet  Commonly known as: zyrTEC     meloxicam 15 MG tablet  Commonly known as: MOBIC              No Known Allergies      Discharge Disposition:  Home or Self Care    Diet:  Hospital:  Diet Order   Procedures    Diet: Regular/House; Fluid Consistency: Thin (IDDSI 0)            Activity:      Restrictions or Other Recommendations:         CODE STATUS:    Code Status and Medical Interventions:   Ordered at: 03/06/24 1002     Code Status (Patient has no pulse and is not breathing):    CPR (Attempt to Resuscitate)     Medical Interventions (Patient has pulse or is breathing):    Full  Support       Future Appointments   Date Time Provider Department Center   3/19/2024  9:40 AM Grace Tucker MD [facility]   5/28/2024  2:00 PM Curt Sewell DO [facility]       Additional Instructions for the Follow-ups that You Need to Schedule       Discharge Follow-up with PCP   As directed       Currently Documented PCP:    Curt Sewell DO    PCP Phone Number:    [phone]     Follow Up Details: in one week with PCP                      Jailene Rasmussen MD  03/08/24      Time Spent on Discharge:  I spent  25  minutes on this discharge activity which included: face-to-face encounter with the patient, reviewing the data in the system, coordination of the care with the nursing staff as well as consultants, documentation, and entering orders.

## 2024-03-08 NOTE — PAYOR COMM NOTE
"Ref# [id number]   Discharge Summary    Utilization Review  Phone [phone]  Fax [phone]    [place] [address]     Inez Ludwig (69 y.o. Female)       Date of Birth   1955    Social Security Number       Address   [address]  [facility] [address]    Home Phone   [phone]    MRN   [id number]       Hindu   Gnosticism    Marital Status                               Admission Date   3/6/24    Admission Type   Emergency    Admitting Provider   Jailene Rasmussen MD    Attending Provider       Department, Room/Bed   [facility] 4G, S454/1       Discharge Date   3/8/2024    Discharge Disposition   Home or Self Care    Discharge Destination                                 Attending Provider: (none)   Allergies: No Known Allergies    Isolation: None   Infection: None   Code Status: Prior    Ht: 175.3 cm (69\")   Wt: 88.5 kg (195 lb)    Admission Cmt: None   Principal Problem: Hyponatremia [E87.1]                   Active Insurance as of 3/6/2024       Primary Coverage       Payor Plan Insurance Group Employer/Plan Group    ANTHEM MEDICARE REPLACEMENT ANTHEM MEDICARE ADVANTAGE [id number]       Payor Plan Address Payor Plan Phone Number Payor Plan Fax Number Effective Dates    [address] [phone]  1/1/2024 - None Entered    [facility] [id number]         Subscriber Name Subscriber Birth Date Member ID       INEZ LUDWIG 1955 [id number]                     Emergency Contacts        (Rel.) Home Phone Work Phone Mobile Phone    HaywoodCristy adam (Daughter) -- -- [phone]    CarolinaRavi (Sister) [phone] -- --    JessicaBrandon (Son) [phone] -- --                 Physician Progress Notes (last 72 hours)        Anthony Sheikh MD at 03/07/24 0904            INTENSIVIST   PROGRESS NOTE     Hospital:  LOS: 1 day     Chief Complaint: Hyponatremia     Subjective   S     Interval " "History: No acute issues since admission.  Patient afebrile, hemodynamically stable.  She endorses a dry, nonproductive cough.    The patient's relevant past medical, surgical and social history were reviewed and updated in Epic as appropriate.      Objective   O     Intake/Ouptut 24 hrs (7:00AM - 6:59 AM)  Intake & Output (last 3 days)         03/04 0701 03/05 0700 03/05 0701  03/06 0700 03/06 0701 03/07 0700 03/07 0701  03/08 0700    P.O.   740     I.V. (mL/kg)   1505.8 (17)     IV Piggyback  2000      Total Intake(mL/kg)  2000 (22.6) 2245.8 (25.4)     Urine (mL/kg/hr)  0 4575 (2.2)     Total Output  0 4575     Net  +2000 -2329.3             Urine Unmeasured Occurrence   1 x      Medications (drips):  dextrose  dextrose, Last Rate: Stopped (03/07/24 0741)    Respiratory Support: Room air    Physical Examination:  Vital Signs: Blood pressure 132/68, pulse 77, temperature 98.1 °F (36.7 °C), temperature source Oral, resp. rate 18, height 175.3 cm (69\"), weight 88.5 kg (195 lb), SpO2 94%, not currently breastfeeding.    General: The patient appears in no acute distress. Alert, cooperative and interactive.  Chest: Clear to auscultation bilaterally, No wheezing, rhonchi, or rales. Normal work of breathing. Equal chest rise.  Cardiac: Regular rhythm, normal rate, S1S2 auscultated. No murmurs, rubs or gallops.   Abdomen: Soft, non-tender, non-distended, positive bowel sounds in all four quadrants.   Extremities: No lower extremity edema. No clubbing or cyanosis.  Neuro: Motor power grossly intact bilaterally. Sensation intact. Speech fluid and fluent. Thought process coherent.  Psych: Alert and oriented x3. Mood stable.    Lines, Drains & Airways       Active LDAs       Name Placement date Placement time Site Days    Peripheral IV 03/06/24 0137 Anterior;Right Forearm 03/06/24  0137  Forearm  1    External Urinary Catheter 03/06/24  0500  --  1        Results from last 7 days   Lab Units 03/07/24  0357 03/06/24  0301 " 03/06/24  0139 03/05/24  0835   WBC 10*3/mm3 3.80  --  2.75* 6.37   HEMOGLOBIN g/dL 12.7  --  11.9* 13.1   HEMOGLOBIN, POC g/dL  --  11.2*  --   --    MCV fL 85.2  --  85.3 85.1   PLATELETS 10*3/mm3 184  --  162 200     Results from last 7 days   Lab Units 03/07/24  0543 03/07/24  0357 03/06/24  1620 03/06/24  1421 03/06/24  0301 03/06/24  0139   SODIUM mmol/L 137 136 128* 136   < > 115*   POTASSIUM mmol/L  --  3.4* 3.3* 3.6   < > 3.5   CO2 mmol/L  --  23.0 23.0 24.0   < > 23.0   CREATININE mg/dL  --  0.60 0.57 0.61   < > 0.48*   GLUCOSE mg/dL  --  97 96 99   < > 152*   MAGNESIUM mg/dL  --   --   --   --   --  1.6    < > = values in this interval not displayed.     Estimated Creatinine Clearance: 104.9 mL/min (by C-G formula based on SCr of 0.6 mg/dL).  Results from last 7 days   Lab Units 03/06/24  0139 03/05/24  0835   ALK PHOS U/L 71 79   BILIRUBIN mg/dL 0.6 0.3   ALT (SGPT) U/L 12 13   AST (SGOT) U/L 20 17     Images:  No radiology results for the last day    Results: Reviewed.  I reviewed the patient's new laboratory and imaging results.  I independently reviewed the patient's new images.    Medications: Reviewed.    Assessment & Plan    A / P     Active Hospital Problems    Diagnosis     **Hyponatremia     Influenza A     Diarrhea      Patient Julee is a 69M with past medical history of hypertension and a recent diagnosis of influenza A for which she was started on Tamiflu 2x days prior to admission. She presented to the emergency department (3/06) with generalized weakness, nausea, diarrhea and vomiting.      Labs on admission significant for hyponatremia [115] and she was admitted to the ICU for management.     -Fluid management in the ICU since admission.  Values have normalized over 36-48 hours. Now off fluid with stable numbers. Tolerating PO and asymptomatic. Will continue monitoring overnight. If stable can likely DC in the next 24 hours. Stable for telemetry transfer. Recheck every 6x hours        -Continue Tamiflu for influenza A infection     -Has Mucinex and Tessalon for acute [nonproductive] cough   -PRN antiemetics for nausea, vomiting    -Can restart home medications as able      F-PO  A-NA  S-NA  T-Ambulate, SCDs  H-Head of bed greater than 30 degrees  U-NA  G-FSBS per unit protocol, correction dose insulin  S-NA  B-Will monitor daily and provide regimen if indicated  I-PIV  D-NA    Advance Directives:   Code Status and Medical Interventions:   Ordered at: 24 1002     Code Status (Patient has no pulse and is not breathing):    CPR (Attempt to Resuscitate)     Medical Interventions (Patient has pulse or is breathing):    Full Support     High level of risk due to severe exacerbation of chronic illness and illness with threat to life or bodily function.    I conducted multidisciplinary rounds in the plan of care was discussed with the multidisciplinary team at that time. In attendance at multidisciplinary rounds was clinical pharmacist, dietitian, nursing staff and case management.    I discussed the patient's findings and my recommendations with patient and nursing staff.    -- Jean Sheikh MD  Pulmonary/Critical Care     Electronically signed by Anthony Sheikh MD at 24 1805       Consult Notes (last 72 hours)  Notes from 24 1553 through 24 1553   No notes of this type exist for this encounter.          Discharge Summary        Jailene Rasmussen MD at 24 0500              [facility] Medicine Services  DISCHARGE SUMMARY    Patient Name: Inez Ladd  : 1955  MRN: [id number]    Date of Admission: 3/6/2024  1:10 AM  Date of Discharge:  3/8/2024  Primary Care Physician: Curt Sewell DO    Consults       No orders found from 2024 to 3/7/2024.            Hospital Course     Presenting Problem: hyponatremia    Active Hospital Problems    Diagnosis  POA    **Hyponatremia [E87.1]  Unknown    Influenza A [J10.1]  Unknown    Diarrhea  [R19.7]  Unknown      Resolved Hospital Problems   No resolved problems to display.          Hospital Course:  Inez Ladd is a 69 y.o. female  with past medical history of hypertension and a recent diagnosis of influenza A for which she was started on Tamiflu 2x days prior to admission presented to the ED on 3/6 with complaints of N/V/D as well as generalized weakness. She was found to be significantly hyponatremic with initial sodium of 115.  She was admitted to the ICU and started on IVFs.  Her Na+ was closely monitored and increased appropriately while in the ICU. Her numbers have since been stable and she was transferred to telemetry and to our service. She has been off IVFs and tolerating PO intake well.  She will be discharged home today and will continue her course of Tamiflu (to complete today).         Discharge Follow Up Recommendations for outpatient labs/diagnostics:   Follow up with PCP within one week     Day of Discharge     HPI:   Still having a cough but feels well otherwise. Wants to know how long she should be in isolation as she lives with her sister who she doesn't want to get sick.     Review of Systems  Gen- No fevers, chills  CV- No chest pain, palpitations  Resp- as above  GI- No N/V/D, abd pain     Vital Signs:   Temp:  [97.7 °F (36.5 °C)-98.7 °F (37.1 °C)] 98.7 °F (37.1 °C)  Heart Rate:  [76-90] 89  Resp:  [16-18] 18  BP: (110-126)/(59-74) 110/64      Physical Exam:  Constitutional: No acute distress, awake, alert  HENT: NCAT, mucous membranes moist  Respiratory: Clear to auscultation bilaterally, respiratory effort normal   Cardiovascular: RRR, no murmurs, rubs, or gallops  Gastrointestinal: Positive bowel sounds, soft, nontender, nondistended  Musculoskeletal: No bilateral ankle edema  Psychiatric: Appropriate affect, cooperative  Neurologic: Oriented x 3, strength symmetric in all extremities, Cranial Nerves grossly intact to confrontation, speech clear  Skin: No rashes      Pertinent  and/or Most Recent Results     LAB RESULTS:      Lab 03/08/24  0752 03/07/24  0357 03/06/24  0301 03/06/24  0139 03/05/24  0835   WBC 5.15 3.80  --  2.75* 6.37   HEMOGLOBIN 12.6 12.7  --  11.9* 13.1   HEMOGLOBIN, POC  --   --  11.2*  --   --    HEMATOCRIT 37.5 38.0  --  34.2 39.4   HEMATOCRIT POC  --   --  33*  --   --    PLATELETS 187 184  --  162 200   NEUTROS ABS  --  2.53  --  1.91  --    IMMATURE GRANS (ABS)  --  0.01  --  0.01  --    LYMPHS ABS  --  0.95  --  0.57*  --    MONOS ABS  --  0.29  --  0.26  --    EOS ABS  --  0.01  --  0.00  --    MCV 87.8 85.2  --  85.3 85.1   CRP  --   --   --  5.16*  --    PROCALCITONIN  --   --   --  0.08  --    LACTATE  --   --   --  0.9  --          Lab 03/08/24  0752 03/07/24  2358 03/07/24  1813 03/07/24  1540 03/07/24  1208 03/07/24  0543 03/07/24  0357 03/06/24  1620 03/06/24  1421 03/06/24  0819 03/06/24  0301 03/06/24  0139   SODIUM 132* 134* 136 137 134*   < > 136 128* 136 124*   < > 115*   POTASSIUM 3.4*  --   --   --   --   --  3.4* 3.3* 3.6 3.6   < > 3.5   CHLORIDE 95*  --   --   --   --   --  102 94* 100 91*   < > 82*   CO2 26.0  --   --   --   --   --  23.0 23.0 24.0 21.0*   < > 23.0   ANION GAP 11.0  --   --   --   --   --  11.0 11.0 12.0 12.0   < > 10.0   BUN 11  --   --   --   --   --  9 11 11 12   < > 17   CREATININE 0.57  --   --   --   --   --  0.60 0.57 0.61 0.50*   < > 0.48*   EGFR 98.5  --   --   --   --   --  97.3 98.5 96.9 101.7   < > 102.7   GLUCOSE 93  --   --   --   --   --  97 96 99 113*   < > 152*   CALCIUM 8.9  --   --   --   --   --  8.5* 8.8 8.9 8.3*   < > 7.9*   MAGNESIUM 1.8  --   --   --   --   --   --   --   --   --   --  1.6    < > = values in this interval not displayed.         Lab 03/06/24 0139 03/05/24  0835   TOTAL PROTEIN 6.0 6.8   ALBUMIN 4.0 4.4   GLOBULIN 2.0 2.4   ALT (SGPT) 12 13   AST (SGOT) 20 17   BILIRUBIN 0.6 0.3   ALK PHOS 71 79   LIPASE 31  --          Lab 03/06/24 0139   HSTROP T 6                 Brief  Urine Lab Results  (Last result in the past 365 days)        Color   Clarity   Blood   Leuk Est   Nitrite   Protein   CREAT   Urine HCG        03/06/24 0352 Yellow   Clear   Negative   Negative   Negative   Negative                 Microbiology Results (last 10 days)       ** No results found for the last 240 hours. **            XR Chest PA & Lateral (In Office)    Result Date: 3/5/2024  XR CHEST PA AND LATERAL Date of Exam: 3/5/2024 8:54 AM EST Indication: cough Comparison: CT 12/31/2018. Findings: Some subtle bronchial wall thickening is present likely reflecting viral or reactive airways disease such as bronchitis. There is otherwise no focal consolidation. Lower lung predominant bronchiectasis is noted as well. There is no effusion or pneumothorax. Unremarkable heart and mediastinal contours. Mild thoracic spondylosis changes are evident.     Impression: Some subtle bronchial wall thickening is present likely reflecting viral or reactive airways disease such as bronchitis. There is otherwise no focal consolidation. Lower lung predominant bronchiectasis is noted as well. Electronically Signed: Tobias Ruffin MD  3/5/2024 4:08 PM EST  Workstation ID: [id number]                 Plan for Follow-up of Pending Labs/Results:     Discharge Details        Discharge Medications        New Medications        Instructions Start Date   benzonatate 100 MG capsule  Commonly known as: TESSALON   100 mg, Oral, 3 Times Daily PRN      guaiFENesin 600 MG 12 hr tablet  Commonly known as: MUCINEX   600 mg, Oral, Every 12 Hours Scheduled             Changes to Medications        Instructions Start Date   oseltamivir 75 MG capsule  Commonly known as: TAMIFLU  What changed: when to take this   75 mg, Oral, Every 12 Hours Scheduled             Continue These Medications        Instructions Start Date   acetaminophen 500 MG tablet  Commonly known as: TYLENOL   500 mg, Oral, Every 6 Hours PRN      amLODIPine 5 MG tablet  Commonly known as:  NORVASC   5 mg, Oral, Daily      estradiol 0.1 MG/GM vaginal cream  Commonly known as: ESTRACE VAGINAL   Insert 1 gm intravaginally 3 times each week for 3 weeks and then weekly thereafter      ipratropium 0.03 % nasal spray  Commonly known as: ATROVENT   2 sprays, Nasal, Every 12 Hours      ketotifen 0.025 % ophthalmic solution  Commonly known as: ZADITOR   1 drop, Left Eye, 4 Times Daily      lisinopril 5 MG tablet  Commonly known as: PRINIVIL,ZESTRIL   5 mg, Oral, Daily      ondansetron ODT 4 MG disintegrating tablet  Commonly known as: ZOFRAN-ODT   4 mg, Translingual, Every 8 Hours PRN      trimethoprim-polymyxin b 19462-3.1 UNIT/ML-% ophthalmic solution  Commonly known as: POLYTRIM   1 drop, Left Eye, Every 6 Hours             Stop These Medications      amoxicillin-clavulanate 875-125 MG per tablet  Commonly known as: AUGMENTIN     cetirizine 5 MG tablet  Commonly known as: zyrTEC     meloxicam 15 MG tablet  Commonly known as: MOBIC              No Known Allergies      Discharge Disposition:  Home or Self Care    Diet:  Hospital:  Diet Order   Procedures    Diet: Regular/House; Fluid Consistency: Thin (IDDSI 0)            Activity:      Restrictions or Other Recommendations:         CODE STATUS:    Code Status and Medical Interventions:   Ordered at: 03/06/24 1002     Code Status (Patient has no pulse and is not breathing):    CPR (Attempt to Resuscitate)     Medical Interventions (Patient has pulse or is breathing):    Full Support       Future Appointments   Date Time Provider Department Center   3/19/2024  9:40 AM Grace Tucker MD [facility]   5/28/2024  2:00 PM Curt Sewell DO [facility]       Additional Instructions for the Follow-ups that You Need to Schedule       Discharge Follow-up with PCP   As directed       Currently Documented PCP:    Curt Sewell DO    PCP Phone Number:    [phone]     Follow Up Details: in one week with PCP                      Jailene Bartholomew  MD [place]  03/08/24      Time Spent on Discharge:  I spent  25  minutes on this discharge activity which included: face-to-face encounter with the patient, reviewing the data in the system, coordination of the care with the nursing staff as well as consultants, documentation, and entering orders.           Electronically signed by Jailene Mcleod MD at 03/08/24 1059       Discharge Order (From admission, onward)       Start     Ordered    03/08/24 1056  Discharge patient  Once        Expected Discharge Date: 03/08/24   Discharge Disposition: Home or Self Care   Physician of Record for Attribution - Please select from Treatment Team: JAILENE MCLEOD [[id number]]   Review needed by CMO to determine Physician of Record: No      Question Answer Comment   Physician of Record for Attribution - Please select from Treatment Team JAILENE MCLEOD    Review needed by CMO to determine Physician of Record No        03/08/24 1058

## 2024-03-08 NOTE — CASE MANAGEMENT/SOCIAL WORK
Discharge Planning Assessment  [facility]     Patient Name: Inez Ladd  MRN: [id number]  Today's Date: 3/8/2024    Admit Date: 3/6/2024    Plan: HOME   Discharge Needs Assessment    No documentation.                  Discharge Plan       Row Name 03/08/24 1121       Plan    Plan HOME    Patient/Family in Agreement with Plan yes    Plan Comments Met with pt at bedside to f/u DCP.  Plan is home today.  No immediate needs identified/voiced.  CM will cont to follow.    Final Discharge Disposition Code 01 - home or self-care                  Continued Care and Services - Admitted Since 3/6/2024    No active coordination exists for this encounter.       Expected Discharge Date and Time       Expected Discharge Date Expected Discharge Time    Mar 8, 2024            Demographic Summary    No documentation.                  Functional Status    No documentation.                  Psychosocial    No documentation.                  Abuse/Neglect    No documentation.                  Legal    No documentation.                  Substance Abuse    No documentation.                  Patient Forms    No documentation.                     Mary Salvador RN

## 2024-03-08 NOTE — OUTREACH NOTE
Prep Survey      Flowsheet Row Responses   [facility] patient discharged from? [place]   Is LACE score < 7 ? Yes   Eligibility [facility]   Date of Admission 03/06/24   Date of Discharge 03/08/24   Discharge Disposition Home or Self Care   Discharge diagnosis Hyponatremia   Does the patient have one of the following disease processes/diagnoses(primary or secondary)? Other   Does the patient have Home health ordered? No   Is there a DME ordered? No   Prep survey completed? Yes            BEAR MEHTA - Registered Nurse

## 2024-03-11 ENCOUNTER — TRANSITIONAL CARE MANAGEMENT TELEPHONE ENCOUNTER (OUTPATIENT)
Dept: CALL CENTER | Facility: HOSPITAL | Age: 69
End: 2024-03-11
Payer: MEDICARE

## 2024-03-11 NOTE — OUTREACH NOTE
Call Center TCM Note      Flowsheet Row Responses   [facility] patient discharged from? [place]   Does the patient have one of the following disease processes/diagnoses(primary or secondary)? Other   TCM attempt successful? Yes   Call start time 1650   Call end time 1653   Discharge diagnosis Hyponatremia, Flu A, diarrhea   Person spoke with today (if not patient) and relationship Patient   Meds reviewed with patient/caregiver? Yes   Does the patient have all medications ordered at discharge? Yes   Is the patient taking all medications as directed (includes completed medication regime)? Yes   Comments PCP Dr Sewell. Hospital follow up in place for 3/12/24  845am with Beverly EPSTEIN   Does the patient have an appointment with their PCP within 7-14 days of discharge? Yes   Has home health visited the patient within 72 hours of discharge? N/A   Psychosocial issues? No   Did the patient receive a copy of their discharge instructions? Yes   Nursing interventions Reviewed instructions with patient   What is the patient's perception of their health status since discharge? Improving  [Patient reports still has cough.]   Is the patient/caregiver able to teach back signs and symptoms related to disease process for when to call PCP? Yes   Is the patient/caregiver able to teach back signs and symptoms related to disease process for when to call 911? Yes   Is the patient/caregiver able to teach back the hierarchy of who to call/visit for symptoms/problems? PCP, Specialist, Home health nurse, Urgent Care, ED, 911 Yes   If the patient is a current smoker, are they able to teach back resources for cessation? Not a smoker   TCM call completed? Yes   Call end time 1653   Would this patient benefit from a Referral to Amb Social Work? No   Is the patient interested in additional calls from an ambulatory ? No            Sandee Navarro RN    3/11/2024, 16:54 EDT

## 2024-03-12 ENCOUNTER — OFFICE VISIT (OUTPATIENT)
Dept: FAMILY MEDICINE CLINIC | Facility: CLINIC | Age: 69
End: 2024-03-12
Payer: MEDICARE

## 2024-03-12 VITALS
SYSTOLIC BLOOD PRESSURE: 120 MMHG | DIASTOLIC BLOOD PRESSURE: 64 MMHG | HEIGHT: 69 IN | BODY MASS INDEX: 28.14 KG/M2 | WEIGHT: 190 LBS | RESPIRATION RATE: 18 BRPM | OXYGEN SATURATION: 98 % | TEMPERATURE: 98 F | HEART RATE: 80 BPM

## 2024-03-12 DIAGNOSIS — E87.1 HYPONATREMIA: ICD-10-CM

## 2024-03-12 DIAGNOSIS — J10.1 INFLUENZA A: ICD-10-CM

## 2024-03-12 DIAGNOSIS — R05.9 COUGH, UNSPECIFIED TYPE: Primary | ICD-10-CM

## 2024-03-12 NOTE — PROGRESS NOTES
Transitional Care Follow Up Visit  Subjective     Inez Ladd is a 69 y.o. female who presents for a transitional care management visit.    Within 48 business hours after discharge our office contacted her via telephone to coordinate her care and needs.      I reviewed and discussed the details of that call along with the discharge summary, hospital problems, inpatient lab results, inpatient diagnostic studies, and consultation reports with Inez.     Current outpatient and discharge medications have been reconciled for the patient.  Reviewed by: Beverly Benavidez PA-C          3/8/2024     6:24 PM   Date of TCM Phone Call   [facility]   Date of Admission 3/6/2024   Date of Discharge 3/8/2024   Discharge Disposition Home or Self Care     Risk for Readmission (LACE) Score: 5 (3/8/2024  6:00 AM)      History of Present Illness   Course During Hospital Stay:    Inez Ladd is a 69 y.o. female  with past medical history of hypertension and a recent diagnosis of influenza A for which she was started on Tamiflu 2x days prior to admission presented to the ED on 3/6 with complaints of N/V/D as well as generalized weakness. She was found to be significantly hyponatremic with initial sodium of 115.  She was admitted to the ICU and started on IVFs.  Her Na+ was closely monitored and increased appropriately while in the ICU. Her numbers have since been stable and she was transferred to telemetry and to our service. She has been off IVFs and tolerating PO intake well.  She will be discharged home today and will continue her course of Tamiflu (to complete today).        She is feeling better at visit today 3/12/2024.      The following portions of the patient's history were reviewed and updated as appropriate: allergies, current medications, past family history, past medical history, past social history, past surgical history, and problem list.    Review of Systems    Objective   Physical Exam  Vitals  and nursing note reviewed.   Constitutional:       Appearance: Normal appearance.   HENT:      Head: Normocephalic and atraumatic.      Right Ear: Tympanic membrane and ear canal normal.      Left Ear: Tympanic membrane and ear canal normal.      Nose: Rhinorrhea present. No congestion.      Mouth/Throat:      Mouth: Mucous membranes are moist.      Pharynx: Oropharynx is clear. No posterior oropharyngeal erythema.   Cardiovascular:      Rate and Rhythm: Normal rate and regular rhythm.   Pulmonary:      Effort: Pulmonary effort is normal.      Breath sounds: Normal breath sounds. No decreased breath sounds, wheezing, rhonchi or rales.   Musculoskeletal:      Cervical back: Neck supple.      Right lower leg: No edema.      Left lower leg: No edema.   Lymphadenopathy:      Cervical: No cervical adenopathy.   Neurological:      Mental Status: She is alert.         Assessment & Plan   Problems Addressed this Visit       Hyponatremia    Influenza A     Other Visit Diagnoses       Cough, unspecified type    -  Primary    Relevant Orders    POCT SARS-CoV-2 Antigen ALONSO (Completed)    POCT Influenza A/B (Completed)          Diagnoses         Codes Comments    Cough, unspecified type    -  Primary ICD-10-CM: R05.9  ICD-9-CM: 786.2     Influenza A     ICD-10-CM: J10.1  ICD-9-CM: 487.1     Hyponatremia     ICD-10-CM: E87.1  ICD-9-CM: 276.1           Continue current medications and finish them. She is feeling better, continue to rest and push fluids.

## 2024-03-13 ENCOUNTER — TELEPHONE (OUTPATIENT)
Dept: OBSTETRICS AND GYNECOLOGY | Facility: CLINIC | Age: 69
End: 2024-03-13
Payer: MEDICARE

## 2024-03-13 NOTE — TELEPHONE ENCOUNTER
Hub staff attempted to follow warm transfer process and was unsuccessful     Caller: Inez Ladd    Relationship to patient: Self    Best call back number: [phone] CALL ANYTIME, IT IS OKAY TO LVM.    Patient is needing:  DUE TO WORK CONFLICT PATIENT CALLED TO RESCHEDULE GYN FOLLOW UP WITH  ON 03/19/24 FOR PESSARY CHECK. PATIENT WAS LAST SEEN 03/15/21. HUB ATTEMPTED TO RESCHEDULE AS NEW GYN. PATIENT REQUESTING . NEXT AVAILABLE IS AUGUST 26th. MENSTRUAL LIKE BRIGHT RED BLEEDING A COUPLE WEEKS AGO. BLEEDING HAS STOPPED BUT WOULD LIKE TO BE SEEN SOONER THAN AUGUST. PATIENT IS ONLY AVAILABLE ON TUESDAYS.

## 2024-03-13 NOTE — TELEPHONE ENCOUNTER
Called patient back to rs, she was unsure if her PCP is doing an annual pap on 03/18, and did not want to reschedule until she found out, she asked if I could contact her PCP and ask them what they are going to do at her visit I notified patient she will need to contact them and get this information and if she would like to schedule an annual/pap through us to call us back she states she will call them and find out because she does not know, previous appointment notes stated coming in for pessary check, I brought this up to patient and she states she did not know what a pessary is.

## 2024-04-23 ENCOUNTER — OFFICE VISIT (OUTPATIENT)
Dept: OBSTETRICS AND GYNECOLOGY | Facility: CLINIC | Age: 69
End: 2024-04-23
Payer: MEDICARE

## 2024-04-23 VITALS
DIASTOLIC BLOOD PRESSURE: 72 MMHG | WEIGHT: 192 LBS | HEIGHT: 69 IN | BODY MASS INDEX: 28.44 KG/M2 | SYSTOLIC BLOOD PRESSURE: 118 MMHG

## 2024-04-23 DIAGNOSIS — N36.8 URETHRAL PROLAPSE: ICD-10-CM

## 2024-04-23 DIAGNOSIS — N81.4 CYSTOCELE WITH UTERINE PROLAPSE: Primary | ICD-10-CM

## 2024-04-23 DIAGNOSIS — N95.0 PMB (POSTMENOPAUSAL BLEEDING): ICD-10-CM

## 2024-04-23 PROCEDURE — 1159F MED LIST DOCD IN RCRD: CPT | Performed by: OBSTETRICS & GYNECOLOGY

## 2024-04-23 PROCEDURE — 99203 OFFICE O/P NEW LOW 30 MIN: CPT | Performed by: OBSTETRICS & GYNECOLOGY

## 2024-04-23 PROCEDURE — 1160F RVW MEDS BY RX/DR IN RCRD: CPT | Performed by: OBSTETRICS & GYNECOLOGY

## 2024-04-23 PROCEDURE — 3074F SYST BP LT 130 MM HG: CPT | Performed by: OBSTETRICS & GYNECOLOGY

## 2024-04-23 PROCEDURE — 3078F DIAST BP <80 MM HG: CPT | Performed by: OBSTETRICS & GYNECOLOGY

## 2024-04-23 RX ORDER — ESTRADIOL 0.1 MG/G
CREAM VAGINAL
Qty: 1 EACH | Refills: 3 | Status: SHIPPED | OUTPATIENT
Start: 2024-04-23

## 2024-04-23 RX ORDER — ESTRADIOL 0.1 MG/G
2 CREAM VAGINAL DAILY
Status: CANCELLED | OUTPATIENT
Start: 2024-04-23

## 2024-04-23 NOTE — PROGRESS NOTES
"     Gynecologic Note      Subjective   Chief Complaint   Patient presents with    Follow-up     Pessary check     Inez Ladd is a 69 y.o. year old who presents to be seen for follow-up of her pessary. The patient noticed some slight bleeding and thinks she cut herself removing the pessary. She has been out of Estrace cream for several weeks.    Overall she is satisfied with how this pessary is doing.  She is not aware of it being present.  She is removing the pessary herself.  She is able to empty were bladder with difficulty.  There is not significant urinary incontinence.  She does not have trouble with urinary urgency or frequency.  There is not significant vaginal discharge present.  She is using estrogen cream  3 times per week to help decrease her vaginal discharge.  She is not having difficulty with bowel function.  She removes the pessary herself for cleaning.     OTHER THINGS SHE WANTS TO DISCUSS TODAY:  Nothing else      Past Surgical History:   Procedure Laterality Date    CARPAL TUNNEL RELEASE      KNEE SURGERY         The following portions of the patient's history were reviewed and updated as appropriate:current medications and allergies      Review of Systems   All other systems reviewed and are negative.            Objective   /72   Ht 175.3 cm (69\")   Wt 87.1 kg (192 lb)   LMP  (LMP Unknown)   BMI 28.35 kg/m²     Physical Exam             Problem List Items Addressed This Visit       Cystocele with uterine prolapse - Primary    Relevant Medications    estradiol (ESTRACE VAGINAL) 0.1 MG/GM vaginal cream    Urethral prolapse    Relevant Medications    estradiol (ESTRACE VAGINAL) 0.1 MG/GM vaginal cream    PMB (postmenopausal bleeding)           Plan   Although bleeding likely from pessary removal, discussed both urethral prolapse as well as risk if endometrial thickening.  Symptomatic prolapse - well controlled with current pessary.  The importance of keeping all planned follow-up and " taking all medications as prescribed was emphasized.  Return for Annual physical, WITH CIERA.                Grace Tucker MD   April 23, 2024

## 2024-04-30 DIAGNOSIS — I10 ESSENTIAL HYPERTENSION: ICD-10-CM

## 2024-04-30 RX ORDER — LISINOPRIL 5 MG/1
5 TABLET ORAL DAILY
Qty: 90 TABLET | Refills: 2 | Status: SHIPPED | OUTPATIENT
Start: 2024-04-30

## 2024-05-20 DIAGNOSIS — N95.0 PMB (POSTMENOPAUSAL BLEEDING): Primary | ICD-10-CM

## 2024-05-21 ENCOUNTER — OFFICE VISIT (OUTPATIENT)
Dept: OBSTETRICS AND GYNECOLOGY | Facility: CLINIC | Age: 69
End: 2024-05-21
Payer: MEDICARE

## 2024-05-21 VITALS
BODY MASS INDEX: 27.99 KG/M2 | WEIGHT: 189 LBS | HEIGHT: 69 IN | SYSTOLIC BLOOD PRESSURE: 120 MMHG | DIASTOLIC BLOOD PRESSURE: 62 MMHG

## 2024-05-21 DIAGNOSIS — N83.8 OVARIAN MASS, LEFT: ICD-10-CM

## 2024-05-21 DIAGNOSIS — N36.8 URETHRAL PROLAPSE: ICD-10-CM

## 2024-05-21 DIAGNOSIS — N95.0 PMB (POSTMENOPAUSAL BLEEDING): Primary | ICD-10-CM

## 2024-05-21 DIAGNOSIS — N81.4 CYSTOCELE WITH UTERINE PROLAPSE: ICD-10-CM

## 2024-05-21 RX ORDER — DOXYCYCLINE 100 MG/1
100 CAPSULE ORAL 2 TIMES DAILY
Qty: 14 CAPSULE | Refills: 0 | Status: SHIPPED | OUTPATIENT
Start: 2024-05-21 | End: 2024-05-28

## 2024-05-21 NOTE — PROGRESS NOTES
Gynecologic Annual Exam Note        GYN Annual Exam     CC - Here for annual exam.        HPI  Inez Ladd is a 69 y.o. female, , who presents for annual well woman exam as a established patient.  She is postmenopausal.. Denies vaginal bleeding.   There were no changes to her medical or surgical history since her last visit. Marital Status: .  She is not currently sexually active. STD testing recommendations have been explained to the patient and she declines STD testing.    The patient would like to discuss the following complaints today: none    Additional OB/GYN History   On HRT? Yes. Details: Estrace Cream with pessary     Last Pap : 3/15/21. Results: negative. HPV:  not done .   Last Completed Pap Smear            PAP SMEAR (Every 3 Years) Next due on 2024  LIQUID-BASED PAP SMEAR WITH HPV GENOTYPING IF ASCUS ([facility])    2021  Pap IG, HPV-hr    02/15/2021  Pap IG, Rfx HPV ASCU    2014  Done - Normal                  History of abnormal Pap smear: no  Family history of uterine, colon, breast, or ovarian cancer: no  Performs monthly Self-Breast Exam: yes  Last mammogram: 21. Done at [facility]. There is a copy in the chart.    Last Completed Mammogram       This patient has no relevant Health Maintenance data.          Last colonoscopy: has had a colonoscopy 3 months  ago benign polyps RTO 5yrs    Last Completed Colonoscopy            COLORECTAL CANCER SCREENING (COLONOSCOPY - Every 5 Years) Next due on 2024  SCANNED - COLONOSCOPY    2024  SCANNED - COLONOSCOPY    2021  COLOGUARD (Done - in chart)    2021  COLOGUARD (Done - in chart)    2021  Cologuard - Stool, Per Rectum    Only the first 5 history entries have been loaded, but more history exists.                    Several years ago  Exercises Regularly: yes  Feelings of Anxiety or Depression: no      Tobacco Usage?: No       Current Outpatient Medications:      acetaminophen (TYLENOL) 500 MG tablet, Take 1 tablet by mouth Every 6 (Six) Hours As Needed for Mild Pain., Disp: , Rfl:     amLODIPine (NORVASC) 5 MG tablet, Take 1 tablet by mouth Daily., Disp: 90 tablet, Rfl: 2    doxycycline (MONODOX) 100 MG capsule, Take 1 capsule by mouth 2 (Two) Times a Day for 7 days., Disp: 14 capsule, Rfl: 0    estradiol (ESTRACE VAGINAL) 0.1 MG/GM vaginal cream, Insert 1 gm intravaginally weekly, Disp: 1 each, Rfl: 3    guaiFENesin (MUCINEX) 600 MG 12 hr tablet, Take 1 tablet by mouth Every 12 (Twelve) Hours., Disp: 20 tablet, Rfl: 0    lisinopril (PRINIVIL,ZESTRIL) 5 MG tablet, Take 1 tablet by mouth Daily., Disp: 90 tablet, Rfl: 2    Patient denies the need for medication refills today.    OB History          2    Para   2    Term   2            AB        Living   2         SAB        IAB        Ectopic        Molar        Multiple        Live Births   2                Past Medical History:   Diagnosis Date    Acute cystitis     Acute pharyngitis     Body aches     Cancer     skin     Cellulitis     Dysuria     H/O bladder infections     2 SINCE AUGUST, urinary tract infection    History of prolapse of bladder      Bladder disorder    Hypertension     Impacted cerumen     Impacted cerumen, unspecified ear     Infected insect bite of forearm     Menopause     Prophylactic immunotherapy     Separation of AC joint     Right Shoulder        Past Surgical History:   Procedure Laterality Date    CARPAL TUNNEL RELEASE      KNEE SURGERY         Health Maintenance   Topic Date Due    DXA SCAN  Never done    TDAP/TD VACCINES (1 - Tdap) Never done    RSV Vaccine - Adults (1 - 1-dose 60+ series) Never done    Pneumococcal Vaccine 65+ (1 of 1 - PCV) Never done    MAMMOGRAM  2023    COVID-19 Vaccine (3 - -24 season) 2023    ZOSTER VACCINE (2 of 2) 10/28/2023    ANNUAL WELLNESS VISIT  03/15/2024    LIPID PANEL  03/15/2024    BMI FOLLOWUP  03/15/2024    INFLUENZA  "VACCINE  08/01/2024    PAP SMEAR  05/21/2027    COLORECTAL CANCER SCREENING  02/13/2029    HEPATITIS C SCREENING  Completed       The additional following portions of the patient's history were reviewed and updated as appropriate: allergies, current medications, past family history, past medical history, past social history, past surgical history, and problem list.    Review of Systems   All other systems reviewed and are negative.      I have reviewed and agree with the HPI, ROS, and historical information as entered above. Grace Tucker MD      Objective   /62   Ht 175.3 cm (69\")   Wt 85.7 kg (189 lb)   LMP  (LMP Unknown)   BMI 27.91 kg/m²     Physical Exam    General:  well developed; well nourished  no acute distress  Skin:  No suspicious lesions seen  Thyroid: normal to inspection and palpation  Breasts:  Examined in supine position  Symmetric without masses or skin dimpling  Nipples normal without inversion, lesions or discharge  There are no palpable axillary nodes  CVS: RRR, no M/R/G, distal pulses wnl  Resp: CTAB, No W/R/R  Abdomen: soft, non-tender; no masses  no umbilical or inguinal hernias are present  no hepato-splenomegaly  Pelvis: Clinical staff was present for exam  External genitalia:  normal appearance of the external genitalia including Bartholin's and [place]'s glands.  :  urethral meatus normal; urethral prolapse present  Vaginal:  atrophic mucosal changes are present; No areas of abrasion noted.  Yellow, purulent-appearing fluid noted.  Cervix:  normal appearance.  Uterus:  normal size, shape and consistency.  Adnexa:  normal bimanual exam of the adnexa.  Prolapse as previously noted     Ext: 2+ pulses, no edema     Assessment and Plan    Problem List Items Addressed This Visit       Cystocele with uterine prolapse    Relevant Medications    estradiol (ESTRACE VAGINAL) 0.1 MG/GM vaginal cream    Urethral prolapse    Relevant Medications    estradiol (ESTRACE VAGINAL) 0.1 MG/GM " vaginal cream    PMB (postmenopausal bleeding) - Primary    Relevant Orders    LIQUID-BASED PAP SMEAR WITH HPV GENOTYPING IF ASCUS ([facility]) (Completed)    Ovarian mass, left    Overview     OVA1 5/21/24  MI ~4.         Relevant Orders    OVA1 ([facility])       GYN annual well woman exam.   Reviewed monthly self breast exams.  Instructed to call with lumps, pain, or breast discharge.  Yearly mammograms ordered.  Reviewed exercise as a preventative health measures.   Doxycycline empirically for vaginitis  As cyst is largely asymptomatic, will do OVA 1.  If likely benign, follow on sono  Return in about 3 months (around 8/21/2024) for WITH SONO.         Grace Tucker MD  05/21/2024

## 2024-05-23 LAB — REF LAB TEST METHOD: NORMAL

## 2024-05-24 ENCOUNTER — TELEPHONE (OUTPATIENT)
Dept: OBSTETRICS AND GYNECOLOGY | Facility: CLINIC | Age: 69
End: 2024-05-24
Payer: MEDICARE

## 2024-05-28 ENCOUNTER — OFFICE VISIT (OUTPATIENT)
Dept: FAMILY MEDICINE CLINIC | Facility: CLINIC | Age: 69
End: 2024-05-28
Payer: MEDICARE

## 2024-05-28 ENCOUNTER — LAB (OUTPATIENT)
Dept: LAB | Facility: HOSPITAL | Age: 69
End: 2024-05-28
Payer: MEDICARE

## 2024-05-28 VITALS
DIASTOLIC BLOOD PRESSURE: 58 MMHG | TEMPERATURE: 98.2 F | HEART RATE: 95 BPM | WEIGHT: 188 LBS | SYSTOLIC BLOOD PRESSURE: 118 MMHG | BODY MASS INDEX: 27.85 KG/M2 | OXYGEN SATURATION: 98 % | HEIGHT: 69 IN

## 2024-05-28 DIAGNOSIS — Z12.31 ENCOUNTER FOR SCREENING MAMMOGRAM FOR MALIGNANT NEOPLASM OF BREAST: ICD-10-CM

## 2024-05-28 DIAGNOSIS — E78.41 ELEVATED LIPOPROTEIN(A): ICD-10-CM

## 2024-05-28 DIAGNOSIS — E87.6 LOW BLOOD POTASSIUM: ICD-10-CM

## 2024-05-28 DIAGNOSIS — G89.29 CHRONIC PAIN OF RIGHT KNEE: ICD-10-CM

## 2024-05-28 DIAGNOSIS — J06.9 UPPER RESPIRATORY TRACT INFECTION, UNSPECIFIED TYPE: ICD-10-CM

## 2024-05-28 DIAGNOSIS — E87.1 LOW SODIUM LEVELS: ICD-10-CM

## 2024-05-28 DIAGNOSIS — Z23 IMMUNIZATION DUE: ICD-10-CM

## 2024-05-28 DIAGNOSIS — M25.561 CHRONIC PAIN OF RIGHT KNEE: ICD-10-CM

## 2024-05-28 DIAGNOSIS — Z00.00 MEDICARE ANNUAL WELLNESS VISIT, SUBSEQUENT: Primary | ICD-10-CM

## 2024-05-28 PROCEDURE — 1159F MED LIST DOCD IN RCRD: CPT | Performed by: FAMILY MEDICINE

## 2024-05-28 PROCEDURE — 1170F FXNL STATUS ASSESSED: CPT | Performed by: FAMILY MEDICINE

## 2024-05-28 PROCEDURE — 3078F DIAST BP <80 MM HG: CPT | Performed by: FAMILY MEDICINE

## 2024-05-28 PROCEDURE — G0439 PPPS, SUBSEQ VISIT: HCPCS | Performed by: FAMILY MEDICINE

## 2024-05-28 PROCEDURE — 1160F RVW MEDS BY RX/DR IN RCRD: CPT | Performed by: FAMILY MEDICINE

## 2024-05-28 PROCEDURE — 87804 INFLUENZA ASSAY W/OPTIC: CPT | Performed by: FAMILY MEDICINE

## 2024-05-28 PROCEDURE — 80053 COMPREHEN METABOLIC PANEL: CPT

## 2024-05-28 PROCEDURE — 87426 SARSCOV CORONAVIRUS AG IA: CPT | Performed by: FAMILY MEDICINE

## 2024-05-28 PROCEDURE — 80061 LIPID PANEL: CPT

## 2024-05-28 PROCEDURE — 3074F SYST BP LT 130 MM HG: CPT | Performed by: FAMILY MEDICINE

## 2024-05-28 PROCEDURE — 99214 OFFICE O/P EST MOD 30 MIN: CPT | Performed by: FAMILY MEDICINE

## 2024-05-28 PROCEDURE — 1126F AMNT PAIN NOTED NONE PRSNT: CPT | Performed by: FAMILY MEDICINE

## 2024-05-28 RX ORDER — AMOXICILLIN AND CLAVULANATE POTASSIUM 875; 125 MG/1; MG/1
1 TABLET, FILM COATED ORAL 2 TIMES DAILY
COMMUNITY

## 2024-05-28 NOTE — PROGRESS NOTES
The ABCs of the Annual Wellness Visit  Subsequent Medicare Wellness Visit    Subjective    Inez Ladd is a 69 y.o. female who presents for a Subsequent Medicare Wellness Visit.    The following portions of the patient's history were reviewed and   updated as appropriate: allergies, current medications, past family history, past medical history, past social history, past surgical history, and problem list.    Compared to one year ago, the patient feels her physical   health is the same.    Compared to one year ago, the patient feels her mental   health is the same.    Recent Hospitalizations:  This patient has had a [facility] admission record on file within the last 365 days.    Current Medical Providers:  Patient Care Team:  Curt Sewell DO as PCP - General (Family Medicine)    Outpatient Medications Prior to Visit   Medication Sig Dispense Refill    acetaminophen (TYLENOL) 500 MG tablet Take 1 tablet by mouth Every 6 (Six) Hours As Needed for Mild Pain.      amLODIPine (NORVASC) 5 MG tablet Take 1 tablet by mouth Daily. 90 tablet 2    amoxicillin-clavulanate (AUGMENTIN) 875-125 MG per tablet Take 1 tablet by mouth 2 (Two) Times a Day.      estradiol (ESTRACE VAGINAL) 0.1 MG/GM vaginal cream Insert 1 gm intravaginally weekly 1 each 3    lisinopril (PRINIVIL,ZESTRIL) 5 MG tablet Take 1 tablet by mouth Daily. 90 tablet 2    meloxicam (MOBIC) 15 MG tablet Take 1 tablet by mouth Daily.      doxycycline (MONODOX) 100 MG capsule Take 1 capsule by mouth 2 (Two) Times a Day for 7 days. (Patient not taking: Reported on 5/28/2024) 14 capsule 0    guaiFENesin (MUCINEX) 600 MG 12 hr tablet Take 1 tablet by mouth Every 12 (Twelve) Hours. (Patient not taking: Reported on 5/28/2024) 20 tablet 0    levocetirizine (XYZAL) 5 MG tablet Take 1 tablet by mouth Every Evening. (Patient not taking: Reported on 5/28/2024) 30 tablet 0    montelukast (SINGULAIR) 10 MG tablet Take 1 tablet by mouth Every Night. (Patient not  "taking: Reported on 5/28/2024) 30 tablet 0     No facility-administered medications prior to visit.       No opioid medication identified on active medication list. I have reviewed chart for other potential  high risk medication/s and harmful drug interactions in the elderly.        Aspirin is not on active medication list.  Aspirin use is not indicated based on review of current medical condition/s. Risk of harm outweighs potential benefits.  .    Patient Active Problem List   Diagnosis    Calculus of parotid gland duct    Calculus of parotid gland duct    Menopause present    Essential hypertension    Encounter for annual routine gynecological examination    Urge incontinence    Cystocele with uterine prolapse    Fitting and adjustment of pessary    Unspecified abnormal cytological findings in specimens from cervix uteri     Impacted cerumen of left ear    Acute pain of right knee    Osteoarthritis of left knee    Acute non-recurrent maxillary sinusitis    Dehydration    Hyponatremia    Influenza A    Diarrhea    Urethral prolapse    PMB (postmenopausal bleeding)    Ovarian mass, left     Advance Care Planning   Advance Care Planning     Advance Directive is not on file.  ACP discussion was held with the patient during this visit. Patient does not have an advance directive, information provided.     Objective    Vitals:    05/28/24 1344   BP: 118/58   Pulse: 95   Temp: 98.2 °F (36.8 °C)   SpO2: 98%   Weight: 85.3 kg (188 lb)   Height: 175.3 cm (69\")     Estimated body mass index is 27.76 kg/m² as calculated from the following:    Height as of this encounter: 175.3 cm (69\").    Weight as of this encounter: 85.3 kg (188 lb).           Does the patient have evidence of cognitive impairment? No          HEALTH RISK ASSESSMENT    Smoking Status:  Social History     Tobacco Use   Smoking Status Former    Current packs/day: 0.00    Average packs/day: 0.3 packs/day for 14.0 years (3.5 ttl pk-yrs)    Types: Cigarettes    " Start date: 1984    Quit date: 1998    Years since quittin.4    Passive exposure: Past   Smokeless Tobacco Never     Alcohol Consumption:  Social History     Substance and Sexual Activity   Alcohol Use Yes    Comment: Occasional alcohol use      Fall Risk Screen:    DARRYL Fall Risk Assessment was completed, and patient is at MODERATE risk for falls. Assessment completed on:2024    Depression Screenin/28/2024     1:39 PM   PHQ-2/PHQ-9 Depression Screening   Little Interest or Pleasure in Doing Things 0-->not at all   Feeling Down, Depressed or Hopeless 0-->not at all   PHQ-9: Brief Depression Severity Measure Score 0       Health Habits and Functional and Cognitive Screenin/28/2024     1:43 PM   Functional & Cognitive Status   Do you have difficulty preparing food and eating? No   Do you have difficulty bathing yourself, getting dressed or grooming yourself? No   Do you have difficulty using the toilet? No   Do you have difficulty moving around from place to place? No   Do you have trouble with steps or getting out of a bed or a chair? No   Current Diet Well Balanced Diet   Dental Exam Up to date   Eye Exam Up to date   Exercise (times per week) 5 times per week   Current Exercises Include Walking;Other   Do you need help using the phone?  No   Are you deaf or do you have serious difficulty hearing?  Yes   Do you need help to go to places out of walking distance? No   Do you need help shopping? No   Do you need help preparing meals?  No   Do you need help with housework?  No   Do you need help with laundry? No   Do you need help taking your medications? No   Do you need help managing money? No   Do you ever drive or ride in a car without wearing a seat belt? No   Have you felt unusual stress, anger or loneliness in the last month? No   Who do you live with? Sibling   If you need help, do you have trouble finding someone available to you? No   Have you been bothered in the last  four weeks by sexual problems? No   Do you have difficulty concentrating, remembering or making decisions? No       Age-appropriate Screening Schedule:  Refer to the list below for future screening recommendations based on patient's age, sex and/or medical conditions. Orders for these recommended tests are listed in the plan section. The patient has been provided with a written plan.    Health Maintenance   Topic Date Due    DXA SCAN  Never done    TDAP/TD VACCINES (1 - Tdap) Never done    Pneumococcal Vaccine 65+ (1 of 1 - PCV) Never done    MAMMOGRAM  04/22/2023    LIPID PANEL  03/15/2024    COVID-19 Vaccine (3 - 2023-24 season) 05/28/2025 (Originally 9/1/2023)    RSV Vaccine - Adults (1 - 1-dose 60+ series) 05/28/2025 (Originally 2/18/2015)    ZOSTER VACCINE (2 of 2) 05/28/2025 (Originally 10/28/2023)    INFLUENZA VACCINE  08/01/2024    ANNUAL WELLNESS VISIT  05/28/2025    BMI FOLLOWUP  05/28/2025    PAP SMEAR  05/21/2027    COLORECTAL CANCER SCREENING  02/13/2029    HEPATITIS C SCREENING  Completed                  CMS Preventative Services Quick Reference  Risk Factors Identified During Encounter  Immunizations Discussed/Encouraged: Tdap and Prevnar 20 (Pneumococcal 20-valent conjugate)  The above risks/problems have been discussed with the patient.  Pertinent information has been shared with the patient in the After Visit Summary.  An After Visit Summary and PPPS were made available to the patient.    Follow Up:   Next Medicare Wellness visit to be scheduled in 1 year.       Additional E&M Note during same encounter follows:  Patient has multiple medical problems which are significant and separately identifiable that require additional work above and beyond the Medicare Wellness Visit.      Chief Complaint  Medicare Wellness-subsequent (Needs swabbed. Got sick Saturday night, fever, nausea, chills. Went to urgent care, tested negative for flu and covid. Fatigue.)    Subjective        URI - been sick for 4  "days. Fever at home up to 101. Went to urgent care.  Overall no improvement.  She is on antibiotic from urgent care.  She did not start the other medications as prescribed because she does not think it is a fever.  She does have a fever so less likely correct    Knee pain - has been on meloxicam, tumeric.  Knee pain is persistent but walking seems to help.  She is on conjoint and, turmeric, and her joint supplement has several other herbs involved.    Memory issue - she is on alpha lipoic acid     Go lo diet-wanting to make sure that this is okay.  She is on turmeric and also a joint supplement that has conjoint and along with several other herbs.          Inez Ladd is also being seen today for URI, knee pain.    Review of Systems   Musculoskeletal:  Positive for arthralgias.       Objective   Vital Signs:  /58   Pulse 95   Temp 98.2 °F (36.8 °C)   Ht 175.3 cm (69\")   Wt 85.3 kg (188 lb)   SpO2 98%   BMI 27.76 kg/m²     Physical Exam  Vitals reviewed.   Cardiovascular:      Rate and Rhythm: Normal rate and regular rhythm.   Pulmonary:      Effort: Pulmonary effort is normal. No respiratory distress.      Breath sounds: Normal breath sounds.   Abdominal:      General: Abdomen is flat.   Neurological:      Mental Status: She is alert. Mental status is at baseline.                     Assessment and Plan   Diagnoses and all orders for this visit:    1. Medicare annual wellness visit, subsequent (Primary)    2. Upper respiratory tract infection, unspecified type  -     POCT SARS-CoV-2 Antigen  -     POC Influenza A / B    3. Encounter for screening mammogram for malignant neoplasm of breast  -     Mammo screening digital tomosynthesis bilateral w CAD; Future    4. Immunization due    5. Low sodium levels  -     Comprehensive Metabolic Panel; Future    6. Low blood potassium  -     Comprehensive Metabolic Panel; Future    7. Elevated lipoprotein(a)  -     Lipid Panel; Future    8. Chronic pain of right " knee  -     Diclofenac Sodium (VOLTAREN) 1 % gel gel; Apply 4 g topically to the appropriate area as directed 4 (Four) Times a Day As Needed (knee pain).  Dispense: 100 g; Refill: 1    URI noted today.  COVID and flu still negative.  I believe patient likely has just a viral infection that will resolve over the next 1 to 2 weeks.  She should start to get better over the next several days.  I think the patient started antibiotic too soon and probably will not benefit from it fully.  Patient should finish the prescription since she started it.  If patient's symptoms persist 3 to 5 days after finishing antibiotic or if they recur or get worse-then I would recommend treating with doxycycline for 7 to 10 days.    Mammogram ordered.    Decrease sodium levels and potassium levels noted on hospital stay-repeat blood work today.  Hyperlipidemia noted and will be checked today.    Chronic knee pain addressed-I think is safe for the patient continue meloxicam low-dose along with diclofenac gel as needed.         Follow Up   Return in about 1 year (around 5/28/2025) for Labs today, Medicare Wellness.  Patient was given instructions and counseling regarding her condition or for health maintenance advice. Please see specific information pulled into the AVS if appropriate.

## 2024-05-29 LAB
ALBUMIN SERPL-MCNC: 4.1 G/DL (ref 3.5–5.2)
ALBUMIN/GLOB SERPL: 1.6 G/DL
ALP SERPL-CCNC: 70 U/L (ref 39–117)
ALT SERPL W P-5'-P-CCNC: 10 U/L (ref 1–33)
ANION GAP SERPL CALCULATED.3IONS-SCNC: 12.1 MMOL/L (ref 5–15)
AST SERPL-CCNC: 13 U/L (ref 1–32)
BILIRUB SERPL-MCNC: 0.3 MG/DL (ref 0–1.2)
BUN SERPL-MCNC: 9 MG/DL (ref 8–23)
BUN/CREAT SERPL: 12.9 (ref 7–25)
CALCIUM SPEC-SCNC: 9.2 MG/DL (ref 8.6–10.5)
CHLORIDE SERPL-SCNC: 101 MMOL/L (ref 98–107)
CHOLEST SERPL-MCNC: 138 MG/DL (ref 0–200)
CO2 SERPL-SCNC: 22.9 MMOL/L (ref 22–29)
CREAT SERPL-MCNC: 0.7 MG/DL (ref 0.57–1)
EGFRCR SERPLBLD CKD-EPI 2021: 93.8 ML/MIN/1.73
GLOBULIN UR ELPH-MCNC: 2.6 GM/DL
GLUCOSE SERPL-MCNC: 94 MG/DL (ref 65–99)
HDLC SERPL-MCNC: 50 MG/DL (ref 40–60)
LDLC SERPL CALC-MCNC: 66 MG/DL (ref 0–100)
LDLC/HDLC SERPL: 1.28 {RATIO}
POTASSIUM SERPL-SCNC: 3.9 MMOL/L (ref 3.5–5.2)
PROT SERPL-MCNC: 6.7 G/DL (ref 6–8.5)
SODIUM SERPL-SCNC: 136 MMOL/L (ref 136–145)
TRIGL SERPL-MCNC: 121 MG/DL (ref 0–150)
VLDLC SERPL-MCNC: 22 MG/DL (ref 5–40)

## 2024-06-05 DIAGNOSIS — J06.9 UPPER RESPIRATORY TRACT INFECTION, UNSPECIFIED TYPE: Primary | ICD-10-CM

## 2024-06-05 RX ORDER — DOXYCYCLINE 100 MG/1
100 CAPSULE ORAL 2 TIMES DAILY
Qty: 14 CAPSULE | Refills: 0 | OUTPATIENT
Start: 2024-06-05 | End: 2024-06-06

## 2024-06-06 ENCOUNTER — HOSPITAL ENCOUNTER (EMERGENCY)
Facility: HOSPITAL | Age: 69
Discharge: HOME | End: 2024-06-06
Attending: EMERGENCY MEDICINE
Payer: MEDICARE

## 2024-06-06 VITALS
OXYGEN SATURATION: 94 % | SYSTOLIC BLOOD PRESSURE: 113 MMHG | BODY MASS INDEX: 27.85 KG/M2 | RESPIRATION RATE: 20 BRPM | WEIGHT: 188 LBS | DIASTOLIC BLOOD PRESSURE: 61 MMHG | HEART RATE: 95 BPM | HEIGHT: 69 IN | TEMPERATURE: 100.9 F

## 2024-06-06 DIAGNOSIS — N39.0 BACTERIAL UTI: Primary | ICD-10-CM

## 2024-06-06 DIAGNOSIS — A49.9 BACTERIAL UTI: Primary | ICD-10-CM

## 2024-06-06 LAB
ALBUMIN SERPL-MCNC: 4 G/DL (ref 3.5–5.2)
ALBUMIN/GLOB SERPL: 1.7 G/DL
ALP SERPL-CCNC: 70 U/L (ref 39–117)
ALT SERPL W P-5'-P-CCNC: 21 U/L (ref 1–33)
ANION GAP SERPL CALCULATED.3IONS-SCNC: 10 MMOL/L (ref 5–15)
AST SERPL-CCNC: 24 U/L (ref 1–32)
BACTERIA UR QL AUTO: ABNORMAL /HPF
BASOPHILS # BLD AUTO: 0.01 10*3/MM3 (ref 0–0.2)
BASOPHILS NFR BLD AUTO: 0.1 % (ref 0–1.5)
BILIRUB SERPL-MCNC: 0.6 MG/DL (ref 0–1.2)
BILIRUB UR QL STRIP: NEGATIVE
BUN SERPL-MCNC: 22 MG/DL (ref 8–23)
BUN/CREAT SERPL: 30.1 (ref 7–25)
CALCIUM SPEC-SCNC: 8.8 MG/DL (ref 8.6–10.5)
CHLORIDE SERPL-SCNC: 101 MMOL/L (ref 98–107)
CLARITY UR: CLEAR
CO2 SERPL-SCNC: 24 MMOL/L (ref 22–29)
COLOR UR: YELLOW
CREAT SERPL-MCNC: 0.73 MG/DL (ref 0.57–1)
D-LACTATE SERPL-SCNC: 1.3 MMOL/L (ref 0.5–2)
DEPRECATED RDW RBC AUTO: 40.4 FL (ref 37–54)
EGFRCR SERPLBLD CKD-EPI 2021: 89.2 ML/MIN/1.73
EOSINOPHIL # BLD AUTO: 0.08 10*3/MM3 (ref 0–0.4)
EOSINOPHIL NFR BLD AUTO: 0.7 % (ref 0.3–6.2)
ERYTHROCYTE [DISTWIDTH] IN BLOOD BY AUTOMATED COUNT: 12.6 % (ref 12.3–15.4)
FLUAV SUBTYP SPEC NAA+PROBE: NOT DETECTED
FLUBV RNA ISLT QL NAA+PROBE: NOT DETECTED
GLOBULIN UR ELPH-MCNC: 2.3 GM/DL
GLUCOSE SERPL-MCNC: 145 MG/DL (ref 65–99)
GLUCOSE UR STRIP-MCNC: NEGATIVE MG/DL
HCT VFR BLD AUTO: 38.7 % (ref 34–46.6)
HGB BLD-MCNC: 12.7 G/DL (ref 12–15.9)
HGB UR QL STRIP.AUTO: NEGATIVE
HYALINE CASTS UR QL AUTO: ABNORMAL /LPF
IMM GRANULOCYTES # BLD AUTO: 0.07 10*3/MM3 (ref 0–0.05)
IMM GRANULOCYTES NFR BLD AUTO: 0.6 % (ref 0–0.5)
KETONES UR QL STRIP: ABNORMAL
LEUKOCYTE ESTERASE UR QL STRIP.AUTO: ABNORMAL
LYMPHOCYTES # BLD AUTO: 0.11 10*3/MM3 (ref 0.7–3.1)
LYMPHOCYTES NFR BLD AUTO: 1 % (ref 19.6–45.3)
MCH RBC QN AUTO: 28.7 PG (ref 26.6–33)
MCHC RBC AUTO-ENTMCNC: 32.8 G/DL (ref 31.5–35.7)
MCV RBC AUTO: 87.4 FL (ref 79–97)
MONOCYTES # BLD AUTO: 0.46 10*3/MM3 (ref 0.1–0.9)
MONOCYTES NFR BLD AUTO: 4.2 % (ref 5–12)
MUCOUS THREADS URNS QL MICRO: ABNORMAL /HPF
NEUTROPHILS NFR BLD AUTO: 10.27 10*3/MM3 (ref 1.7–7)
NEUTROPHILS NFR BLD AUTO: 93.4 % (ref 42.7–76)
NITRITE UR QL STRIP: NEGATIVE
NRBC BLD AUTO-RTO: 0 /100 WBC (ref 0–0.2)
PH UR STRIP.AUTO: 6.5 [PH] (ref 5–8)
PLATELET # BLD AUTO: 273 10*3/MM3 (ref 140–450)
PMV BLD AUTO: 9.5 FL (ref 6–12)
POTASSIUM SERPL-SCNC: 3.7 MMOL/L (ref 3.5–5.2)
PROCALCITONIN SERPL-MCNC: 0.93 NG/ML (ref 0–0.25)
PROT SERPL-MCNC: 6.3 G/DL (ref 6–8.5)
PROT UR QL STRIP: ABNORMAL
RBC # BLD AUTO: 4.43 10*6/MM3 (ref 3.77–5.28)
RBC # UR STRIP: ABNORMAL /HPF
REF LAB TEST METHOD: ABNORMAL
SARS-COV-2 RNA RESP QL NAA+PROBE: NOT DETECTED
SODIUM SERPL-SCNC: 135 MMOL/L (ref 136–145)
SP GR UR STRIP: 1.02 (ref 1–1.03)
SQUAMOUS #/AREA URNS HPF: ABNORMAL /HPF
TRANS CELLS #/AREA URNS HPF: ABNORMAL /HPF
UROBILINOGEN UR QL STRIP: ABNORMAL
WBC # UR STRIP: ABNORMAL /HPF
WBC NRBC COR # BLD AUTO: 11 10*3/MM3 (ref 3.4–10.8)

## 2024-06-06 PROCEDURE — 87086 URINE CULTURE/COLONY COUNT: CPT | Performed by: EMERGENCY MEDICINE

## 2024-06-06 PROCEDURE — 25010000002 CEFTRIAXONE PER 250 MG: Performed by: EMERGENCY MEDICINE

## 2024-06-06 PROCEDURE — 25810000003 SODIUM CHLORIDE 0.9 % SOLUTION: Performed by: EMERGENCY MEDICINE

## 2024-06-06 PROCEDURE — 87040 BLOOD CULTURE FOR BACTERIA: CPT | Performed by: EMERGENCY MEDICINE

## 2024-06-06 PROCEDURE — 96365 THER/PROPH/DIAG IV INF INIT: CPT

## 2024-06-06 PROCEDURE — 87186 SC STD MICRODIL/AGAR DIL: CPT | Performed by: EMERGENCY MEDICINE

## 2024-06-06 PROCEDURE — 25010000002 ONDANSETRON PER 1 MG: Performed by: EMERGENCY MEDICINE

## 2024-06-06 PROCEDURE — 87636 SARSCOV2 & INF A&B AMP PRB: CPT | Performed by: EMERGENCY MEDICINE

## 2024-06-06 PROCEDURE — 99283 EMERGENCY DEPT VISIT LOW MDM: CPT

## 2024-06-06 PROCEDURE — 87088 URINE BACTERIA CULTURE: CPT | Performed by: EMERGENCY MEDICINE

## 2024-06-06 PROCEDURE — 83605 ASSAY OF LACTIC ACID: CPT | Performed by: EMERGENCY MEDICINE

## 2024-06-06 PROCEDURE — 81001 URINALYSIS AUTO W/SCOPE: CPT | Performed by: EMERGENCY MEDICINE

## 2024-06-06 PROCEDURE — 84145 PROCALCITONIN (PCT): CPT | Performed by: EMERGENCY MEDICINE

## 2024-06-06 PROCEDURE — 36415 COLL VENOUS BLD VENIPUNCTURE: CPT

## 2024-06-06 PROCEDURE — 80053 COMPREHEN METABOLIC PANEL: CPT | Performed by: EMERGENCY MEDICINE

## 2024-06-06 PROCEDURE — 96375 TX/PRO/DX INJ NEW DRUG ADDON: CPT

## 2024-06-06 PROCEDURE — 25010000002 KETOROLAC TROMETHAMINE PER 15 MG: Performed by: EMERGENCY MEDICINE

## 2024-06-06 PROCEDURE — 85025 COMPLETE CBC W/AUTO DIFF WBC: CPT | Performed by: EMERGENCY MEDICINE

## 2024-06-06 RX ORDER — ACETAMINOPHEN 325 MG/1
650 TABLET ORAL ONCE
Status: COMPLETED | OUTPATIENT
Start: 2024-06-06 | End: 2024-06-06

## 2024-06-06 RX ORDER — ONDANSETRON 4 MG/1
4 TABLET, ORALLY DISINTEGRATING ORAL EVERY 8 HOURS PRN
Qty: 9 TABLET | Refills: 0 | Status: SHIPPED | OUTPATIENT
Start: 2024-06-06 | End: 2024-06-09

## 2024-06-06 RX ORDER — ONDANSETRON 2 MG/ML
4 INJECTION INTRAMUSCULAR; INTRAVENOUS ONCE
Status: COMPLETED | OUTPATIENT
Start: 2024-06-06 | End: 2024-06-06

## 2024-06-06 RX ORDER — SODIUM CHLORIDE 0.9 % (FLUSH) 0.9 %
10 SYRINGE (ML) INJECTION AS NEEDED
Status: DISCONTINUED | OUTPATIENT
Start: 2024-06-06 | End: 2024-06-06 | Stop reason: HOSPADM

## 2024-06-06 RX ORDER — KETOROLAC TROMETHAMINE 15 MG/ML
15 INJECTION, SOLUTION INTRAMUSCULAR; INTRAVENOUS ONCE
Status: COMPLETED | OUTPATIENT
Start: 2024-06-06 | End: 2024-06-06

## 2024-06-06 RX ORDER — CEFUROXIME AXETIL 500 MG/1
500 TABLET ORAL 2 TIMES DAILY
Qty: 20 TABLET | Refills: 0 | Status: SHIPPED | OUTPATIENT
Start: 2024-06-06

## 2024-06-06 RX ADMIN — ONDANSETRON 4 MG: 2 INJECTION INTRAMUSCULAR; INTRAVENOUS at 11:00

## 2024-06-06 RX ADMIN — KETOROLAC TROMETHAMINE 15 MG: 15 INJECTION, SOLUTION INTRAMUSCULAR; INTRAVENOUS at 11:00

## 2024-06-06 RX ADMIN — SODIUM CHLORIDE 1000 MG: 900 INJECTION INTRAVENOUS at 11:20

## 2024-06-06 RX ADMIN — SODIUM CHLORIDE 1000 ML: 9 INJECTION, SOLUTION INTRAVENOUS at 11:00

## 2024-06-06 RX ADMIN — ACETAMINOPHEN 650 MG: 325 TABLET ORAL at 11:20

## 2024-06-06 NOTE — DISCHARGE INSTRUCTIONS
Come back if you feel worse or have any other concerns.  If your blood cultures show infection in your bloodstream we will call you immediately and have you come back.  Otherwise start the antibiotics I have prescribed tomorrow.  You do not need any more of the other antibiotics that you were already on.

## 2024-06-06 NOTE — ED PROVIDER NOTES
Subjective   History of Present Illness  Mrs Ladd presents with fever and vomiting.  She tells me this is the third time it has happened over the last few months.  She tells me she was admitted to the ICU for the same thing in March of this year and found to have the flu.  She felt better for a few months but a couple of weeks ago began having similar symptoms again.  She took Augmentin and briefly felt better.  She woke up with shaking chills and vomiting a few days ago and was placed on doxycycline.  She reports body aches.  She denies runny nose, sore throat, or cough.  She denies diarrhea.      Review of Systems    Past Medical History:   Diagnosis Date    Acute cystitis     Acute pharyngitis     Body aches     Cancer     skin     Cellulitis     Dysuria     H/O bladder infections     2 SINCE AUGUST, urinary tract infection    History of prolapse of bladder      Bladder disorder    Hypertension     Impacted cerumen     Impacted cerumen, unspecified ear     Infected insect bite of forearm     Menopause     Prophylactic immunotherapy     Separation of AC joint     Right Shoulder       No Known Allergies    Past Surgical History:   Procedure Laterality Date    CARPAL TUNNEL RELEASE      KNEE SURGERY         Family History   Problem Relation Age of Onset    Coronary artery disease Other     Coronary artery disease Mother     Hypertension Mother     Dementia Mother     No Known Problems Father     Heart disease Maternal Grandmother     Breast cancer Neg Hx     Ovarian cancer Neg Hx        Social History     Socioeconomic History    Marital status:     Number of children: 2   Tobacco Use    Smoking status: Former     Current packs/day: 0.00     Average packs/day: 0.3 packs/day for 14.0 years (3.5 ttl pk-yrs)     Types: Cigarettes     Start date: 1984     Quit date: 1998     Years since quittin.4     Passive exposure: Past    Smokeless tobacco: Never   Vaping Use    Vaping status: Never Used    Substance and Sexual Activity    Alcohol use: Yes     Comment: Occasional alcohol use     Drug use: No    Sexual activity: Not Currently     Birth control/protection: None           Objective   Physical Exam  Vitals and nursing note reviewed.   Constitutional:       General: She is not in acute distress.     Appearance: Normal appearance.   HENT:      Head: Normocephalic and atraumatic.      Nose: Nose normal. No congestion or rhinorrhea.   Eyes:      General: No scleral icterus.     Conjunctiva/sclera: Conjunctivae normal.   Neck:      Comments: No JVD   Cardiovascular:      Rate and Rhythm: Normal rate and regular rhythm.      Heart sounds: No murmur heard.     No friction rub.   Pulmonary:      Effort: Pulmonary effort is normal.      Breath sounds: Normal breath sounds. No wheezing or rales.   Abdominal:      General: Bowel sounds are normal.      Palpations: Abdomen is soft.      Tenderness: There is no abdominal tenderness. There is no guarding or rebound.   Musculoskeletal:         General: No tenderness.      Cervical back: Normal range of motion and neck supple.      Right lower leg: No edema.      Left lower leg: No edema.   Skin:     General: Skin is warm and dry.      Coloration: Skin is not pale.      Findings: No erythema.   Neurological:      General: No focal deficit present.      Mental Status: She is alert and oriented to person, place, and time.      Motor: No weakness.      Coordination: Coordination normal.   Psychiatric:         Mood and Affect: Mood normal.         Behavior: Behavior normal.         Thought Content: Thought content normal.         Procedures           ED Course  ED Course as of 06/06/24 1434   Thu Jun 06, 2024   1002 Have reviewed her records.  Looks like her ICU admission in March was for a sodium of 115. [DT]   1107 White blood cell count minimally elevated, procalcitonin elevated with normal lactate.  Clean-catch urine shows too numerous to count white cells.  Will  culture that.  Has been on Augmentin and now doxycycline.  Sodium is 135.  Blood cultures have been drawn.  Will give Rocephin and place her on Ceftin [DT]   1123 I spoke with her about findings [DT]      ED Course User Index  [DT] Ion Duran MD                                             Medical Decision Making  Please see course notes.  Ordered and interpreted multiple labs.  I reviewed and interpreted recent records regarding ICU admission in March.  Gave IV antibiotics    Problems Addressed:  Bacterial UTI: complicated acute illness or injury that poses a threat to life or bodily functions    Amount and/or Complexity of Data Reviewed  External Data Reviewed: labs and notes.  Labs: ordered. Decision-making details documented in ED Course.    Risk  OTC drugs.  Prescription drug management.        Final diagnoses:   Bacterial UTI       ED Disposition  ED Disposition       ED Disposition   Discharge    Condition   Stable    Comment   --               Curt Sewell DO  [address] [place] [address]  [phone]               Medication List        New Prescriptions      cefuroxime 500 MG tablet  Commonly known as: CEFTIN  Take 1 tablet by mouth 2 (Two) Times a Day.            Stop      doxycycline 100 MG capsule  Commonly known as: MONODOX               Where to Get Your Medications        These medications were sent to [facility]/pharmacy #[id number] - [place], [place] - [address] [facility] - [phone] [facility] [phone]   [address] [address]      Phone: [phone]   cefuroxime 500 MG tablet            Ion Duran MD  06/06/24 1313

## 2024-06-06 NOTE — Clinical Note
[facility] EMERGENCY DEPARTMENT  [address]  [facility] [address]  Phone: [phone]    Inez Ladd was seen and treated in our emergency department on 6/6/2024.  She may return to work on 06/08/2024.         Thank you for choosing [facility].    Ion Duran MD

## 2024-06-08 ENCOUNTER — TELEPHONE (OUTPATIENT)
Dept: EMERGENCY DEPT | Facility: HOSPITAL | Age: 69
End: 2024-06-08
Payer: MEDICARE

## 2024-06-08 LAB — BACTERIA SPEC AEROBE CULT: ABNORMAL

## 2024-06-08 RX ORDER — SULFAMETHOXAZOLE AND TRIMETHOPRIM 800; 160 MG/1; MG/1
1 TABLET ORAL 2 TIMES DAILY
Qty: 20 TABLET | Refills: 0 | Status: SHIPPED | OUTPATIENT
Start: 2024-06-08

## 2024-06-08 NOTE — TELEPHONE ENCOUNTER
At a good conversation with the patient.  She denies any flank pain.  Fever abdominal pain.  She was prescribed cephalosporin and was positive for ESBL urinary tract infection.  Susceptible to Macrobid and also Bactrim.  She tells me she does get episodes of chills and there was a reported fever which led her to the ER.  Macrobid would not cover Thierry so I think in this instance Bactrim is reasonable.  Renal functions looked okay.  She does not feel like she is return to the ER.  I did give her strict warning signs and symptoms of worsening condition.  She was thankful for the call.  She will follow-up with her doctor this week for repeat evaluation.

## 2024-06-11 ENCOUNTER — OFFICE VISIT (OUTPATIENT)
Dept: FAMILY MEDICINE CLINIC | Facility: CLINIC | Age: 69
End: 2024-06-11
Payer: MEDICARE

## 2024-06-11 VITALS
SYSTOLIC BLOOD PRESSURE: 122 MMHG | BODY MASS INDEX: 27.4 KG/M2 | DIASTOLIC BLOOD PRESSURE: 62 MMHG | WEIGHT: 185 LBS | OXYGEN SATURATION: 99 % | HEIGHT: 69 IN | HEART RATE: 88 BPM | TEMPERATURE: 98 F

## 2024-06-11 DIAGNOSIS — N39.0 URINARY TRACT INFECTION WITHOUT HEMATURIA, SITE UNSPECIFIED: Primary | ICD-10-CM

## 2024-06-11 DIAGNOSIS — G89.29 CHRONIC PAIN OF RIGHT KNEE: ICD-10-CM

## 2024-06-11 DIAGNOSIS — M25.561 CHRONIC PAIN OF RIGHT KNEE: ICD-10-CM

## 2024-06-11 LAB
BACTERIA SPEC AEROBE CULT: NORMAL
BACTERIA SPEC AEROBE CULT: NORMAL

## 2024-06-11 PROCEDURE — 1126F AMNT PAIN NOTED NONE PRSNT: CPT | Performed by: FAMILY MEDICINE

## 2024-06-11 PROCEDURE — 3078F DIAST BP <80 MM HG: CPT | Performed by: FAMILY MEDICINE

## 2024-06-11 PROCEDURE — 99213 OFFICE O/P EST LOW 20 MIN: CPT | Performed by: FAMILY MEDICINE

## 2024-06-11 PROCEDURE — 1159F MED LIST DOCD IN RCRD: CPT | Performed by: FAMILY MEDICINE

## 2024-06-11 PROCEDURE — 1160F RVW MEDS BY RX/DR IN RCRD: CPT | Performed by: FAMILY MEDICINE

## 2024-06-11 PROCEDURE — 3074F SYST BP LT 130 MM HG: CPT | Performed by: FAMILY MEDICINE

## 2024-06-11 NOTE — PROGRESS NOTES
Follow Up Office Visit      Patient Name: Inez Ladd  : 1955   MRN: [id number]     Chief Complaint:    Chief Complaint   Patient presents with    Urinary Tract Infection     Went to ER Th AM,  had UTI. On bactrim.       History of Present Illness: Inez Ladd is a 69 y.o. female who is here today to follow up with right knee pain.     Right knee - had steroid injections. Had prp. Is on meloxicam and tumeric. Still having pain. She can walk in the grass. Stairs make the pain worse. She is requesting to see Dr. Gould. She has seen balta.       Physical exam: no cva tenderness. No suprapubic tenderness.       Subjective        I have reviewed and the following portions of the patient's history were updated as appropriate: past family history, past medical history, past social history, past surgical history and problem list.    Medications:     Current Outpatient Medications:     acetaminophen (TYLENOL) 500 MG tablet, Take 1 tablet by mouth Every 6 (Six) Hours As Needed for Mild Pain., Disp: , Rfl:     amLODIPine (NORVASC) 5 MG tablet, Take 1 tablet by mouth Daily., Disp: 90 tablet, Rfl: 2    Diclofenac Sodium (VOLTAREN) 1 % gel gel, Apply 4 g topically to the appropriate area as directed 4 (Four) Times a Day As Needed (knee pain)., Disp: 100 g, Rfl: 1    estradiol (ESTRACE VAGINAL) 0.1 MG/GM vaginal cream, Insert 1 gm intravaginally weekly, Disp: 1 each, Rfl: 3    lisinopril (PRINIVIL,ZESTRIL) 5 MG tablet, Take 1 tablet by mouth Daily., Disp: 90 tablet, Rfl: 2    meloxicam (MOBIC) 15 MG tablet, Take 1 tablet by mouth Daily., Disp: , Rfl:     sulfamethoxazole-trimethoprim (BACTRIM DS,SEPTRA DS) 800-160 MG per tablet, Take 1 tablet by mouth 2 (Two) Times a Day., Disp: 20 tablet, Rfl: 0    Allergies:   No Known Allergies    Objective     Physical Exam: Please see above  Vital Signs:   Vitals:    24 1410   BP: 122/62   Pulse: 88   Temp: 98 °F (36.7 °C)   SpO2: 99%   Weight: 83.9 kg (185  "lb)   Height: 175.3 cm (69\")     Body mass index is 27.32 kg/m².          Assessment / Plan      Assessment/Plan:   Diagnoses and all orders for this visit:    1. Urinary tract infection without hematuria, site unspecified (Primary)    2. Chronic pain of right knee  -     Ambulatory Referral to Orthopedic Surgery    Patient requesting to see Dr. Gould for knee pain.  Patient has had PRP and steroid injections in the past.  Orthopedic surgeon had told her she needs knee replacement when she is ready.  Patient would like to discuss further treatment with Dr. Gould.    UTI resolving.  If patient calls back with worsening symptoms or recurrent symptoms then I would recommend repeat UA and culture.  Patient can call with any concerns.  Over the next month, I do not mind placing order for her if needed.    Follow Up:   No follow-ups on file.    Curt Sewell DO  [facility]   "

## 2024-06-26 ENCOUNTER — OFFICE VISIT (OUTPATIENT)
Age: 69
End: 2024-06-26
Payer: MEDICARE

## 2024-06-26 VITALS
SYSTOLIC BLOOD PRESSURE: 126 MMHG | DIASTOLIC BLOOD PRESSURE: 70 MMHG | BODY MASS INDEX: 28.19 KG/M2 | HEIGHT: 68 IN | WEIGHT: 186 LBS

## 2024-06-26 DIAGNOSIS — M25.561 RIGHT KNEE PAIN, UNSPECIFIED CHRONICITY: Primary | ICD-10-CM

## 2024-06-26 DIAGNOSIS — M17.11 PRIMARY OSTEOARTHRITIS OF RIGHT KNEE: ICD-10-CM

## 2024-06-26 DIAGNOSIS — M25.461 EFFUSION OF RIGHT KNEE: ICD-10-CM

## 2024-06-26 PROCEDURE — 99214 OFFICE O/P EST MOD 30 MIN: CPT | Performed by: STUDENT IN AN ORGANIZED HEALTH CARE EDUCATION/TRAINING PROGRAM

## 2024-06-26 PROCEDURE — 1160F RVW MEDS BY RX/DR IN RCRD: CPT | Performed by: STUDENT IN AN ORGANIZED HEALTH CARE EDUCATION/TRAINING PROGRAM

## 2024-06-26 PROCEDURE — 1159F MED LIST DOCD IN RCRD: CPT | Performed by: STUDENT IN AN ORGANIZED HEALTH CARE EDUCATION/TRAINING PROGRAM

## 2024-06-26 PROCEDURE — 3074F SYST BP LT 130 MM HG: CPT | Performed by: STUDENT IN AN ORGANIZED HEALTH CARE EDUCATION/TRAINING PROGRAM

## 2024-06-26 PROCEDURE — 3078F DIAST BP <80 MM HG: CPT | Performed by: STUDENT IN AN ORGANIZED HEALTH CARE EDUCATION/TRAINING PROGRAM

## 2024-06-26 NOTE — PROGRESS NOTES
[facility] Office Visit     Office Visit       Date: 06/26/2024   Patient Name: Inez Ladd  MRN: [id number]  YOB: 1955    Referring Physician: Curt Sewell DO     Chief Complaint:   Chief Complaint   Patient presents with    Right Knee - Pain       History of Present Illness:   Inez Ladd is a 69 y.o. female who presents with right knee pain for 7 month(s). Onset atraumatic and gradual in nature. Pain is localized to the entire knee (globally) and is a 5/10 on the pain scale. Pain is described as dull. Associated symptoms include pain. The pain is worse with climbing stairs; resting and pain medication and/or NSAID make it better. Previous treatments have included: NSAIDS since symptom onset. Although some transient relief was reported with these interventions, these conservative measures have failed and symptoms have persisted. The patient is limited in daily activities and has had a significant decrease in quality of life as a result. She denies fevers, chills, or constitutional symptoms.    Subjective   Review of Systems: Review of Systems   Musculoskeletal:  Positive for arthralgias.   All other systems reviewed and are negative.       I have reviewed the following portions of the patient's history:History of Present Illness and review of systems.    Past Medical History:   Past Medical History:   Diagnosis Date    Acute cystitis     Acute pharyngitis     Body aches     Cancer     skin     Cellulitis     Dysuria     H/O bladder infections     2 SINCE AUGUST, urinary tract infection    History of prolapse of bladder      Bladder disorder    Hypertension     Impacted cerumen     Impacted cerumen, unspecified ear     Infected insect bite of forearm     Menopause     Prophylactic immunotherapy     Separation of AC joint 2006    Right Shoulder       Past Surgical History:   Past Surgical History:   Procedure Laterality Date    CARPAL TUNNEL  RELEASE      KNEE SURGERY         Family History:   Family History   Problem Relation Age of Onset    Coronary artery disease Other     Coronary artery disease Mother     Hypertension Mother     Dementia Mother     No Known Problems Father     Heart disease Maternal Grandmother     Breast cancer Neg Hx     Ovarian cancer Neg Hx        Social History:   Social History     Socioeconomic History    Marital status:     Number of children: 2   Tobacco Use    Smoking status: Former     Current packs/day: 0.00     Average packs/day: 0.3 packs/day for 14.0 years (3.5 ttl pk-yrs)     Types: Cigarettes     Start date: 1984     Quit date: 1998     Years since quittin.5     Passive exposure: Past    Smokeless tobacco: Never   Vaping Use    Vaping status: Never Used   Substance and Sexual Activity    Alcohol use: Yes     Comment: Occasional alcohol use     Drug use: No    Sexual activity: Not Currently     Birth control/protection: None       Medications:   Current Outpatient Medications:     acetaminophen (TYLENOL) 500 MG tablet, Take 1 tablet by mouth Every 6 (Six) Hours As Needed for Mild Pain., Disp: , Rfl:     amLODIPine (NORVASC) 5 MG tablet, Take 1 tablet by mouth Daily., Disp: 90 tablet, Rfl: 2    Diclofenac Sodium (VOLTAREN) 1 % gel gel, Apply 4 g topically to the appropriate area as directed 4 (Four) Times a Day As Needed (knee pain)., Disp: 100 g, Rfl: 1    estradiol (ESTRACE VAGINAL) 0.1 MG/GM vaginal cream, Insert 1 gm intravaginally weekly, Disp: 1 each, Rfl: 3    meloxicam (MOBIC) 15 MG tablet, Take 1 tablet by mouth Daily., Disp: , Rfl:     lisinopril (PRINIVIL,ZESTRIL) 5 MG tablet, Take 1 tablet by mouth Daily. (Patient not taking: Reported on 2024), Disp: 90 tablet, Rfl: 2    sulfamethoxazole-trimethoprim (BACTRIM DS,SEPTRA DS) 800-160 MG per tablet, Take 1 tablet by mouth 2 (Two) Times a Day. (Patient not taking: Reported on 2024), Disp: 20 tablet, Rfl: 0    Allergies: No Known  "Allergies    I reviewed the patient's chief complaint, history of present illness, review of systems, past medical history, surgical history, family history, social history, medications and allergy list.     Objective    Vital Signs:   Vitals:    06/26/24 0819   BP: 126/70   Weight: 84.4 kg (186 lb)   Height: 172.7 cm (68\")     Body mass index is 28.28 kg/m².   BMI is >= 25 and <30. (Overweight) The following options were offered after discussion;: exercise counseling/recommendations and nutrition counseling/recommendations      Patient reports that she is a former smoker. She quit smoking in 1998.  She has not resumed smoking since that time.  This behavior was applauded and she was encouraged to continue in smoking cessation.  We will continue to monitor at subsequent visits.    Ortho Exam:  Skin: Right Lower Extremity: normal. Left Lower Extremity: normal.   Right knee exam:   There is swelling and effusion but no warmth or erythema of the knee.    The skin is clear.    Overall the alignment of the knee is varus   The patient has 5/5 strength with ankle plantar flexion, dorsiflexion, inversion, eversion, and great toe extension.    Sensation is grossly present to light touch in the superficial peroneal, deep peroneal, and tibial nerve distributions.    The dorsalis pedis pulse is 2+ and the foot is well perfused with brisk capillary refill.   Active ROM is 0° to 110°.   Passive ROM is 0° to 110°.   The knee shows no gross instability to varus/valgus stress testing, anterior/posterior drawer sign, and Lachman testing.    There is tenderness to palpation over the medial/lateral joint line. Patellar grind test is positive.   Hip ROM is full and painless.  Left knee exam:   Normal knee contour.   No evidence of swelling or effusion. Full range of motion.    Results Review:   Imaging Results (Last 24 Hours)       Procedure Component Value Units Date/Time    XR Knee 4+ View Right [[id number]] Resulted: 06/26/24 0843     " Updated: 06/26/24 0844    Narrative:      Indication: Right knee pain     Views: Weightbearing views of the knee are submitted.     Impression: There is no fracture subluxation or dislocation. The patella   sits normally in the trochlea. There are no acute findings. There is   severe narrowing of the patellofemoral and medial joint spaces with   osteophyte formation, subchondral sclerosis and mild varus deformity.    Comparison: Worsening of knee arthritis especially in the medial joint   space compared to images from 1/3/2022.              Procedures    Assessment / Plan    Assessment/Plan:   Diagnoses and all orders for this visit:    1. Right knee pain, unspecified chronicity (Primary)  -     XR Knee 4+ View Right    2. Primary osteoarthritis of right knee    3. Effusion of right knee      Right knee pain and swelling that has worsened over the last several years.  She has previously been diagnosed with knee arthritis and has had injections of both corticosteroid and viscosupplementation.  However, symptoms returned.  Reviewed her radiographs today that showed worsening medial arthritis in the setting of severe patellofemoral joint space narrowing.  She is currently taking meloxicam as well as a series of over-the-counter joint supplements.  I discussed nonoperative management options including viscosupplementation injections, cortisone injections, bracing to keep her active and provide stability, as well as the role for total knee arthroplasty.  She does not seem rate this time for surgery and I advised her to continue with these modalities and let me know when she is ready.  She has previously seen Dr. Chavez.      Previous imaging studies reviewed: 1/3/2022-radiographs of the right knee.    Previous documentation reviewed: 6/11/2024-office visit-Curt Sewell DO.  3/8/2022-office visit-Bradly Chavez MD.    Follow Up:   Return if symptoms worsen or fail to improve.      Harpal Gould MD  [facility] [place] [facility]

## 2024-07-09 DIAGNOSIS — I10 ESSENTIAL HYPERTENSION: ICD-10-CM

## 2024-07-09 RX ORDER — AMLODIPINE BESYLATE 5 MG/1
5 TABLET ORAL DAILY
Qty: 90 TABLET | Refills: 0 | Status: SHIPPED | OUTPATIENT
Start: 2024-07-09

## 2024-07-16 ENCOUNTER — OFFICE VISIT (OUTPATIENT)
Dept: FAMILY MEDICINE CLINIC | Facility: CLINIC | Age: 69
End: 2024-07-16
Payer: MEDICARE

## 2024-07-16 VITALS
HEIGHT: 68 IN | BODY MASS INDEX: 27.89 KG/M2 | SYSTOLIC BLOOD PRESSURE: 142 MMHG | HEART RATE: 85 BPM | TEMPERATURE: 98.2 F | OXYGEN SATURATION: 98 % | DIASTOLIC BLOOD PRESSURE: 76 MMHG | WEIGHT: 184 LBS

## 2024-07-16 DIAGNOSIS — I10 ESSENTIAL HYPERTENSION: Primary | ICD-10-CM

## 2024-07-16 DIAGNOSIS — M25.511 ACUTE PAIN OF RIGHT SHOULDER: ICD-10-CM

## 2024-07-16 PROCEDURE — 3078F DIAST BP <80 MM HG: CPT | Performed by: FAMILY MEDICINE

## 2024-07-16 PROCEDURE — 3077F SYST BP >= 140 MM HG: CPT | Performed by: FAMILY MEDICINE

## 2024-07-16 PROCEDURE — 1126F AMNT PAIN NOTED NONE PRSNT: CPT | Performed by: FAMILY MEDICINE

## 2024-07-16 PROCEDURE — 99214 OFFICE O/P EST MOD 30 MIN: CPT | Performed by: FAMILY MEDICINE

## 2024-07-16 NOTE — PROGRESS NOTES
"     Follow Up Office Visit      Patient Name: Inez Ladd  : 1955   MRN: [id number]     Chief Complaint:    Chief Complaint   Patient presents with    Shoulder Pain     Right shoulder.    Hypertension       History of Present Illness: Inez Ladd is a 69 y.o. female who is here today to follow up with dizziness that happened last couple weeks. She had a bad episode of dizziness a couple weeks ago at work. She got sweaty too. She decreased her meds of lisinopril and started drinking more fluids to hydrate. She says she increased her lisinopril back up to 5mg and bp has been controlled.    Right shoulder pain - she had an AC joint separation in . She says it healed without surgery. She has been able to go to work and do everything she needs. The last month her arm is weaker and she is \"guarding\" it. She tried grabbing a coworker that was falling and her arm was yanked. She says that seems to help th e pain.       Physical exam: Tenderness at the biceps groove and the tubercle.  Had some pain with flexion of shoulder above 100 degrees.  Scarf test did not provide a lot of pain reproduction but did cause some tightness in the posterior shoulder.  Patient had intact range of motion of right and left shoulders.      Subjective        I have reviewed and the following portions of the patient's history were updated as appropriate: past family history, past medical history, past social history, past surgical history and problem list.    Medications:     Current Outpatient Medications:     acetaminophen (TYLENOL) 500 MG tablet, Take 1 tablet by mouth Every 6 (Six) Hours As Needed for Mild Pain., Disp: , Rfl:     amLODIPine (NORVASC) 5 MG tablet, Take 1 tablet by mouth Daily., Disp: 90 tablet, Rfl: 0    Diclofenac Sodium (VOLTAREN) 1 % gel gel, Apply 4 g topically to the appropriate area as directed 4 (Four) Times a Day As Needed (knee pain)., Disp: 100 g, Rfl: 1    estradiol (ESTRACE VAGINAL) 0.1 MG/GM " "vaginal cream, Insert 1 gm intravaginally weekly, Disp: 1 each, Rfl: 3    lisinopril (PRINIVIL,ZESTRIL) 5 MG tablet, Take 1 tablet by mouth Daily., Disp: 90 tablet, Rfl: 2    meloxicam (MOBIC) 15 MG tablet, Take 1 tablet by mouth Daily., Disp: , Rfl:     Allergies:   No Known Allergies    Objective     Physical Exam: Please see above  Vital Signs:   Vitals:    07/16/24 1515   BP: 142/76   Pulse: 85   Temp: 98.2 °F (36.8 °C)   SpO2: 98%   Weight: 83.5 kg (184 lb)   Height: 172.7 cm (68\")     Body mass index is 27.98 kg/m².          Assessment / Plan      Assessment/Plan:   Diagnoses and all orders for this visit:    1. Essential hypertension (Primary)    2. Acute pain of right shoulder    Patient presents with signs symptoms consistent with biceps tendinitis and rotator cuff tendinitis.  I have been a component of impingement as well.  Recommend at home physical therapy for rotator cuff and biceps tendinitis.  Patient can call for referral if needed    Hypertension uncontrolled here but controlled at home.  Monitor blood pressure.  Patient can take 2.5 mg of lisinopril.  She should monitor her blood pressure at that dose and if uncontrolled increase it to the normal 5 mg tablet that she is on.  Patient had recently changed her blood pressure medication due to dizziness-patient was advised to come monitor for this dizziness.  Unsure of etiology of dizziness but could be medication related, dehydration, anxiety.  Will consider further workup if it recurs.      Discussed medication changes for hypertension as she had been taking a lower dose than recommended.  Were trying to increase dose back up to the 5 mg tablet that she is supposed to be on.  However, patient was given instructions on how to increase dose slowly to monitor for dizziness.    Follow Up:   Return if symptoms worsen or fail to improve.    Curt Sewell, DO  [facility]   "

## 2024-08-02 ENCOUNTER — TELEPHONE (OUTPATIENT)
Dept: FAMILY MEDICINE CLINIC | Facility: CLINIC | Age: 69
End: 2024-08-02
Payer: MEDICARE

## 2024-08-02 NOTE — TELEPHONE ENCOUNTER
I called and spoke to  to remind her of her pending Mammogram. She was driving and could not write down the scheduling phone number but said she would call the office to get that number next week.

## 2024-09-02 PROBLEM — R05.9 COUGH: Status: ACTIVE | Noted: 2024-09-02

## 2024-09-03 ENCOUNTER — OFFICE VISIT (OUTPATIENT)
Dept: FAMILY MEDICINE CLINIC | Facility: CLINIC | Age: 69
End: 2024-09-03
Payer: MEDICARE

## 2024-09-03 VITALS
SYSTOLIC BLOOD PRESSURE: 112 MMHG | HEIGHT: 68 IN | WEIGHT: 184.2 LBS | TEMPERATURE: 98.4 F | DIASTOLIC BLOOD PRESSURE: 82 MMHG | OXYGEN SATURATION: 97 % | BODY MASS INDEX: 27.92 KG/M2 | HEART RATE: 80 BPM

## 2024-09-03 DIAGNOSIS — R05.9 COUGH, UNSPECIFIED TYPE: Primary | ICD-10-CM

## 2024-09-03 DIAGNOSIS — R59.0 ANTERIOR CERVICAL ADENOPATHY: ICD-10-CM

## 2024-09-03 DIAGNOSIS — J02.9 EXUDATIVE PHARYNGITIS: ICD-10-CM

## 2024-09-03 LAB
EXPIRATION DATE: NORMAL
FLUAV AG NPH QL: NEGATIVE
FLUBV AG NPH QL: NEGATIVE
INTERNAL CONTROL: NORMAL
Lab: NORMAL
S PYO AG THROAT QL: NEGATIVE
SARS-COV-2 AG UPPER RESP QL IA.RAPID: NOT DETECTED

## 2024-09-03 PROCEDURE — 87804 INFLUENZA ASSAY W/OPTIC: CPT | Performed by: FAMILY MEDICINE

## 2024-09-03 PROCEDURE — 3074F SYST BP LT 130 MM HG: CPT | Performed by: FAMILY MEDICINE

## 2024-09-03 PROCEDURE — 1160F RVW MEDS BY RX/DR IN RCRD: CPT | Performed by: FAMILY MEDICINE

## 2024-09-03 PROCEDURE — 99214 OFFICE O/P EST MOD 30 MIN: CPT | Performed by: FAMILY MEDICINE

## 2024-09-03 PROCEDURE — 1159F MED LIST DOCD IN RCRD: CPT | Performed by: FAMILY MEDICINE

## 2024-09-03 PROCEDURE — 87426 SARSCOV CORONAVIRUS AG IA: CPT | Performed by: FAMILY MEDICINE

## 2024-09-03 PROCEDURE — 1126F AMNT PAIN NOTED NONE PRSNT: CPT | Performed by: FAMILY MEDICINE

## 2024-09-03 PROCEDURE — 3079F DIAST BP 80-89 MM HG: CPT | Performed by: FAMILY MEDICINE

## 2024-09-03 PROCEDURE — 87880 STREP A ASSAY W/OPTIC: CPT | Performed by: FAMILY MEDICINE

## 2024-09-03 RX ORDER — AMOXICILLIN 875 MG
875 TABLET ORAL 2 TIMES DAILY
Qty: 14 TABLET | Refills: 0 | Status: SHIPPED | OUTPATIENT
Start: 2024-09-03 | End: 2024-09-10

## 2024-09-03 NOTE — PROGRESS NOTES
Follow Up Office Visit      Date: 2024   Patient Name: Inez Ladd  : 1955   MRN: [id number]     Chief Complaint:    Chief Complaint   Patient presents with    Cough    Nasal Congestion    Sore Throat     X 3 days       History of Present Illness: Inez Ladd is a 69 y.o. female who presents today for evaluation of congestion, scratchy throat, losing voice.  Sees Dr. Sewell for PCP.  Onset of symptoms    Reports was seen at  yesterday.    Reports son with covid and coworker had strep and flu     Was given pred pack, flonase, cough syrup.    Patient reports her symptoms started yesterday with runny nose, and scratchy throat and cough.  Started losing voice yesterday.        Subjective      Review of Systems:   Review of Systems   Constitutional:  Negative for fever.   HENT:  Positive for rhinorrhea and sore throat.    Respiratory:  Positive for cough.    Gastrointestinal:  Negative for nausea and vomiting.       I have reviewed the patients family history, social history, past medical history, past surgical history and have updated it as appropriate.     Medications:     Current Outpatient Medications:     acetaminophen (TYLENOL) 500 MG tablet, Take 1 tablet by mouth Every 6 (Six) Hours As Needed for Mild Pain., Disp: , Rfl:     amLODIPine (NORVASC) 5 MG tablet, Take 1 tablet by mouth Daily., Disp: 90 tablet, Rfl: 0    Diclofenac Sodium (VOLTAREN) 1 % gel gel, Apply 4 g topically to the appropriate area as directed 4 (Four) Times a Day As Needed (knee pain)., Disp: 100 g, Rfl: 1    estradiol (ESTRACE VAGINAL) 0.1 MG/GM vaginal cream, Insert 1 gm intravaginally weekly, Disp: 1 each, Rfl: 3    fluticasone (FLONASE) 50 MCG/ACT nasal spray, 2 sprays into the nostril(s) as directed by provider Daily., Disp: 9.9 mL, Rfl: 0    lisinopril (PRINIVIL,ZESTRIL) 5 MG tablet, Take 1 tablet by mouth Daily., Disp: 90 tablet, Rfl: 2    meloxicam (MOBIC) 15 MG tablet, Take 1 tablet by mouth Daily., Disp: ,  "Rfl:     methylPREDNISolone (MEDROL) 4 MG tablet, Take 3 tablets by mouth Daily for 3 days, THEN 2 tablets Daily for 3 days, THEN 1 tablet Daily for 4 days. With food, Disp: 19 tablet, Rfl: 0    promethazine-dextromethorphan (PROMETHAZINE-DM) 6.25-15 MG/5ML syrup, Take 5 mL by mouth 4 (Four) Times a Day As Needed for Cough. Caution sedation, Disp: 180 mL, Rfl: 0    amoxicillin (AMOXIL) 875 MG tablet, Take 1 tablet by mouth 2 (Two) Times a Day for 7 days., Disp: 14 tablet, Rfl: 0    Allergies:   No Known Allergies    Objective     Physical Exam: Please see above  Vital Signs:   Vitals:    09/03/24 1003   BP: 112/82   Pulse: 80   Temp: 98.4 °F (36.9 °C)   TempSrc: Temporal   SpO2: 97%   Weight: 83.6 kg (184 lb 3.2 oz)   Height: 172.3 cm (67.84\")   PainSc: 0-No pain     Body mass index is 28.14 kg/m².          Physical Exam  Vitals and nursing note reviewed.   Constitutional:       Appearance: Normal appearance.   HENT:      Head: Normocephalic and atraumatic.      Mouth/Throat:      Comments: Posterior pharynx with exudate.  Neck:      Vascular: No carotid bruit.   Cardiovascular:      Rate and Rhythm: Normal rate and regular rhythm.      Heart sounds: Normal heart sounds. No murmur heard.  Pulmonary:      Effort: Pulmonary effort is normal.      Breath sounds: Normal breath sounds.   Abdominal:      General: Bowel sounds are normal.      Palpations: Abdomen is soft. There is no mass.      Tenderness: There is no abdominal tenderness.   Musculoskeletal:      Right lower leg: No edema.      Left lower leg: No edema.   Lymphadenopathy:      Cervical: Cervical adenopathy (mild anterior cervical adenopathy.) present.   Skin:     Coloration: Skin is not jaundiced or pale.      Findings: No erythema.   Neurological:      Mental Status: She is alert. Mental status is at baseline.   Psychiatric:         Mood and Affect: Mood normal.         Behavior: Behavior normal.         Procedures    Results:   Labs:   TSH   Date Value " Ref Range Status   12/29/2020 0.298 0.270 - 4.200 uIU/mL Final        POCT Results (if applicable):   Results for orders placed or performed in visit on 09/03/24   POC Influenza A / B    Specimen: Swab   Result Value Ref Range    Rapid Influenza A Ag Negative Negative    Rapid Influenza B Ag Negative Negative    Internal Control Passed Passed    Lot Number [id number]     Expiration Date 12/12/25    POC Rapid Strep A    Specimen: Swab   Result Value Ref Range    Rapid Strep A Screen Negative Negative, VALID, INVALID, Not Performed    Internal Control Passed Passed    Lot Number [id number]     Expiration Date 11/30/26    POCT SARS-CoV-2 Antigen    Specimen: Nasopharynx; Swab   Result Value Ref Range    SARS Antigen Not Detected Not Detected, Presumptive Negative    Internal Control Passed Passed    Lot Number [id number]     Expiration Date 4/28/25        Imaging:   No valid procedures specified.     Measures:   Advanced Care Planning:   Patient does not have an advance directive, declines further assistance.    Smoking Cessation:   Does not smoke    Assessment / Plan      Assessment/Plan:     1. Cough, unspecified type  Testing negative for Strep A, Covid antigen, and Influenza A/B in office today.   POC Influenza A / B  - POC Rapid Strep A  - POCT SARS-CoV-2 Antigen    2. Exudative pharyngitis  2-3  Will prescribe Amoxicillin course.  Can finish out steroid course prescribed by .    - amoxicillin (AMOXIL) 875 MG tablet; Take 1 tablet by mouth 2 (Two) Times a Day for 7 days.  Dispense: 14 tablet; Refill: 0    3. Anterior cervical adenopathy  As above.    - amoxicillin (AMOXIL) 875 MG tablet; Take 1 tablet by mouth 2 (Two) Times a Day for 7 days.  Dispense: 14 tablet; Refill: 0      Vaccine Counseling:      Follow Up:   Return if symptoms worsen or fail to improve, or as scheduled with pcp..      August Estevez, DO [facility]

## 2024-09-03 NOTE — PATIENT INSTRUCTIONS
Amoxicillin course.  Can finish out steroid pack.  Tylenol if needed for fever.  OK to RTW if no fever for 24 hours without taking Tylenol or similar.

## 2024-09-25 DIAGNOSIS — I10 ESSENTIAL HYPERTENSION: ICD-10-CM

## 2024-09-25 RX ORDER — AMLODIPINE BESYLATE 5 MG/1
5 TABLET ORAL DAILY
Qty: 90 TABLET | Refills: 0 | Status: SHIPPED | OUTPATIENT
Start: 2024-09-25

## 2024-10-14 ENCOUNTER — TELEPHONE (OUTPATIENT)
Dept: FAMILY MEDICINE CLINIC | Facility: CLINIC | Age: 69
End: 2024-10-14
Payer: MEDICARE

## 2024-10-14 NOTE — TELEPHONE ENCOUNTER
"  Caller: Inez Ladd     Relationship: SELF    Best call back number: [phone]    What is your medical concern? POSSIBLE UTI PATIENT IS HAVING BURNING WHILE URINATING, CHILLS    How long has this issue been going on? SINCE SATURDAY [id number]    Is your provider already aware of this issue? YES    Have you been treated for this issue? YES    PATIENT IS ASKING IF SHE CAN DROP OFF A LAB SPECIMAN AS DR LANCASTER ADVISED HER THE \"NEXT TIME\" THIS HAPPENED, SHE COULD BRING BY A SAMPLE      "

## 2024-10-15 ENCOUNTER — OFFICE VISIT (OUTPATIENT)
Dept: FAMILY MEDICINE CLINIC | Facility: CLINIC | Age: 69
End: 2024-10-15
Payer: MEDICARE

## 2024-10-15 ENCOUNTER — TELEPHONE (OUTPATIENT)
Dept: FAMILY MEDICINE CLINIC | Facility: CLINIC | Age: 69
End: 2024-10-15

## 2024-10-15 VITALS
OXYGEN SATURATION: 98 % | HEART RATE: 94 BPM | DIASTOLIC BLOOD PRESSURE: 66 MMHG | HEIGHT: 68 IN | SYSTOLIC BLOOD PRESSURE: 122 MMHG | TEMPERATURE: 98.4 F | BODY MASS INDEX: 27.68 KG/M2 | WEIGHT: 182.6 LBS

## 2024-10-15 DIAGNOSIS — R05.1 ACUTE COUGH: Primary | ICD-10-CM

## 2024-10-15 DIAGNOSIS — R30.0 DYSURIA: ICD-10-CM

## 2024-10-15 DIAGNOSIS — H61.21 IMPACTED CERUMEN OF RIGHT EAR: ICD-10-CM

## 2024-10-15 DIAGNOSIS — R68.83 CHILLS: ICD-10-CM

## 2024-10-15 LAB
BACTERIA UR QL AUTO: ABNORMAL /HPF
BILIRUB BLD-MCNC: NEGATIVE MG/DL
BILIRUB UR QL STRIP: NEGATIVE
CLARITY UR: ABNORMAL
CLARITY, POC: ABNORMAL
COLOR UR: YELLOW
COLOR UR: YELLOW
EXPIRATION DATE: ABNORMAL
EXPIRATION DATE: NORMAL
EXPIRATION DATE: NORMAL
FLUAV AG NPH QL: NEGATIVE
FLUBV AG NPH QL: NEGATIVE
GLUCOSE UR STRIP-MCNC: NEGATIVE MG/DL
GLUCOSE UR STRIP-MCNC: NEGATIVE MG/DL
HGB UR QL STRIP.AUTO: ABNORMAL
HOLD SPECIMEN: NORMAL
HYALINE CASTS UR QL AUTO: ABNORMAL /LPF
INTERNAL CONTROL: NORMAL
INTERNAL CONTROL: NORMAL
KETONES UR QL STRIP: NEGATIVE
KETONES UR QL: NEGATIVE
LEUKOCYTE EST, POC: ABNORMAL
LEUKOCYTE ESTERASE UR QL STRIP.AUTO: ABNORMAL
Lab: ABNORMAL
Lab: NORMAL
Lab: NORMAL
NITRITE UR QL STRIP: POSITIVE
NITRITE UR-MCNC: NEGATIVE MG/ML
PH UR STRIP.AUTO: 6 [PH] (ref 5–8)
PH UR: 6 [PH] (ref 5–8)
PROT UR QL STRIP: ABNORMAL
PROT UR STRIP-MCNC: ABNORMAL MG/DL
RBC # UR STRIP: ABNORMAL /HPF
RBC # UR STRIP: ABNORMAL /UL
REF LAB TEST METHOD: ABNORMAL
SARS-COV-2 AG UPPER RESP QL IA.RAPID: NOT DETECTED
SP GR UR STRIP: 1.01 (ref 1–1.03)
SP GR UR: 1.01 (ref 1–1.03)
SQUAMOUS #/AREA URNS HPF: ABNORMAL /HPF
UROBILINOGEN UR QL STRIP: ABNORMAL
UROBILINOGEN UR QL: NORMAL
WBC # UR STRIP: ABNORMAL /HPF

## 2024-10-15 PROCEDURE — 87086 URINE CULTURE/COLONY COUNT: CPT | Performed by: FAMILY MEDICINE

## 2024-10-15 PROCEDURE — 81003 URINALYSIS AUTO W/O SCOPE: CPT | Performed by: FAMILY MEDICINE

## 2024-10-15 PROCEDURE — 87804 INFLUENZA ASSAY W/OPTIC: CPT | Performed by: FAMILY MEDICINE

## 2024-10-15 PROCEDURE — 81001 URINALYSIS AUTO W/SCOPE: CPT | Performed by: FAMILY MEDICINE

## 2024-10-15 PROCEDURE — 87088 URINE BACTERIA CULTURE: CPT | Performed by: FAMILY MEDICINE

## 2024-10-15 PROCEDURE — 99213 OFFICE O/P EST LOW 20 MIN: CPT | Performed by: FAMILY MEDICINE

## 2024-10-15 PROCEDURE — 3074F SYST BP LT 130 MM HG: CPT | Performed by: FAMILY MEDICINE

## 2024-10-15 PROCEDURE — 1126F AMNT PAIN NOTED NONE PRSNT: CPT | Performed by: FAMILY MEDICINE

## 2024-10-15 PROCEDURE — 87426 SARSCOV CORONAVIRUS AG IA: CPT | Performed by: FAMILY MEDICINE

## 2024-10-15 PROCEDURE — 69210 REMOVE IMPACTED EAR WAX UNI: CPT | Performed by: FAMILY MEDICINE

## 2024-10-15 PROCEDURE — 87186 SC STD MICRODIL/AGAR DIL: CPT | Performed by: FAMILY MEDICINE

## 2024-10-15 PROCEDURE — 3078F DIAST BP <80 MM HG: CPT | Performed by: FAMILY MEDICINE

## 2024-10-15 RX ORDER — SULFAMETHOXAZOLE/TRIMETHOPRIM 800-160 MG
1 TABLET ORAL 2 TIMES DAILY
Qty: 6 TABLET | Refills: 0 | Status: SHIPPED | OUTPATIENT
Start: 2024-10-15

## 2024-10-15 NOTE — TELEPHONE ENCOUNTER
Caller: Inez Ladd    Relationship: Self    Best call back number: [phone]     Which medication are you concerned about: LISINOPRIL AND ANTIBIOTIC    Who prescribed you this medication: SAMSON LANCASTER    When did you start taking this medication: 10/15/2024    What are your concerns: PHARMACY MENTION CONCERNS OF TAKING THE TWO MEDICATION TOGETHER AND PATIENT CALLING TO DISCUSS THE POSSIBLE SIDE EFFECTS OF A HEART ATTACK. PLEASE CALL TO DISCUSS    How long have you had these concerns: NA

## 2024-10-15 NOTE — TELEPHONE ENCOUNTER
PT CALLED BACK AND ASKED IF SHE CAN HAVE AMOXICILLIN CALLED IN AS WELL BECAUSE SHE HAS NOW LOST HER VOICE AS WELL. PLEASE CALL HER ASAP

## 2024-10-17 ENCOUNTER — OFFICE VISIT (OUTPATIENT)
Dept: FAMILY MEDICINE CLINIC | Facility: CLINIC | Age: 69
End: 2024-10-17
Payer: MEDICARE

## 2024-10-17 VITALS
OXYGEN SATURATION: 98 % | WEIGHT: 183.2 LBS | BODY MASS INDEX: 27.77 KG/M2 | HEIGHT: 68 IN | HEART RATE: 93 BPM | DIASTOLIC BLOOD PRESSURE: 58 MMHG | SYSTOLIC BLOOD PRESSURE: 106 MMHG | RESPIRATION RATE: 18 BRPM | TEMPERATURE: 98.4 F

## 2024-10-17 DIAGNOSIS — J04.0 LARYNGITIS: Primary | ICD-10-CM

## 2024-10-17 LAB — BACTERIA SPEC AEROBE CULT: ABNORMAL

## 2024-10-17 PROCEDURE — 99213 OFFICE O/P EST LOW 20 MIN: CPT | Performed by: FAMILY MEDICINE

## 2024-10-17 PROCEDURE — 3078F DIAST BP <80 MM HG: CPT | Performed by: FAMILY MEDICINE

## 2024-10-17 PROCEDURE — 3074F SYST BP LT 130 MM HG: CPT | Performed by: FAMILY MEDICINE

## 2024-10-17 PROCEDURE — 1126F AMNT PAIN NOTED NONE PRSNT: CPT | Performed by: FAMILY MEDICINE

## 2024-10-17 RX ORDER — METHYLPREDNISOLONE 4 MG
TABLET, DOSE PACK ORAL
Qty: 21 TABLET | Refills: 0 | Status: SHIPPED | OUTPATIENT
Start: 2024-10-17

## 2024-10-17 NOTE — PROGRESS NOTES
Follow Up Office Visit      Patient Name: Inez Ladd  : 1955   MRN: [id number]     Chief Complaint:    Chief Complaint   Patient presents with    Nasal Congestion    URI    Cough     Started on pass weekend.    Sore Throat       History of Present Illness: Inez Ladd is a 69 y.o. female who is here today to follow up with patient presents again after a few days when she was seen for URI and UTI.  Patient's UTI is being treated currently.  She is on Bactrim.  Started having upper respiratory tract infections over the last week and or so-over the past 5 days.  It has worsened and now she has laryngitis.  No fever       Physical exam: Lungs CTA bilaterally.  No rhonchi or rales noted.  Oropharynx with mild erythema.  No lymphadenopathy in the cervical region      Subjective        I have reviewed and the following portions of the patient's history were updated as appropriate: past family history, past medical history, past social history, past surgical history and problem list.    Medications:     Current Outpatient Medications:     acetaminophen (TYLENOL) 500 MG tablet, Take 1 tablet by mouth Every 6 (Six) Hours As Needed for Mild Pain., Disp: , Rfl:     amLODIPine (NORVASC) 5 MG tablet, Take 1 tablet by mouth Daily., Disp: 90 tablet, Rfl: 0    Diclofenac Sodium (VOLTAREN) 1 % gel gel, Apply 4 g topically to the appropriate area as directed 4 (Four) Times a Day As Needed (knee pain)., Disp: 100 g, Rfl: 1    estradiol (ESTRACE VAGINAL) 0.1 MG/GM vaginal cream, Insert 1 gm intravaginally weekly, Disp: 1 each, Rfl: 3    fluticasone (FLONASE) 50 MCG/ACT nasal spray, 2 sprays into the nostril(s) as directed by provider Daily., Disp: 9.9 mL, Rfl: 0    lisinopril (PRINIVIL,ZESTRIL) 5 MG tablet, Take 1 tablet by mouth Daily., Disp: 90 tablet, Rfl: 2    meloxicam (MOBIC) 15 MG tablet, Take 1 tablet by mouth Daily., Disp: , Rfl:     sulfamethoxazole-trimethoprim (Bactrim DS) 800-160 MG per tablet, Take 1  "tablet by mouth 2 (Two) Times a Day., Disp: 6 tablet, Rfl: 0    methylPREDNISolone (MEDROL) 4 MG dose pack, Take as directed on package instructions., Disp: 21 tablet, Rfl: 0    promethazine-dextromethorphan (PROMETHAZINE-DM) 6.25-15 MG/5ML syrup, Take 5 mL by mouth 4 (Four) Times a Day As Needed for Cough. Caution sedation (Patient not taking: Reported on 10/17/2024), Disp: 180 mL, Rfl: 0    Allergies:   No Known Allergies    Objective     Physical Exam: Please see above  Vital Signs:   Vitals:    10/17/24 0734   BP: 106/58   Pulse: 93   Resp: 18   Temp: 98.4 °F (36.9 °C)   TempSrc: Temporal   SpO2: 98%   Weight: 83.1 kg (183 lb 3.2 oz)   Height: 171.5 cm (67.52\")   PainSc: 0-No pain     Body mass index is 28.25 kg/m².          Assessment / Plan      Assessment/Plan:   Diagnoses and all orders for this visit:    1. Laryngitis (Primary)  -     methylPREDNISolone (MEDROL) 4 MG dose pack; Take as directed on package instructions.  Dispense: 21 tablet; Refill: 0    Patient presents with viral laryngitis, will treat with Medrol Dosepak to see if this will help with symptoms.  Recommend hot liquids, honey, humidifier.  Follow-up as needed-symptoms will likely last another week or so.  If patient does not start to feel better over the next 4 to 5 days, would consider antibiotic therapy.  Recommend stay off of work yesterday, today and tomorrow.  Consider going back to work Monday    Follow Up:   Return if symptoms worsen or fail to improve.    Curt Sewell, DO  [facility]   "

## 2024-11-11 ENCOUNTER — APPOINTMENT (OUTPATIENT)
Dept: CT IMAGING | Facility: HOSPITAL | Age: 69
End: 2024-11-11
Payer: MEDICARE

## 2024-11-11 ENCOUNTER — APPOINTMENT (OUTPATIENT)
Dept: GENERAL RADIOLOGY | Facility: HOSPITAL | Age: 69
End: 2024-11-11
Payer: MEDICARE

## 2024-11-11 ENCOUNTER — HOSPITAL ENCOUNTER (EMERGENCY)
Facility: HOSPITAL | Age: 69
Discharge: HOME | End: 2024-11-11
Attending: EMERGENCY MEDICINE | Admitting: EMERGENCY MEDICINE
Payer: MEDICARE

## 2024-11-11 ENCOUNTER — APPOINTMENT (OUTPATIENT)
Dept: MRI IMAGING | Facility: HOSPITAL | Age: 69
End: 2024-11-11
Payer: MEDICARE

## 2024-11-11 VITALS
BODY MASS INDEX: 27.74 KG/M2 | HEIGHT: 68 IN | RESPIRATION RATE: 18 BRPM | SYSTOLIC BLOOD PRESSURE: 129 MMHG | DIASTOLIC BLOOD PRESSURE: 69 MMHG | OXYGEN SATURATION: 98 % | WEIGHT: 183 LBS | HEART RATE: 76 BPM | TEMPERATURE: 97.8 F

## 2024-11-11 DIAGNOSIS — R42 ORTHOSTATIC DIZZINESS: ICD-10-CM

## 2024-11-11 DIAGNOSIS — R42 VERTIGO: Primary | ICD-10-CM

## 2024-11-11 LAB
ALBUMIN SERPL-MCNC: 4.3 G/DL (ref 3.5–5.2)
ALBUMIN/GLOB SERPL: 1.8 G/DL
ALP SERPL-CCNC: 81 U/L (ref 39–117)
ALT SERPL W P-5'-P-CCNC: 11 U/L (ref 1–33)
ANION GAP SERPL CALCULATED.3IONS-SCNC: 11 MMOL/L (ref 5–15)
APTT PPP: 36.6 SECONDS (ref 22–39)
AST SERPL-CCNC: 12 U/L (ref 1–32)
BACTERIA UR QL AUTO: ABNORMAL /HPF
BASOPHILS # BLD AUTO: 0.04 10*3/MM3 (ref 0–0.2)
BASOPHILS NFR BLD AUTO: 0.7 % (ref 0–1.5)
BILIRUB SERPL-MCNC: 0.3 MG/DL (ref 0–1.2)
BILIRUB UR QL STRIP: NEGATIVE
BUN BLDA-MCNC: 18 MG/DL (ref 8–26)
BUN SERPL-MCNC: 17 MG/DL (ref 8–23)
BUN/CREAT SERPL: 25.8 (ref 7–25)
CA-I BLDA-SCNC: 1.32 MMOL/L (ref 1.2–1.32)
CALCIUM SPEC-SCNC: 9.3 MG/DL (ref 8.6–10.5)
CHLORIDE BLDA-SCNC: 103 MMOL/L (ref 98–109)
CHLORIDE SERPL-SCNC: 103 MMOL/L (ref 98–107)
CLARITY UR: CLEAR
CO2 BLDA-SCNC: 23 MMOL/L (ref 24–29)
CO2 SERPL-SCNC: 24 MMOL/L (ref 22–29)
COLOR UR: YELLOW
CREAT BLDA-MCNC: 0.7 MG/DL (ref 0.6–1.3)
CREAT SERPL-MCNC: 0.66 MG/DL (ref 0.57–1)
DEPRECATED RDW RBC AUTO: 42.6 FL (ref 37–54)
EGFRCR SERPLBLD CKD-EPI 2021: 93.8 ML/MIN/1.73
EGFRCR SERPLBLD CKD-EPI 2021: 95.1 ML/MIN/1.73
EOSINOPHIL # BLD AUTO: 0.12 10*3/MM3 (ref 0–0.4)
EOSINOPHIL NFR BLD AUTO: 2.1 % (ref 0.3–6.2)
ERYTHROCYTE [DISTWIDTH] IN BLOOD BY AUTOMATED COUNT: 13.1 % (ref 12.3–15.4)
GLOBULIN UR ELPH-MCNC: 2.4 GM/DL
GLUCOSE BLDC GLUCOMTR-MCNC: 118 MG/DL (ref 70–130)
GLUCOSE SERPL-MCNC: 118 MG/DL (ref 65–99)
GLUCOSE UR STRIP-MCNC: NEGATIVE MG/DL
HCT VFR BLD AUTO: 40.6 % (ref 34–46.6)
HCT VFR BLDA CALC: 39 % (ref 38–51)
HGB BLD-MCNC: 12.8 G/DL (ref 12–15.9)
HGB BLDA-MCNC: 13.3 G/DL (ref 12–17)
HGB UR QL STRIP.AUTO: NEGATIVE
HOLD SPECIMEN: NORMAL
HYALINE CASTS UR QL AUTO: ABNORMAL /LPF
IMM GRANULOCYTES # BLD AUTO: 0.01 10*3/MM3 (ref 0–0.05)
IMM GRANULOCYTES NFR BLD AUTO: 0.2 % (ref 0–0.5)
INR PPP: 0.93 (ref 0.89–1.12)
KETONES UR QL STRIP: NEGATIVE
LEUKOCYTE ESTERASE UR QL STRIP.AUTO: ABNORMAL
LYMPHOCYTES # BLD AUTO: 1.39 10*3/MM3 (ref 0.7–3.1)
LYMPHOCYTES NFR BLD AUTO: 24.8 % (ref 19.6–45.3)
MAGNESIUM SERPL-MCNC: 2.1 MG/DL (ref 1.6–2.4)
MCH RBC QN AUTO: 28.6 PG (ref 26.6–33)
MCHC RBC AUTO-ENTMCNC: 31.5 G/DL (ref 31.5–35.7)
MCV RBC AUTO: 90.6 FL (ref 79–97)
MONOCYTES # BLD AUTO: 0.4 10*3/MM3 (ref 0.1–0.9)
MONOCYTES NFR BLD AUTO: 7.1 % (ref 5–12)
NEUTROPHILS NFR BLD AUTO: 3.64 10*3/MM3 (ref 1.7–7)
NEUTROPHILS NFR BLD AUTO: 65.1 % (ref 42.7–76)
NITRITE UR QL STRIP: NEGATIVE
NRBC BLD AUTO-RTO: 0 /100 WBC (ref 0–0.2)
PH UR STRIP.AUTO: 5.5 [PH] (ref 5–8)
PLATELET # BLD AUTO: 227 10*3/MM3 (ref 140–450)
PMV BLD AUTO: 9.8 FL (ref 6–12)
POTASSIUM BLDA-SCNC: 4 MMOL/L (ref 3.5–4.9)
POTASSIUM SERPL-SCNC: 4 MMOL/L (ref 3.5–5.2)
PROT SERPL-MCNC: 6.7 G/DL (ref 6–8.5)
PROT UR QL STRIP: NEGATIVE
PROTHROMBIN TIME: 12.6 SECONDS (ref 12.2–14.5)
RBC # BLD AUTO: 4.48 10*6/MM3 (ref 3.77–5.28)
RBC # UR STRIP: ABNORMAL /HPF
REF LAB TEST METHOD: ABNORMAL
SODIUM BLD-SCNC: 139 MMOL/L (ref 138–146)
SODIUM SERPL-SCNC: 138 MMOL/L (ref 136–145)
SP GR UR STRIP: 1.04 (ref 1–1.03)
SQUAMOUS #/AREA URNS HPF: ABNORMAL /HPF
TROPONIN T SERPL HS-MCNC: <6 NG/L
TSH SERPL DL<=0.05 MIU/L-ACNC: 0.41 UIU/ML (ref 0.27–4.2)
UROBILINOGEN UR QL STRIP: ABNORMAL
WBC # UR STRIP: ABNORMAL /HPF
WBC NRBC COR # BLD AUTO: 5.6 10*3/MM3 (ref 3.4–10.8)
WHOLE BLOOD HOLD COAG: NORMAL
WHOLE BLOOD HOLD SPECIMEN: NORMAL

## 2024-11-11 PROCEDURE — 70450 CT HEAD/BRAIN W/O DYE: CPT

## 2024-11-11 PROCEDURE — 83735 ASSAY OF MAGNESIUM: CPT | Performed by: EMERGENCY MEDICINE

## 2024-11-11 PROCEDURE — 70498 CT ANGIOGRAPHY NECK: CPT

## 2024-11-11 PROCEDURE — 0042T HC CT CEREBRAL PERFUSION W/WO CONTRAST: CPT

## 2024-11-11 PROCEDURE — 81001 URINALYSIS AUTO W/SCOPE: CPT | Performed by: EMERGENCY MEDICINE

## 2024-11-11 PROCEDURE — 85014 HEMATOCRIT: CPT

## 2024-11-11 PROCEDURE — 71045 X-RAY EXAM CHEST 1 VIEW: CPT

## 2024-11-11 PROCEDURE — 99285 EMERGENCY DEPT VISIT HI MDM: CPT

## 2024-11-11 PROCEDURE — 85730 THROMBOPLASTIN TIME PARTIAL: CPT | Performed by: NURSE PRACTITIONER

## 2024-11-11 PROCEDURE — 25510000001 IOPAMIDOL PER 1 ML: Performed by: EMERGENCY MEDICINE

## 2024-11-11 PROCEDURE — 25810000003 SODIUM CHLORIDE 0.9 % SOLUTION: Performed by: NURSE PRACTITIONER

## 2024-11-11 PROCEDURE — 80047 BASIC METABLC PNL IONIZED CA: CPT

## 2024-11-11 PROCEDURE — 84443 ASSAY THYROID STIM HORMONE: CPT | Performed by: NURSE PRACTITIONER

## 2024-11-11 PROCEDURE — 85025 COMPLETE CBC W/AUTO DIFF WBC: CPT | Performed by: EMERGENCY MEDICINE

## 2024-11-11 PROCEDURE — 70551 MRI BRAIN STEM W/O DYE: CPT

## 2024-11-11 PROCEDURE — 84484 ASSAY OF TROPONIN QUANT: CPT | Performed by: EMERGENCY MEDICINE

## 2024-11-11 PROCEDURE — 80053 COMPREHEN METABOLIC PANEL: CPT | Performed by: EMERGENCY MEDICINE

## 2024-11-11 PROCEDURE — 93005 ELECTROCARDIOGRAM TRACING: CPT | Performed by: EMERGENCY MEDICINE

## 2024-11-11 PROCEDURE — 99283 EMERGENCY DEPT VISIT LOW MDM: CPT

## 2024-11-11 PROCEDURE — 87086 URINE CULTURE/COLONY COUNT: CPT | Performed by: NURSE PRACTITIONER

## 2024-11-11 PROCEDURE — 85610 PROTHROMBIN TIME: CPT | Performed by: NURSE PRACTITIONER

## 2024-11-11 PROCEDURE — 70496 CT ANGIOGRAPHY HEAD: CPT

## 2024-11-11 RX ORDER — IOPAMIDOL 755 MG/ML
150 INJECTION, SOLUTION INTRAVASCULAR
Status: COMPLETED | OUTPATIENT
Start: 2024-11-11 | End: 2024-11-11

## 2024-11-11 RX ORDER — MECLIZINE HYDROCHLORIDE 25 MG/1
25 TABLET ORAL ONCE
Status: COMPLETED | OUTPATIENT
Start: 2024-11-11 | End: 2024-11-11

## 2024-11-11 RX ORDER — MECLIZINE HYDROCHLORIDE 25 MG/1
25 TABLET ORAL EVERY 6 HOURS PRN
Qty: 15 TABLET | Refills: 0 | Status: SHIPPED | OUTPATIENT
Start: 2024-11-11 | End: 2024-11-16

## 2024-11-11 RX ORDER — SODIUM CHLORIDE 0.9 % (FLUSH) 0.9 %
10 SYRINGE (ML) INJECTION AS NEEDED
Status: DISCONTINUED | OUTPATIENT
Start: 2024-11-11 | End: 2024-11-11 | Stop reason: HOSPADM

## 2024-11-11 RX ADMIN — IOPAMIDOL 120 ML: 755 INJECTION, SOLUTION INTRAVENOUS at 08:23

## 2024-11-11 RX ADMIN — SODIUM CHLORIDE 500 ML: 9 INJECTION, SOLUTION INTRAVENOUS at 09:58

## 2024-11-11 RX ADMIN — MECLIZINE HYDROCHLORIDE 25 MG: 25 TABLET ORAL at 09:57

## 2024-11-11 NOTE — CONSULTS
Stroke Consult Note    Patient Name: Inez Ladd   MRN: [id number]  Age: 69 y.o.  Sex: female  : 1955    Primary Care Physician: Curt Sewell DO  Referring Physician:  STEVE Smiley    TIME STROKE TEAM CALLED: 801 EST     TIME PATIENT SEEN: 805 EST    Handedness: Right  Race:      Chief Complaint/Reason for Consultation: dizziness, altered gait    HPI: Inez Ladd is a 69-year-old female with past medical history of hypertension and remote tobacco abuse presented to [facility] emergency department via private vehicle for dizziness and altered gait.  States she was normal when she went to bed last night at 9 PM.  She woke up this morning around 430 and noticed that she was dizzy.  She tells me the dizziness is worse with turning her head, closing her eyes and with walking.  She tells me that she has had this congestion with headaches recently due to the weather change.  CT head was negative for any acute intracranial abnormality.  She is not a candidate for IV thrombolytics as her last known well was last night.  She is not a candidate for neurosurgical intervention as there is no LVO on CT scan.      Last Known Normal Date/Time: 11/10/2024/  EST     Review of Systems   HENT:  Positive for sinus pressure.    Neurological:  Positive for dizziness and headaches. Negative for tremors, seizures, syncope, facial asymmetry, speech difficulty, weakness, light-headedness and numbness.   All other systems reviewed and are negative.     Past Medical History:   Diagnosis Date    Acute cystitis     Acute pharyngitis     Body aches     Cancer     skin     Cellulitis     Dysuria     H/O bladder infections     2 SINCE AUGUST, urinary tract infection    History of prolapse of bladder      Bladder disorder    Hypertension     Impacted cerumen     Impacted cerumen, unspecified ear     Infected insect bite of forearm     Menopause     Prophylactic immunotherapy     Separation of AC joint      Right Shoulder     Past Surgical History:   Procedure Laterality Date    CARPAL TUNNEL RELEASE      KNEE SURGERY       Family History   Problem Relation Age of Onset    Coronary artery disease Other     Coronary artery disease Mother     Hypertension Mother     Dementia Mother     No Known Problems Father     Heart disease Maternal Grandmother     Breast cancer Neg Hx     Ovarian cancer Neg Hx      Social History     Socioeconomic History    Marital status:     Number of children: 2   Tobacco Use    Smoking status: Former     Current packs/day: 0.00     Average packs/day: 0.3 packs/day for 14.0 years (3.5 ttl pk-yrs)     Types: Cigarettes     Start date: 1984     Quit date: 1998     Years since quittin.8     Passive exposure: Past    Smokeless tobacco: Never   Vaping Use    Vaping status: Never Used   Substance and Sexual Activity    Alcohol use: Yes     Comment: Occasional alcohol use     Drug use: No    Sexual activity: Not Currently     Birth control/protection: None     No Known Allergies  Prior to Admission medications    Medication Sig Start Date End Date Taking? Authorizing Provider   acetaminophen (TYLENOL) 500 MG tablet Take 1 tablet by mouth Every 6 (Six) Hours As Needed for Mild Pain.    Provider, MD Cait   amLODIPine (NORVASC) 5 MG tablet Take 1 tablet by mouth Daily. 24   Curt Sewell DO   Diclofenac Sodium (VOLTAREN) 1 % gel gel Apply 4 g topically to the appropriate area as directed 4 (Four) Times a Day As Needed (knee pain). 24   Curt Sewell DO   estradiol (ESTRACE VAGINAL) 0.1 MG/GM vaginal cream Insert 1 gm intravaginally weekly 24   Grace Tucker MD   fluticasone (FLONASE) 50 MCG/ACT nasal spray 2 sprays into the nostril(s) as directed by provider Daily. 24   Christen Franco PA-C   lisinopril (PRINIVIL,ZESTRIL) 5 MG tablet Take 1 tablet by mouth Daily. 24   Curt Sewell DO   meloxicam (MOBIC) 15 MG tablet Take 1 tablet by  mouth Daily. 4/27/24   Provider, MD Cait   methylPREDNISolone (MEDROL) 4 MG dose pack Take as directed on package instructions. 10/17/24   Curt Sewell DO   promethazine-dextromethorphan (PROMETHAZINE-DM) 6.25-15 MG/5ML syrup Take 5 mL by mouth 4 (Four) Times a Day As Needed for Cough. Caution sedation  Patient not taking: Reported on 10/17/2024 9/2/24   Christen Franco PA-C   sulfamethoxazole-trimethoprim (Bactrim DS) 800-160 MG per tablet Take 1 tablet by mouth 2 (Two) Times a Day. 10/15/24   Curt Sewell DO   hydroCHLOROthiazide (HYDRODIURIL) 12.5 MG tablet Take 1 tablet by mouth Daily. 1/14/22 1/15/22  Curt Sewell DO         Temp:  [97.8 °F (36.6 °C)] 97.8 °F (36.6 °C)  Heart Rate:  [98] 98  Resp:  [18] 18  BP: (148)/(72) 148/72  Neurological Exam  Mental Status  Alert. Recent and remote memory are intact. Speech is normal. Language is fluent with no aphasia. Attention and concentration are normal.    Cranial Nerves  CN II: Visual fields full to confrontation.  CN III, IV, VI: Extraocular movements intact bilaterally. Pupils equal round and reactive to light bilaterally.  CN V:  Right: Facial sensation is normal.  CN VII: Full and symmetric facial movement.  CN VIII: Wears hearing aids.  CN XII: Tongue midline without atrophy or fasciculations.    Motor  Normal muscle bulk throughout. Normal muscle tone.  All extremities 5/5.    Sensory  Light touch is normal in upper and lower extremities.     Coordination  Right: Finger-to-nose normal. Heel-to-shin normal.    Gait   Normal gait.Casual gait: Unsteady.      Physical Exam  Vitals and nursing note reviewed.   Constitutional:       General: She is not in acute distress.     Appearance: She is not ill-appearing or toxic-appearing.   HENT:      Head: Normocephalic and atraumatic.      Mouth/Throat:      Mouth: Mucous membranes are moist.   Eyes:      Extraocular Movements: Extraocular movements intact.      Pupils: Pupils are equal, round, and  reactive to light.   Cardiovascular:      Rate and Rhythm: Normal rate and regular rhythm.   Pulmonary:      Effort: Pulmonary effort is normal. No respiratory distress.   Musculoskeletal:      Cervical back: Normal range of motion.   Skin:     General: Skin is warm and dry.   Neurological:      General: No focal deficit present.      Mental Status: She is alert.      Cranial Nerves: No cranial nerve deficit.      Sensory: No sensory deficit.      Motor: No weakness.      Coordination: Coordination normal.      Gait: Gait normal.   Psychiatric:         Mood and Affect: Mood normal.         Speech: Speech normal.         Behavior: Behavior normal.         Acute Stroke Data    Thrombolytic Inclusion / Exclusion Criteria    Time: 08:19 EST  Person Administering Scale: STEVE Otto    Inclusion Criteria  [x]   18 years of age or greater   []   Onset of symptoms < 4.5 hours before beginning treatment (stroke onset = time patient was last seen well or without symptoms).   []   Diagnosis of acute ischemic stroke causing measurable disabling deficit (Complete Hemianopia, Any Aphasia, Visual or Sensory Extinction, Any weakness limiting sustained effort against gravity)   []   Any remaining deficit considered potentially disabling in view of patient and practitioner   Exclusion criteria (Do not proceed with Alteplase if any are checked under exclusion criteria)  [x]   Onset unknown or GREATER than 4.5 hours   []   ICH on CT/MRI   []   CT demonstrates hypodensity representing acute or subacute infarct   []   Significant head trauma or prior stroke in the previous 3 months   []   Symptoms suggestive of subarachnoid hemorrhage   []   History of un-ruptured intracranial aneurysm GREATER than 10 mm   []   Recent intracranial or intraspinal surgery within the last 3 months   []   Arterial puncture at a non-compressible site in the previous 7 days   []   Active internal bleeding   []   Acute bleeding tendency   []   Platelet  count LESS than 100,000 for known hematological diseases such as leukemia, thrombocytopenia or chronic cirrhosis   []   Current use of anticoagulant with INR GREATER than 1.7 or PT GREATER than 15 seconds, aPTT GREATER than 40 seconds   []   Heparin received within 48 hours, resulting in abnormally elevated aPTT GREATER than upper limit of normal   []   Current use of direct thrombin inhibitors or direct factor Xa inhibitors in the past 48 hours   []   Elevated blood pressure refractory to treatment (systolic GREATER than 185 mm/Hg or diastolic  GREATER than 110 mm/Hg   []   Suspected infective endocarditis and aortic arch dissection   []   Current use of therapeutic treatment dose of low-molecular-weight heparin (LMWH) within the previous 24 hours   []   Structural GI malignancy or bleed   Relative exclusion for all patients  []   Only minor non-disabling symptoms   []   Pregnancy   []   Seizure at onset with postictal residual neurological impairments   []   Major surgery or previous trauma within past 14 days   []   History of previous spontaneous ICH, intracranial neoplasm, or AV malformation   []   Postpartum (within previous 14 days)   []   Recent GI or urinary tract hemorrhage (within previous 21 days)   []   Recent acute MI (within previous 3 months)   []   History of un-ruptured intracranial aneurysm LESS than 10 mm   []   History of ruptured intracranial aneurysm   []   Blood glucose LESS than 50 mg/dL (2.7 mmol/L)   []   Dural puncture within the last 7 days   []   Known GREATER than 10 cerebral microbleeds   Additional exclusions for patients with symptoms onset between 3 and 4.5 hours.  []   Age > 80.   []   On any anticoagulants regardless of INR  >>> Warfarin (Coumadin), Heparin, Enoxaparin (Lovenox), fondaparinux (Arixtra), bivalirudin (Angiomax), Argatroban, dabigatran (Pradaxa), rivaroxaban (Xarelto), or apixaban (Eliquis)   []   Severe stroke (NIHSS > 25).   []   History of BOTH diabetes and  previous ischemic stroke.   []   The risks and benefits have been discussed with the patient or family related to the administration of IV thrombolytic therapy for stroke symptoms.   []   I have discussed and reviewed the patient's case and imaging with the attending prior to IV thrombolytic therapy.   N/a Time IV thrombolytic administered       Hospital Meds:  Scheduled-    Infusions-     PRNs-   sodium chloride    Functional Status Prior to Current Stroke/Ayden Score: 0    NIH Stroke Scale  Time: 08:19 EST  Person Administering Scale: STEVE Otto    Interval: baseline  1a. Level of Consciousness: 0-->Alert, keenly responsive  1b. LOC Questions: 0-->Answers both questions correctly  1c. LOC Commands: 0-->Performs both tasks correctly  2. Best Gaze: 0-->Normal  3. Visual: 0-->No visual loss  4. Facial Palsy: 0-->Normal symmetrical movements  5a. Motor Arm, Left: 0-->No drift, limb holds 90 (or 45) degrees for full 10 secs  5b. Motor Arm, Right: 0-->No drift, limb holds 90 (or 45) degrees for full 10 secs  6a. Motor Leg, Left: 0-->No drift, leg holds 30 degree position for full 5 secs  6b. Motor Leg, Right: 0-->No drift, leg holds 30 degree position for full 5 secs  7. Limb Ataxia: 0-->Absent  8. Sensory: 0-->Normal, no sensory loss  9. Best Language: 0-->No aphasia, normal  10. Dysarthria: 0-->Normal  11. Extinction and Inattention (formerly Neglect): 0-->No abnormality    Total (NIH Stroke Scale): 0      Results Reviewed:  I have personally reviewed current lab, radiology, and data.     CT CEREBRAL PERFUSION WITH & WITHOUT CONTRAST    Result Date: 11/11/2024  Impression: No CT perfusion evidence of infarct or ischemia. Electronically Signed: Martinez Willis MD  11/11/2024 8:37 AM EST  Workstation ID: [id number]    CT Head Without Contrast Stroke Protocol    Result Date: 11/11/2024  Impression: No evidence of acute intracranial hemorrhage or large territory infarct. Electronically Signed: Martinez Willis  MD  11/11/2024 8:34 AM EST  Workstation ID: [id number]   WBC   Date Value Ref Range Status   11/11/2024 5.60 3.40 - 10.80 10*3/mm3 Final     RBC   Date Value Ref Range Status   11/11/2024 4.48 3.77 - 5.28 10*6/mm3 Final     Hemoglobin   Date Value Ref Range Status   11/11/2024 13.3 12.0 - 17.0 g/dL Final     Comment:     Serial Number: [id number]:  [id number]   11/11/2024 12.8 12.0 - 15.9 g/dL Final     Hematocrit   Date Value Ref Range Status   11/11/2024 39 38 - 51 % Final   11/11/2024 40.6 34.0 - 46.6 % Final     MCV   Date Value Ref Range Status   11/11/2024 90.6 79.0 - 97.0 fL Final     MCH   Date Value Ref Range Status   11/11/2024 28.6 26.6 - 33.0 pg Final     MCHC   Date Value Ref Range Status   11/11/2024 31.5 31.5 - 35.7 g/dL Final     RDW   Date Value Ref Range Status   11/11/2024 13.1 12.3 - 15.4 % Final     RDW-SD   Date Value Ref Range Status   11/11/2024 42.6 37.0 - 54.0 fl Final     MPV   Date Value Ref Range Status   11/11/2024 9.8 6.0 - 12.0 fL Final     Platelets   Date Value Ref Range Status   11/11/2024 227 140 - 450 10*3/mm3 Final     Neutrophil %   Date Value Ref Range Status   11/11/2024 65.1 42.7 - 76.0 % Final     Lymphocyte %   Date Value Ref Range Status   11/11/2024 24.8 19.6 - 45.3 % Final     Monocyte %   Date Value Ref Range Status   11/11/2024 7.1 5.0 - 12.0 % Final     Eosinophil %   Date Value Ref Range Status   11/11/2024 2.1 0.3 - 6.2 % Final     Basophil %   Date Value Ref Range Status   11/11/2024 0.7 0.0 - 1.5 % Final     Immature Grans %   Date Value Ref Range Status   11/11/2024 0.2 0.0 - 0.5 % Final     Neutrophils, Absolute   Date Value Ref Range Status   11/11/2024 3.64 1.70 - 7.00 10*3/mm3 Final     Lymphocytes, Absolute   Date Value Ref Range Status   11/11/2024 1.39 0.70 - 3.10 10*3/mm3 Final     Monocytes, Absolute   Date Value Ref Range Status   11/11/2024 0.40 0.10 - 0.90 10*3/mm3 Final     Eosinophils, Absolute   Date Value Ref Range Status   11/11/2024 0.12 0.00 -  0.40 10*3/mm3 Final     Basophils, Absolute   Date Value Ref Range Status   11/11/2024 0.04 0.00 - 0.20 10*3/mm3 Final     Immature Grans, Absolute   Date Value Ref Range Status   11/11/2024 0.01 0.00 - 0.05 10*3/mm3 Final     nRBC   Date Value Ref Range Status   11/11/2024 0.0 0.0 - 0.2 /100 WBC Final     Lab Results   Component Value Date    GLUCOSE 118 (H) 11/11/2024    BUN 17 11/11/2024    CREATININE 0.70 11/11/2024     11/11/2024    K 4.0 11/11/2024     11/11/2024    CALCIUM 9.3 11/11/2024    PROTEINTOT 6.7 11/11/2024    ALBUMIN 4.3 11/11/2024    ALT 11 11/11/2024    AST 12 11/11/2024    ALKPHOS 81 11/11/2024    BILITOT 0.3 11/11/2024    GLOB 2.4 11/11/2024    AGRATIO 1.8 11/11/2024    BCR 25.8 (H) 11/11/2024    ANIONGAP 11.0 11/11/2024    EGFR 93.8 11/11/2024        Assessment/Plan:    This is a 69-year-old female with past medical history of hypertension and remote tobacco abuse presented to [facility] emergency department via private vehicle for dizziness and altered gait.  States she was normal when she went to bed last night at 9 PM.  She woke up this morning around 430 and noticed that she was dizzy.  She tells me the dizziness is worse with turning her head, closing her eyes and with walking.  She tells me that she has had this congestion with headaches recently due to the weather change.  CT head was negative for any acute intracranial abnormality.  CT perfusion and CTA head and neck were negative for any LVO or flow-limiting stenosis.  She is not a candidate for IV thrombolytics as her last known well was last night.  She is not a candidate for neurosurgical intervention as there is no LVO on CT scan.  MRI will be obtained while in the emergency department.  If MRI is negative plan will be for patient to be discharged.    Antiplatelet PTA: None  Anticoagulant PTA: None        Dizziness  -Orthostatics  -CT head was negative for any acute intracranial abnormality  -CTA head and  neck and CT perfusion were negative for any LVO or flow-limiting stenosis  -MRI pending  -Urinalysis pending    Plan of care discussed with patient and STEVE Javed.  Stroke team will follow for MRI results. please call with any questions or concerns.    STEVE Otto  November 11, 2024  08:19 EST     Addendum  11:59 AM    MRI is negative for evidence of acute ischemia, hemorrhage or mass effect.  Orthostatic vital signs were negative.  Suspect patient's symptoms are due to peripheral cause.  There is no indication for stroke neurology to continue to follow patient.

## 2024-11-11 NOTE — ED PROVIDER NOTES
EMERGENCY DEPARTMENT ENCOUNTER    Pt Name: Inez Ladd  MRN: [id number]  Pt :   1955  Room Number:  1616  Date of encounter:  2024  PCP: Curt Sewell DO  ED Provider: STEVE Zarate    Historian: Patient      HPI:  Chief Complaint: Dizziness        Context: Inez Ladd is a 69 y.o. female who presents to the ED c/o dizziness since this morning.  Patient woke up around 430 to get ready for work when started to experience severe dizziness.  Describes this is forward spinning sensation.  Symptoms are worse with movement.  She was off balance walking, was holding onto the walls this morning.  She took her antihypertensive, amlodipine but did not take lisinopril.  Reports history of hypertension, hard of hearing, and arthritis, takes meloxicam and over-the-counter supplements.  Works as a nurse in adult .  She had cerumen disimpaction couple of weeks ago with PCP office.      PAST MEDICAL HISTORY  Past Medical History:   Diagnosis Date    Acute cystitis     Acute pharyngitis     Body aches     Cancer     skin     Cellulitis     Dysuria     H/O bladder infections     2 SINCE AUGUST, urinary tract infection    History of prolapse of bladder      Bladder disorder    Hypertension     Impacted cerumen     Impacted cerumen, unspecified ear     Infected insect bite of forearm     Menopause     Prophylactic immunotherapy     Separation of AC joint     Right Shoulder         PAST SURGICAL HISTORY  Past Surgical History:   Procedure Laterality Date    CARPAL TUNNEL RELEASE      KNEE SURGERY           FAMILY HISTORY  Family History   Problem Relation Age of Onset    Coronary artery disease Other     Coronary artery disease Mother     Hypertension Mother     Dementia Mother     No Known Problems Father     Heart disease Maternal Grandmother     Breast cancer Neg Hx     Ovarian cancer Neg Hx          SOCIAL HISTORY  Social History     Socioeconomic History    Marital status:     Number  of children: 2   Tobacco Use    Smoking status: Former     Current packs/day: 0.00     Average packs/day: 0.3 packs/day for 14.0 years (3.5 ttl pk-yrs)     Types: Cigarettes     Start date: 1984     Quit date: 1998     Years since quittin.8     Passive exposure: Past    Smokeless tobacco: Never   Vaping Use    Vaping status: Never Used   Substance and Sexual Activity    Alcohol use: Yes     Comment: Occasional alcohol use     Drug use: No    Sexual activity: Not Currently     Birth control/protection: None         ALLERGIES  Patient has no known allergies.        REVIEW OF SYSTEMS  Review of Systems       All systems reviewed and negative except for those discussed in HPI.       PHYSICAL EXAM    I have reviewed the triage vital signs and nursing notes.    ED Triage Vitals [24 0742]   Temp Heart Rate Resp BP SpO2   97.8 °F (36.6 °C) 98 18 148/72 98 %      Temp src Heart Rate Source Patient Position BP Location FiO2 (%)   Oral Monitor Sitting Left arm --       Physical Exam  GENERAL:   Appears in no acute distress.  Anxious.  HENT: Nares patent.  Right tympanic membrane mildly erythematous, residual earwax in bilateral ear canals  EYES: No scleral icterus.  CV: Regular rhythm, regular rate.  RESPIRATORY: Normal effort.  No audible wheezes, rales or rhonchi.  ABDOMEN: Soft, nontender  MUSCULOSKELETAL: No deformities.   NEURO: Alert, moves all extremities, follows commands.  Mild ataxia, no nystagmus  SKIN: Warm, dry, no rash visualized.      LAB RESULTS  Recent Results (from the past 24 hours)   Comprehensive Metabolic Panel    Collection Time: 24  8:18 AM    Specimen: Blood   Result Value Ref Range    Glucose 118 (H) 65 - 99 mg/dL    BUN 17 8 - 23 mg/dL    Creatinine 0.66 0.57 - 1.00 mg/dL    Sodium 138 136 - 145 mmol/L    Potassium 4.0 3.5 - 5.2 mmol/L    Chloride 103 98 - 107 mmol/L    CO2 24.0 22.0 - 29.0 mmol/L    Calcium 9.3 8.6 - 10.5 mg/dL    Total Protein 6.7 6.0 - 8.5 g/dL    Albumin  4.3 3.5 - 5.2 g/dL    ALT (SGPT) 11 1 - 33 U/L    AST (SGOT) 12 1 - 32 U/L    Alkaline Phosphatase 81 39 - 117 U/L    Total Bilirubin 0.3 0.0 - 1.2 mg/dL    Globulin 2.4 gm/dL    A/G Ratio 1.8 g/dL    BUN/Creatinine Ratio 25.8 (H) 7.0 - 25.0    Anion Gap 11.0 5.0 - 15.0 mmol/L    eGFR 95.1 >60.0 mL/min/1.73   Single High Sensitivity Troponin T    Collection Time: 11/11/24  8:18 AM    Specimen: Blood   Result Value Ref Range    HS Troponin T <6 <14 ng/L   Magnesium    Collection Time: 11/11/24  8:18 AM    Specimen: Blood   Result Value Ref Range    Magnesium 2.1 1.6 - 2.4 mg/dL   Green Top (Gel)    Collection Time: 11/11/24  8:18 AM   Result Value Ref Range    Extra Tube Hold for add-ons.    Lavender Top    Collection Time: 11/11/24  8:18 AM   Result Value Ref Range    Extra Tube hold for add-on    Gold Top - SST    Collection Time: 11/11/24  8:18 AM   Result Value Ref Range    Extra Tube Hold for add-ons.    Gray Top    Collection Time: 11/11/24  8:18 AM   Result Value Ref Range    Extra Tube Hold for add-ons.    Light Blue Top    Collection Time: 11/11/24  8:18 AM   Result Value Ref Range    Extra Tube Hold for add-ons.    CBC Auto Differential    Collection Time: 11/11/24  8:18 AM    Specimen: Blood   Result Value Ref Range    WBC 5.60 3.40 - 10.80 10*3/mm3    RBC 4.48 3.77 - 5.28 10*6/mm3    Hemoglobin 12.8 12.0 - 15.9 g/dL    Hematocrit 40.6 34.0 - 46.6 %    MCV 90.6 79.0 - 97.0 fL    MCH 28.6 26.6 - 33.0 pg    MCHC 31.5 31.5 - 35.7 g/dL    RDW 13.1 12.3 - 15.4 %    RDW-SD 42.6 37.0 - 54.0 fl    MPV 9.8 6.0 - 12.0 fL    Platelets 227 140 - 450 10*3/mm3    Neutrophil % 65.1 42.7 - 76.0 %    Lymphocyte % 24.8 19.6 - 45.3 %    Monocyte % 7.1 5.0 - 12.0 %    Eosinophil % 2.1 0.3 - 6.2 %    Basophil % 0.7 0.0 - 1.5 %    Immature Grans % 0.2 0.0 - 0.5 %    Neutrophils, Absolute 3.64 1.70 - 7.00 10*3/mm3    Lymphocytes, Absolute 1.39 0.70 - 3.10 10*3/mm3    Monocytes, Absolute 0.40 0.10 - 0.90 10*3/mm3    Eosinophils,  Absolute 0.12 0.00 - 0.40 10*3/mm3    Basophils, Absolute 0.04 0.00 - 0.20 10*3/mm3    Immature Grans, Absolute 0.01 0.00 - 0.05 10*3/mm3    nRBC 0.0 0.0 - 0.2 /100 WBC   Protime-INR    Collection Time: 11/11/24  8:18 AM    Specimen: Blood   Result Value Ref Range    Protime 12.6 12.2 - 14.5 Seconds    INR 0.93 0.89 - 1.12   aPTT    Collection Time: 11/11/24  8:18 AM    Specimen: Blood   Result Value Ref Range    PTT 36.6 22.0 - 39.0 seconds   TSH Rfx On Abnormal To Free T4    Collection Time: 11/11/24  8:18 AM    Specimen: Blood   Result Value Ref Range    TSH 0.414 0.270 - 4.200 uIU/mL   POC CHEM 8    Collection Time: 11/11/24  8:22 AM    Specimen: Blood   Result Value Ref Range    Glucose 118 70 - 130 mg/dL    BUN 18 8 - 26 mg/dL    Creatinine 0.70 0.60 - 1.30 mg/dL    Sodium 139 138 - 146 mmol/L    POC Potassium 4.0 3.5 - 4.9 mmol/L    Chloride 103 98 - 109 mmol/L    Total CO2 23 (L) 24 - 29 mmol/L    Hemoglobin 13.3 12.0 - 17.0 g/dL    Hematocrit 39 38 - 51 %    Ionized Calcium 1.32 1.20 - 1.32 mmol/L    eGFR 93.8 >60.0 mL/min/1.73   ECG 12 Lead ED Triage Standing Order; Weak / Dizzy / AMS    Collection Time: 11/11/24  8:52 AM   Result Value Ref Range    QT Interval 384 ms   Urinalysis With Microscopic If Indicated (No Culture) - Urine, Clean Catch    Collection Time: 11/11/24  9:50 AM    Specimen: Urine, Clean Catch   Result Value Ref Range    Color, UA Yellow Yellow, Straw    Appearance, UA Clear Clear    pH, UA 5.5 5.0 - 8.0    Specific Gravity, UA 1.038 (H) 1.001 - 1.030    Glucose, UA Negative Negative    Ketones, UA Negative Negative    Bilirubin, UA Negative Negative    Blood, UA Negative Negative    Protein, UA Negative Negative    Leuk Esterase, UA Moderate (2+) (A) Negative    Nitrite, UA Negative Negative    Urobilinogen, UA 0.2 E.U./dL 0.2 - 1.0 E.U./dL   Urinalysis, Microscopic Only - Urine, Clean Catch    Collection Time: 11/11/24  9:50 AM    Specimen: Urine, Clean Catch   Result Value Ref Range     RBC, UA 0-2 None Seen, 0-2 /HPF    WBC, UA 6-10 (A) None Seen, 0-2 /HPF    Bacteria, UA None Seen None Seen, Trace /HPF    Squamous Epithelial Cells, UA 0-2 None Seen, 0-2 /HPF    Hyaline Casts, UA 0-6 0 - 6 /LPF    Methodology Automated Microscopy        If labs were ordered, I independently reviewed the results and considered them in treating the patient.        RADIOLOGY  MRI Brain Without Contrast    Result Date: 11/11/2024  MRI BRAIN WO CONTRAST Date of Exam: 11/11/2024 10:44 AM EST Indication: dizziness.  Comparison: CT earlier the same day. Technique:  Routine multiplanar/multisequence sequence images of the brain were obtained without contrast administration. Findings: No acute infarct is present on diffusion weighted sequences. Midline structures appear normal and the craniocervical junction is satisfactory. Age-related changes are present with minimal volume loss and mild typical T2 hyperintense subcortical and pontine white matter changes, nonspecific but favored to reflect sequela of chronic microvascular ischemia. There is otherwise no evidence of intracranial hemorrhage, mass or mass effect. The ventricles are normal in size and configuration. The orbits are normal. The paranasal sinuses are clear. Intracranial arterial flow voids appear maintained.     Impression: No evidence of acute ischemia, hemorrhage or mass effect. Some minimal typical chronic and age-related changes are present. Electronically Signed: Tobias Ruffin MD  11/11/2024 10:47 AM EST  Workstation ID: [id number]    CT Angiogram Neck    Result Date: 11/11/2024  CT ANGIOGRAM HEAD W AI ANALYSIS OF LVO, CT ANGIOGRAM NECK Date of Exam: 11/11/2024 8:16 AM EST Indication: dizziness Acute Stroke. Comparison: Same day CT head Technique: CTA of the head and neck was performed after the uneventful intravenous administration of 120 mL Isovue-370. Reconstructed coronal and sagittal images were also obtained. In addition, a 3-D volume rendered  image was created for interpretation.  Automated exposure control and iterative reconstruction methods were used. Findings: Anterior circulation: Common and internal carotid arteries are patent. Anterior cerebral arteries are patent. Anterior commuting artery is seen. Middle cerebral arteries are patent. No evidence of aneurysm. Posterior circulation: Vertebral arteries are patent. Left posterior inferior, bilateral anterior inferior and superior cerebellar arteries are seen. Basilar artery is patent. Fetal origin of the right posterior cerebral artery. Posterior cerebral arteries appear patent with mild narrowing of the distal right posterior cerebral artery. No evidence of aneurysm. Venous structures: Proximal internal jugular veins are grossly patent. Right dominant transverse sinus. Dural venous sinuses appear grossly patent. Central cerebral veins appear grossly patent. Bones: No evidence of fracture. Degenerative changes of the cervical spine most advanced at C3-4 with grade 1 anterolisthesis. There is mild canal stenosis at this level. Possible old right facet fracture at C3. Soft tissues: No abnormal brain parenchymal enhancement. Aerodigestive tract is grossly patent. No suspicious adenopathy. 1 cm left thyroid hypodense nodule for which no further follow-up is required. Visualized thoracic vessels are grossly patent. Lung apices demonstrate no suspicious pulmonary nodules.     Impression: No evidence of occlusion of the major arteries of the head and neck. Possible old right facet fracture at C3. Electronically Signed: Martinez Willis MD  11/11/2024 9:09 AM EST  Workstation ID: [id number]    CT Angiogram Head w AI Analysis of LVO    Result Date: 11/11/2024  CT ANGIOGRAM HEAD W AI ANALYSIS OF LVO, CT ANGIOGRAM NECK Date of Exam: 11/11/2024 8:16 AM EST Indication: dizziness Acute Stroke. Comparison: Same day CT head Technique: CTA of the head and neck was performed after the uneventful intravenous  administration of 120 mL Isovue-370. Reconstructed coronal and sagittal images were also obtained. In addition, a 3-D volume rendered image was created for interpretation.  Automated exposure control and iterative reconstruction methods were used. Findings: Anterior circulation: Common and internal carotid arteries are patent. Anterior cerebral arteries are patent. Anterior commuting artery is seen. Middle cerebral arteries are patent. No evidence of aneurysm. Posterior circulation: Vertebral arteries are patent. Left posterior inferior, bilateral anterior inferior and superior cerebellar arteries are seen. Basilar artery is patent. Fetal origin of the right posterior cerebral artery. Posterior cerebral arteries appear patent with mild narrowing of the distal right posterior cerebral artery. No evidence of aneurysm. Venous structures: Proximal internal jugular veins are grossly patent. Right dominant transverse sinus. Dural venous sinuses appear grossly patent. Central cerebral veins appear grossly patent. Bones: No evidence of fracture. Degenerative changes of the cervical spine most advanced at C3-4 with grade 1 anterolisthesis. There is mild canal stenosis at this level. Possible old right facet fracture at C3. Soft tissues: No abnormal brain parenchymal enhancement. Aerodigestive tract is grossly patent. No suspicious adenopathy. 1 cm left thyroid hypodense nodule for which no further follow-up is required. Visualized thoracic vessels are grossly patent. Lung apices demonstrate no suspicious pulmonary nodules.     Impression: No evidence of occlusion of the major arteries of the head and neck. Possible old right facet fracture at C3. Electronically Signed: Martinez Willis MD  11/11/2024 9:09 AM EST  Workstation ID: [id number]    XR Chest 1 View    Result Date: 11/11/2024  XR CHEST 1 VW Date of Exam: 11/11/2024 8:51 AM EST Indication: Weak/Dizzy/AMS triage protocol Comparison: 3/5/2024 chest radiograph,  12/31/2018 chest CT scan Findings: Patchy density in the left apex corresponds well to scarring seen in the left upper lobe in this location on 12/31/2018 CT scan. This appears a little increased today, the lungs elsewhere appear stable. Heart and vasculature appear normal in size. No effusion or pneumothorax is seen. Chronic right AC joint separation is noted, unchanged from 2018.     Impression: Patchy left upper lobe density in the region of previously noted scar, more apparent on today's study, whether due to different image technique, increased scarring, or other new, acute pulmonary disease. No new chest disease elsewhere. Electronically Signed: Sam Hollingsworth MD  11/11/2024 9:07 AM EST  Workstation ID: [id number]    CT CEREBRAL PERFUSION WITH & WITHOUT CONTRAST    Result Date: 11/11/2024  CT CEREBRAL PERFUSION W WO CONTRAST Date of Exam: 11/11/2024 8:16 AM EST Indication: dizziness.  Comparison: Same day CT head Technique: Axial CT images of the brain were obtained prior to and after the administration of 120 mL Isovue-370. Core blood volume, core blood flow, mean transit time, and Tmax images were obtained utilizing the Rapid software protocol. A limited CT angiogram of the head was also performed to measure the blood vessel density. The radiation dose reduction device was turned on for each scan per the ALARA (As Low as Reasonably Achievable) protocol. Findings: Symmetric and preserved CBV and CBF. No elevated Tmax.     Impression: No CT perfusion evidence of infarct or ischemia. Electronically Signed: Martinez Willsi MD  11/11/2024 8:37 AM EST  Workstation ID: [id number]    CT Head Without Contrast Stroke Protocol    Result Date: 11/11/2024  CT HEAD WO CONTRAST STROKE PROTOCOL Date of Exam: 11/11/2024 8:10 AM EST Indication: dizziness. Comparison: None available. Technique: Axial CT images were obtained of the head without contrast administration.  Reconstructed coronal images were also obtained. Automated  exposure control and iterative construction methods were used. Scan Time: 8:10 a.m. Results discussed with ED team at 8:18 a.m. Findings: Parenchyma:No acute intraparenchymal hemorrhage. No loss of gray-white differentiation to suggest large territory infarct. Mild parenchymal volume loss. No substantial white matter disease. No midline shift or herniation. Ventricles and extra axial spaces:Prominent ventricles and sulci secondary to volume loss. No extra axial fluid collection seen. Other:Orbits are grossly intact. Paranasal sinuses are clear. Mastoid air cells are clear. Calvarium is intact. Intracranial atherosclerotic calcification is present.     Impression: No evidence of acute intracranial hemorrhage or large territory infarct. Electronically Signed: Martinez Willis MD  11/11/2024 8:34 AM EST  Workstation ID: [id number]     I ordered and independently reviewed the above noted radiographic studies.            PROCEDURES    Procedures    ECG 12 Lead ED Triage Standing Order; Weak / Dizzy / AMS   Preliminary Result   Test Reason : ED Triage Standing Order~   Blood Pressure :   */*   mmHG   Vent. Rate :  77 BPM     Atrial Rate :  77 BPM      P-R Int : 130 ms          QRS Dur :  88 ms       QT Int : 384 ms       P-R-T Axes :  83  33  30 degrees     QTcB Int : 434 ms      Normal sinus rhythm   Normal ECG   When compared with ECG of 11-Nov-2024 08:52,   MANUAL COMPARISON REQUIRED PREVIOUS ECG IS INCOMPATIBLE      Referred By: ED MD           Confirmed By:           MEDICATIONS GIVEN IN ER    Medications   iopamidol (ISOVUE-370) 76 % injection 150 mL (120 mL Intravenous Given 11/11/24 0823)   meclizine (ANTIVERT) tablet 25 mg (25 mg Oral Given 11/11/24 0957)   sodium chloride 0.9 % bolus 500 mL (0 mL Intravenous Stopped 11/11/24 1139)         MEDICAL DECISION MAKING, PROGRESS, and CONSULTS    All labs, if obtained, have been independently reviewed by me.  All radiology studies, if obtained, have been reviewed by me  and the radiologist dictating the report.  All EKG's, if obtained, have been independently viewed and interpreted by me/my attending physician.      Discussion below represents my analysis of pertinent findings related to patient's condition, differential diagnosis, treatment plan and final disposition.This is a 69-year-old female who presents for evaluation of dizziness since this morning.  She woke up at 430 this morning to get ready for work when started to experience spinning sensation and abnormal gait.  On physical exam she was nontoxic-appearing, no focal deficits, she was slightly of balance ambulating, mild erythema to the right tympanic membrane, lab work was obtained and unremarkable, there was 6-8 white blood cells on UA, patient reports no dysuria, urine culture sent out.  Will treat if positive.  She was mildly orthostatic, received fluids.d.  Stroke originator consulted, stroke workup initiated, CT CT of the head, MRI of the brain shows no evidence of acute ischemia hemorrhage or mass effect, no occlusion of major arteries, chest x-ray significant for patchy left upper lobe density.  Patient reports no respiratory issues.  She had good improvement of the symptoms with meclizine.  She was able to ambulate without difficulty, she was  mildly dizzy but much improved Discharged home with prescription for meclizine, advised increase fluid intake, instructions for Epley maneuver provided, neurology follow-up referral placed.  Discussed return precautions for any worsening.                         Differential diagnosis:    Posterior stroke, central vertigo, peripheral vertigo, hypovolemia, anemia dehydration, orthostatic dizziness    Additional sources:    - Discussed/ obtained information from independent historians:      - External (non-ED) record review: 10/15/2024, office visit for dysuria, chronic incontinence, dry cough.    - Chronic or social conditions impacting care: Advanced age    - Shared  decision making: Patient      Orders placed during this visit:  Orders Placed This Encounter   Procedures    Urine Culture - Urine,    XR Chest 1 View    CT Head Without Contrast Stroke Protocol    CT Angiogram Neck    CT Angiogram Head w AI Analysis of LVO    CT CEREBRAL PERFUSION WITH & WITHOUT CONTRAST    MRI Brain Without Contrast    [place] Draw    Comprehensive Metabolic Panel    Single High Sensitivity Troponin T    Magnesium    Urinalysis With Microscopic If Indicated (No Culture) - Urine, Clean Catch    CBC Auto Differential    Protime-INR    aPTT    TSH Rfx On Abnormal To Free T4    Urinalysis, Microscopic Only - Urine, Clean Catch    Ambulatory Referral to Neurology    Undress & Gown    Continuous Pulse Oximetry    Vital Signs    Orthostatic Blood Pressure    Orthostatic Vitals (Blood Pressure & Heart Rate)    Please ambulate the patient, see how she feels  Nursing Communication    POC CHEM 8    ECG 12 Lead ED Triage Standing Order; Weak / Dizzy / AMS    CBC & Differential    Green Top (Gel)    Lavender Top    Gold Top - SST    Gray Top    Light Blue Top         Additional orders considered but not ordered:  EtOH, UDS    ED Course:    Consultants:      ED Course as of 11/11/24 1630   Mon Nov 11, 2024   0803 Spoke with stroke originator, advised to run a stroke [IR]   0855 STEVE Llamas  notified vessels look good on the CT, will proceed with MRI [IR]   1138 I reevaluated the patient, notified of negative MRI.  Patient states felt better after meclizine.   [IR]   1200 The patient was ambulated by the staff, she feels well, balanced, slightly dizzy but much improved. [IR]      ED Course User Index  [IR] Tegan Cortez, APRN              Shared Decision Making:  After my consideration of clinical presentation and any laboratory/radiology studies obtained, I discussed the findings with the patient/patient representative who is in agreement with the treatment plan and the final disposition.   Risks and  benefits of discharge and/or observation/admission were discussed.       AS OF 16:30 EST VITALS:    BP - 129/69  HR - 76  TEMP - 97.8 °F (36.6 °C) (Oral)  O2 SATS - 98%                  DIAGNOSIS  Final diagnoses:   Vertigo   Orthostatic dizziness         DISPOSITION  DISCHARGE    Patient discharged in stable condition.    Reviewed implications of results, diagnosis, meds, responsibility to follow up, warning signs and symptoms of possible worsening, potential complications and reasons to return to ER.    Patient/Family voiced understanding of above instructions.    Discussed plan for discharge, as there is no emergent indication for admission.  Pt/family is agreeable and understands need for follow up and possible repeat testing.  Pt/family is aware that discharge does not mean that nothing is wrong but that it indicates no emergency is currently present that requires admission and they must continue care with follow-up as given below or with a physician of their choice.     FOLLOW-UP  Curt Sewell DO  [address]  [place] [address]  [phone]          [facility] EMERGENCY DEPARTMENT  [address] [address] [phone]    If symptoms worsen         Medication List        New Prescriptions      meclizine 25 MG tablet  Commonly known as: ANTIVERT  Take 1 tablet by mouth Every 6 (Six) Hours As Needed for Dizziness for up to 5 days.               Where to Get Your Medications        These medications were sent to [facility]/pharmacy #[id number] - [place], [place] - [address] - [phone]  - [phone]   [address] [place] [id number]      Phone: [phone]   meclizine 25 MG tablet            Please note that portions of this document were completed with voice recognition software.     STEVE Zarate   11/11/24   16:30 EST        Tegan Cortez APRN  11/11/24 1630

## 2024-11-11 NOTE — Clinical Note
[facility] EMERGENCY DEPARTMENT  [address]  [facility] [address]  Phone: [phone]    Inez Ladd was seen and treated in our emergency department on 11/11/2024.  She may return to work on 11/14/2024.         Thank you for choosing [facility].    Kolton De Leon, DO

## 2024-11-12 ENCOUNTER — TELEPHONE (OUTPATIENT)
Dept: FAMILY MEDICINE CLINIC | Facility: CLINIC | Age: 69
End: 2024-11-12
Payer: MEDICARE

## 2024-11-12 NOTE — TELEPHONE ENCOUNTER
Patient notified of providers response message and verbalized understanding. Patient declined appointment with another provider, I have added her to the cancellation list.

## 2024-11-12 NOTE — TELEPHONE ENCOUNTER
Caller: Inez Ladd    Relationship: Self    Best call back number: [phone]    What medication are you requesting: ANTIBIOTIC AND STEROID    What are your current symptoms: SEE BELOW    If a prescription is needed, what is your preferred pharmacy and phone number: [id number] [address] [facility] [phone] [facility] [phone]      Additional notes:PT WAS IN [facility] ER ON 11/11/24 FOR DIZZINESS. SHE STATES THEY ASKED HER TO CALL PCP AND GET ON ANTIBIOTIC FOR POSSIBLE INNER EAR INFECTION OR LABRYINTHITIS.    SHE DOESN'T HAVE AN APPT UNTIL NEXT TUES 11/19/24.

## 2024-11-13 ENCOUNTER — OFFICE VISIT (OUTPATIENT)
Dept: FAMILY MEDICINE CLINIC | Facility: CLINIC | Age: 69
End: 2024-11-13
Payer: MEDICARE

## 2024-11-13 ENCOUNTER — TELEPHONE (OUTPATIENT)
Dept: FAMILY MEDICINE CLINIC | Facility: CLINIC | Age: 69
End: 2024-11-13

## 2024-11-13 VITALS
BODY MASS INDEX: 27.58 KG/M2 | OXYGEN SATURATION: 99 % | HEART RATE: 76 BPM | TEMPERATURE: 98.4 F | DIASTOLIC BLOOD PRESSURE: 66 MMHG | HEIGHT: 68 IN | WEIGHT: 182 LBS | SYSTOLIC BLOOD PRESSURE: 116 MMHG

## 2024-11-13 DIAGNOSIS — H81.13 BENIGN PAROXYSMAL POSITIONAL VERTIGO DUE TO BILATERAL VESTIBULAR DISORDER: Primary | ICD-10-CM

## 2024-11-13 LAB — BACTERIA SPEC AEROBE CULT: NO GROWTH

## 2024-11-13 PROCEDURE — 99213 OFFICE O/P EST LOW 20 MIN: CPT | Performed by: PHYSICIAN ASSISTANT

## 2024-11-13 PROCEDURE — 1159F MED LIST DOCD IN RCRD: CPT | Performed by: PHYSICIAN ASSISTANT

## 2024-11-13 PROCEDURE — 1160F RVW MEDS BY RX/DR IN RCRD: CPT | Performed by: PHYSICIAN ASSISTANT

## 2024-11-13 PROCEDURE — 3074F SYST BP LT 130 MM HG: CPT | Performed by: PHYSICIAN ASSISTANT

## 2024-11-13 PROCEDURE — 1126F AMNT PAIN NOTED NONE PRSNT: CPT | Performed by: PHYSICIAN ASSISTANT

## 2024-11-13 PROCEDURE — 3078F DIAST BP <80 MM HG: CPT | Performed by: PHYSICIAN ASSISTANT

## 2024-11-13 NOTE — PROGRESS NOTES
Follow Up Office Visit    Date: 2024   Patient Name: Inez Ladd  : 1955   MRN: [id number]     Chief Complaint:    Chief Complaint   Patient presents with    Dizziness     ER follow up.       History of Present Illness:   Inez Ladd is a 69 y.o. female.  History of Present Illness  The patient presents for evaluation of dizziness. She is accompanied by an adult female.    She experienced a dizzy spell on Monday morning, which led to an emergency room visit. She described the sensation as akin to being on a boat in the ocean, with difficulty maintaining balance and walking independently. Despite undergoing various scans and a brain MRI, the cause of her dizziness remains unclear. She was informed of a possible issue with ear crystals but was not given a definitive diagnosis.    Her symptoms improved slightly yesterday morning but worsened in the afternoon. Today, she reports feeling better. She was prescribed meclizine to be taken as needed, which she took yesterday and the day before, but not today. She is seeking advice on how to prevent future episodes.    She has been hesitant to use her hearing aids, fearing they may exacerbate her symptoms. She has been performing exercises for her ear crystals and taking vitamin D and magnesium supplements. She also uses Flonase daily and has been gargling.    Her job at [employer] requires her to bend over frequently, which she is concerned may trigger her dizziness. She missed work on Monday due to her symptoms. She reports feeling more stable today, able to take in her surroundings without focusing on a single point. However, she still experiences occasional instability when walking. She sleeps in a recliner to keep her head elevated.    A few weeks ago, she had ear wax removed by Dr. Sewell, which resulted in bleeding. She is unsure if this is related to her current symptoms. She has tried various treatments including antibiotics,  steroids, meclizine, and exercises. She is worried about missing more work and is seeking advice on whether she should stay home.    She reports slight blurriness in her vision and feels the need to focus more when reading or watching TV. She is currently taking amlodipine, meloxicam, and lisinopril and is wondering if she can take Claritin in addition to these medications.        Subjective    Review of systems:  Review of Systems     I have reviewed and the following portions of the patient's history were updated as appropriate: past family history, past medical history, past social history, past surgical history and problem list.    Medications:     Current Outpatient Medications:     acetaminophen (TYLENOL) 500 MG tablet, Take 1 tablet by mouth Every 6 (Six) Hours As Needed for Mild Pain., Disp: , Rfl:     amLODIPine (NORVASC) 5 MG tablet, Take 1 tablet by mouth Daily., Disp: 90 tablet, Rfl: 0    Diclofenac Sodium (VOLTAREN) 1 % gel gel, Apply 4 g topically to the appropriate area as directed 4 (Four) Times a Day As Needed (knee pain)., Disp: 100 g, Rfl: 1    estradiol (ESTRACE VAGINAL) 0.1 MG/GM vaginal cream, Insert 1 gm intravaginally weekly, Disp: 1 each, Rfl: 3    fluticasone (FLONASE) 50 MCG/ACT nasal spray, 2 sprays into the nostril(s) as directed by provider Daily., Disp: 9.9 mL, Rfl: 0    lisinopril (PRINIVIL,ZESTRIL) 5 MG tablet, Take 1 tablet by mouth Daily., Disp: 90 tablet, Rfl: 2    meloxicam (MOBIC) 15 MG tablet, Take 1 tablet by mouth Daily., Disp: , Rfl:     methylPREDNISolone (MEDROL) 4 MG dose pack, Take as directed on package instructions., Disp: 21 tablet, Rfl: 0    promethazine-dextromethorphan (PROMETHAZINE-DM) 6.25-15 MG/5ML syrup, Take 5 mL by mouth 4 (Four) Times a Day As Needed for Cough. Caution sedation, Disp: 180 mL, Rfl: 0    sulfamethoxazole-trimethoprim (Bactrim DS) 800-160 MG per tablet, Take 1 tablet by mouth 2 (Two) Times a Day., Disp: 6 tablet, Rfl: 0    Allergies:   No Known  "Allergies    Objective   Vital Signs:   Vitals:    11/13/24 1332   BP: 116/66   Pulse: 76   Temp: 98.4 °F (36.9 °C)   SpO2: 99%   Weight: 82.6 kg (182 lb)   Height: 172.7 cm (68\")   PainSc: 0-No pain     Body mass index is 27.67 kg/m².          Physical Exam:   Physical Exam  Vitals and nursing note reviewed.   Constitutional:       Appearance: Normal appearance.   HENT:      Head: Normocephalic and atraumatic.      Right Ear: Tympanic membrane and ear canal normal.      Left Ear: Tympanic membrane and ear canal normal.      Nose: No congestion or rhinorrhea.      Mouth/Throat:      Mouth: Mucous membranes are moist.      Pharynx: Oropharynx is clear. No posterior oropharyngeal erythema.   Cardiovascular:      Rate and Rhythm: Normal rate and regular rhythm.   Pulmonary:      Effort: Pulmonary effort is normal.      Breath sounds: Normal breath sounds. No decreased breath sounds, wheezing, rhonchi or rales.   Musculoskeletal:      Cervical back: Neck supple.      Right lower leg: No edema.      Left lower leg: No edema.   Lymphadenopathy:      Cervical: No cervical adenopathy.   Neurological:      General: No focal deficit present.      Mental Status: She is alert.      Cranial Nerves: No cranial nerve deficit.          Procedures     Assessment / Plan    Assessment/Plan:   Diagnoses and all orders for this visit:    1. Benign paroxysmal positional vertigo due to bilateral vestibular disorder (Primary)       Assessment & Plan  1. Benign Paroxysmal Positional Vertigo (BPPV).  Her MRI results were normal, with clear carotids and vessels in the head and neck as per the CT scan and MRI. The symptoms suggest benign paroxysmal positional vertigo, which is not dangerous but can cause significant discomfort. She was advised to take meclizine as needed for dizziness. Vestibular therapy was suggested as a potential treatment option if symptoms persist. She was instructed to move slowly, avoid bending over, and maintain " hydration. A work note was provided for the following day. She was advised to continue using Flonase daily, take 10 mg of Claritin once a day, and perform the recommended exercises. If symptoms persist or worsen by Friday, vestibular therapy will be initiated.        Follow Up:   No follow-ups on file.    Patient or patient representative verbalized consent for the use of Ambient Listening during the visit with  Beverly Benavidez PA-C for chart documentation. 11/24/2024  15:43 EST    Beverly Benavidez PA-C   [facility] [place]

## 2024-11-13 NOTE — TELEPHONE ENCOUNTER
Caller: Inez Ladd    Relationship to patient: Self    Best call back number: [phone]     Chief complaint: DIZZINESS/ E.R. FOLLOW UP    Type of visit: HOSPITAL FOLLOW UP    Requested date: 11/13/24     If rescheduling, when is the original appointment: 11/19/24     Additional notes:PATIENT IS WILLING TO SEE ANY PCP, SHE DOES NOT WANT TO WAIT UNTIL TUESDAY.

## 2024-11-14 LAB
QT INTERVAL: 384 MS
QTC INTERVAL: 434 MS

## 2024-12-31 DIAGNOSIS — I10 ESSENTIAL HYPERTENSION: ICD-10-CM

## 2024-12-31 RX ORDER — AMLODIPINE BESYLATE 5 MG/1
5 TABLET ORAL DAILY
Qty: 90 TABLET | Refills: 0 | Status: SHIPPED | OUTPATIENT
Start: 2024-12-31

## 2025-01-17 RX ORDER — MELOXICAM 15 MG/1
15 TABLET ORAL DAILY
Qty: 90 TABLET | Refills: 1 | Status: SHIPPED | OUTPATIENT
Start: 2025-01-17

## 2025-01-21 DIAGNOSIS — I10 ESSENTIAL HYPERTENSION: ICD-10-CM

## 2025-01-21 RX ORDER — LISINOPRIL 5 MG/1
5 TABLET ORAL DAILY
Qty: 90 TABLET | Refills: 0 | Status: SHIPPED | OUTPATIENT
Start: 2025-01-21

## 2025-02-01 PROCEDURE — 87086 URINE CULTURE/COLONY COUNT: CPT | Performed by: FAMILY MEDICINE

## 2025-03-31 DIAGNOSIS — I10 ESSENTIAL HYPERTENSION: ICD-10-CM

## 2025-03-31 RX ORDER — AMLODIPINE BESYLATE 5 MG/1
5 TABLET ORAL DAILY
Qty: 90 TABLET | Refills: 0 | Status: SHIPPED | OUTPATIENT
Start: 2025-03-31

## 2025-04-28 DIAGNOSIS — I10 ESSENTIAL HYPERTENSION: ICD-10-CM

## 2025-04-29 RX ORDER — LISINOPRIL 5 MG/1
5 TABLET ORAL DAILY
Qty: 90 TABLET | Refills: 3 | Status: SHIPPED | OUTPATIENT
Start: 2025-04-29

## 2025-06-24 ENCOUNTER — LAB (OUTPATIENT)
Dept: LAB | Facility: HOSPITAL | Age: 70
End: 2025-06-24
Payer: MEDICARE

## 2025-06-24 ENCOUNTER — OFFICE VISIT (OUTPATIENT)
Dept: FAMILY MEDICINE CLINIC | Facility: CLINIC | Age: 70
End: 2025-06-24
Payer: MEDICARE

## 2025-06-24 VITALS
WEIGHT: 185.8 LBS | OXYGEN SATURATION: 98 % | SYSTOLIC BLOOD PRESSURE: 122 MMHG | HEART RATE: 74 BPM | HEIGHT: 68 IN | TEMPERATURE: 98.4 F | BODY MASS INDEX: 28.16 KG/M2 | DIASTOLIC BLOOD PRESSURE: 64 MMHG

## 2025-06-24 DIAGNOSIS — E78.41 ELEVATED LIPOPROTEIN(A): ICD-10-CM

## 2025-06-24 DIAGNOSIS — Z13.820 ENCOUNTER FOR OSTEOPOROSIS SCREENING IN ASYMPTOMATIC POSTMENOPAUSAL PATIENT: ICD-10-CM

## 2025-06-24 DIAGNOSIS — Z78.0 ENCOUNTER FOR OSTEOPOROSIS SCREENING IN ASYMPTOMATIC POSTMENOPAUSAL PATIENT: ICD-10-CM

## 2025-06-24 DIAGNOSIS — I10 ESSENTIAL HYPERTENSION: ICD-10-CM

## 2025-06-24 DIAGNOSIS — Z23 IMMUNIZATION DUE: ICD-10-CM

## 2025-06-24 DIAGNOSIS — I10 ESSENTIAL HYPERTENSION: Primary | ICD-10-CM

## 2025-06-24 DIAGNOSIS — Z00.00 MEDICARE ANNUAL WELLNESS VISIT, SUBSEQUENT: ICD-10-CM

## 2025-06-24 DIAGNOSIS — B35.1 ONYCHOMYCOSIS: ICD-10-CM

## 2025-06-24 DIAGNOSIS — Z12.31 ENCOUNTER FOR SCREENING MAMMOGRAM FOR MALIGNANT NEOPLASM OF BREAST: ICD-10-CM

## 2025-06-24 DIAGNOSIS — R42 DIZZINESS: ICD-10-CM

## 2025-06-24 LAB
ALBUMIN SERPL-MCNC: 4.6 G/DL (ref 3.5–5.2)
ALBUMIN UR-MCNC: <1.2 MG/DL
ALBUMIN/GLOB SERPL: 1.7 G/DL
ALP SERPL-CCNC: 72 U/L (ref 39–117)
ALT SERPL W P-5'-P-CCNC: 12 U/L (ref 1–33)
ANION GAP SERPL CALCULATED.3IONS-SCNC: 11.8 MMOL/L (ref 5–15)
AST SERPL-CCNC: 16 U/L (ref 1–32)
BILIRUB SERPL-MCNC: 0.4 MG/DL (ref 0–1.2)
BUN SERPL-MCNC: 23 MG/DL (ref 8–23)
BUN/CREAT SERPL: 26.7 (ref 7–25)
CALCIUM SPEC-SCNC: 9.8 MG/DL (ref 8.6–10.5)
CHLORIDE SERPL-SCNC: 99 MMOL/L (ref 98–107)
CHOLEST SERPL-MCNC: 209 MG/DL (ref 0–200)
CO2 SERPL-SCNC: 25.2 MMOL/L (ref 22–29)
CREAT SERPL-MCNC: 0.86 MG/DL (ref 0.57–1)
CREAT UR-MCNC: 29.5 MG/DL
DEPRECATED RDW RBC AUTO: 39.9 FL (ref 37–54)
EGFRCR SERPLBLD CKD-EPI 2021: 72.8 ML/MIN/1.73
ERYTHROCYTE [DISTWIDTH] IN BLOOD BY AUTOMATED COUNT: 12.2 % (ref 12.3–15.4)
GLOBULIN UR ELPH-MCNC: 2.7 GM/DL
GLUCOSE SERPL-MCNC: 106 MG/DL (ref 65–99)
HCT VFR BLD AUTO: 42.3 % (ref 34–46.6)
HDLC SERPL-MCNC: 60 MG/DL (ref 40–60)
HGB BLD-MCNC: 13.8 G/DL (ref 12–15.9)
LDLC SERPL CALC-MCNC: 126 MG/DL (ref 0–100)
LDLC/HDLC SERPL: 2.04 {RATIO}
MCH RBC QN AUTO: 29.7 PG (ref 26.6–33)
MCHC RBC AUTO-ENTMCNC: 32.6 G/DL (ref 31.5–35.7)
MCV RBC AUTO: 91 FL (ref 79–97)
MICROALBUMIN/CREAT UR: NORMAL MG/G{CREAT}
PLATELET # BLD AUTO: 273 10*3/MM3 (ref 140–450)
PMV BLD AUTO: 10.4 FL (ref 6–12)
POTASSIUM SERPL-SCNC: 4.4 MMOL/L (ref 3.5–5.2)
PROT SERPL-MCNC: 7.3 G/DL (ref 6–8.5)
RBC # BLD AUTO: 4.65 10*6/MM3 (ref 3.77–5.28)
SODIUM SERPL-SCNC: 136 MMOL/L (ref 136–145)
TRIGL SERPL-MCNC: 133 MG/DL (ref 0–150)
TSH SERPL DL<=0.05 MIU/L-ACNC: 0.4 UIU/ML (ref 0.27–4.2)
VLDLC SERPL-MCNC: 23 MG/DL (ref 5–40)
WBC NRBC COR # BLD AUTO: 7.89 10*3/MM3 (ref 3.4–10.8)

## 2025-06-24 PROCEDURE — 3078F DIAST BP <80 MM HG: CPT | Performed by: FAMILY MEDICINE

## 2025-06-24 PROCEDURE — 1125F AMNT PAIN NOTED PAIN PRSNT: CPT | Performed by: FAMILY MEDICINE

## 2025-06-24 PROCEDURE — 80053 COMPREHEN METABOLIC PANEL: CPT

## 2025-06-24 PROCEDURE — 82043 UR ALBUMIN QUANTITATIVE: CPT

## 2025-06-24 PROCEDURE — 80061 LIPID PANEL: CPT

## 2025-06-24 PROCEDURE — 1170F FXNL STATUS ASSESSED: CPT | Performed by: FAMILY MEDICINE

## 2025-06-24 PROCEDURE — 3074F SYST BP LT 130 MM HG: CPT | Performed by: FAMILY MEDICINE

## 2025-06-24 PROCEDURE — 1159F MED LIST DOCD IN RCRD: CPT | Performed by: FAMILY MEDICINE

## 2025-06-24 PROCEDURE — 84443 ASSAY THYROID STIM HORMONE: CPT

## 2025-06-24 PROCEDURE — 82570 ASSAY OF URINE CREATININE: CPT

## 2025-06-24 PROCEDURE — 1160F RVW MEDS BY RX/DR IN RCRD: CPT | Performed by: FAMILY MEDICINE

## 2025-06-24 PROCEDURE — 85027 COMPLETE CBC AUTOMATED: CPT

## 2025-06-24 PROCEDURE — G0439 PPPS, SUBSEQ VISIT: HCPCS | Performed by: FAMILY MEDICINE

## 2025-06-24 RX ORDER — MECLIZINE HYDROCHLORIDE 25 MG/1
25 TABLET ORAL 3 TIMES DAILY PRN
COMMUNITY
End: 2025-06-24 | Stop reason: SDUPTHER

## 2025-06-24 RX ORDER — AMLODIPINE BESYLATE 5 MG/1
5 TABLET ORAL DAILY
Qty: 90 TABLET | Refills: 3 | Status: SHIPPED | OUTPATIENT
Start: 2025-06-24

## 2025-06-24 RX ORDER — FLUCONAZOLE 150 MG/1
150 TABLET ORAL WEEKLY
Qty: 4 TABLET | Refills: 5 | Status: SHIPPED | OUTPATIENT
Start: 2025-06-24

## 2025-06-24 RX ORDER — MECLIZINE HYDROCHLORIDE 25 MG/1
25 TABLET ORAL 3 TIMES DAILY PRN
Qty: 30 TABLET | Refills: 1 | Status: SHIPPED | OUTPATIENT
Start: 2025-06-24

## 2025-06-24 NOTE — PROGRESS NOTES
Subjective   The ABCs of the Annual Wellness Visit  Medicare Wellness Visit      Inez Ladd is a 70 y.o. patient who presents for a Medicare Wellness Visit.    The following portions of the patient's history were reviewed and   updated as appropriate: allergies, current medications, past family history, past medical history, past social history, past surgical history, and problem list.    Compared to one year ago, the patient's physical   health is the same.  Compared to one year ago, the patient's mental   health is the same.    Recent Hospitalizations:  She was not admitted to the hospital during the last year.     Current Medical Providers:  Patient Care Team:  Curt Sewell DO as PCP - General (Family Medicine)    Outpatient Medications Prior to Visit   Medication Sig Dispense Refill    Diclofenac Sodium (VOLTAREN) 1 % gel gel Apply 4 g topically to the appropriate area as directed 4 (Four) Times a Day As Needed (knee pain). 100 g 1    estradiol (ESTRACE VAGINAL) 0.1 MG/GM vaginal cream Insert 1 gm intravaginally weekly 1 each 3    lisinopril (PRINIVIL,ZESTRIL) 5 MG tablet Take 1 tablet by mouth Daily. 90 tablet 3    meloxicam (MOBIC) 15 MG tablet Take 1 tablet by mouth Daily. 90 tablet 1    amLODIPine (NORVASC) 5 MG tablet Take 1 tablet by mouth Daily. 90 tablet 0    meclizine (ANTIVERT) 25 MG tablet Take 1 tablet by mouth 3 (Three) Times a Day As Needed for Dizziness.       No facility-administered medications prior to visit.     No opioid medication identified on active medication list. I have reviewed chart for other potential  high risk medication/s and harmful drug interactions in the elderly.      Aspirin is not on active medication list.  Aspirin use is not indicated based on review of current medical condition/s. Risk of harm outweighs potential benefits.  .    Patient Active Problem List   Diagnosis    Calculus of parotid gland duct    Calculus of parotid gland duct    Menopause present     "Essential hypertension    Encounter for annual routine gynecological examination    Urge incontinence    Cystocele with uterine prolapse    Fitting and adjustment of pessary    Unspecified abnormal cytological findings in specimens from cervix uteri     Impacted cerumen of left ear    Acute pain of right knee    Osteoarthritis of left knee    Acute non-recurrent maxillary sinusitis    Dehydration    Hyponatremia    Influenza A    Diarrhea    Urethral prolapse    PMB (postmenopausal bleeding)    Ovarian mass, left    Cough     Advance Care Planning Advance Directive is not on file.  ACP discussion was held with the patient during this visit. Patient does not have an advance directive, information provided.            Objective   Vitals:    25 0741   BP: 122/64   Pulse: 74   Temp: 98.4 °F (36.9 °C)   TempSrc: Infrared   SpO2: 98%   Weight: 84.3 kg (185 lb 12.8 oz)   Height: 172.7 cm (68\")   PainSc: 5    PainLoc: Knee       Estimated body mass index is 28.25 kg/m² as calculated from the following:    Height as of this encounter: 172.7 cm (68\").    Weight as of this encounter: 84.3 kg (185 lb 12.8 oz).                Does the patient have evidence of cognitive impairment? No                                                                                              Health  Risk Assessment    Smoking Status:  Social History     Tobacco Use   Smoking Status Former    Current packs/day: 0.00    Average packs/day: 0.3 packs/day for 14.0 years (3.5 ttl pk-yrs)    Types: Cigarettes    Start date: 1984    Quit date: 1998    Years since quittin.4    Passive exposure: Past   Smokeless Tobacco Never     Alcohol Consumption:  Social History     Substance and Sexual Activity   Alcohol Use Yes    Comment: Occasional alcohol use        Fall Risk Screen  STEADI Fall Risk Assessment was completed, and patient is at MODERATE risk for falls. Assessment completed on:2025    Depression Screening   Little interest " or pleasure in doing things? Not at all   Feeling down, depressed, or hopeless? Not at all   PHQ-2 Total Score 0      Health Habits and Functional and Cognitive Screenin/24/2025     7:43 AM   Functional & Cognitive Status   Do you have difficulty preparing food and eating? No   Do you have difficulty bathing yourself, getting dressed or grooming yourself? No   Do you have difficulty using the toilet? No   Do you have difficulty moving around from place to place? No   Do you have trouble with steps or getting out of a bed or a chair? No   Current Diet Well Balanced Diet   Dental Exam Up to date   Eye Exam Up to date   Exercise (times per week) 0 times per week   Do you need help using the phone?  No   Are you deaf or do you have serious difficulty hearing?  Yes   Do you need help to go to places out of walking distance? No   Do you need help shopping? No   Do you need help preparing meals?  No   Do you need help with housework?  No   Do you need help with laundry? No   Do you need help taking your medications? No   Do you need help managing money? No   Do you ever drive or ride in a car without wearing a seat belt? No   Have you felt unusual fatigue (could be tiredness), stress, anger or loneliness in the last month? No   Who do you live with? Other   If you need help, do you have trouble finding someone available to you? No   Have you been bothered in the last four weeks by sexual problems? No   Do you have difficulty concentrating, remembering or making decisions? No           Age-appropriate Screening Schedule:  Refer to the list below for future screening recommendations based on patient's age, sex and/or medical conditions. Orders for these recommended tests are listed in the plan section. The patient has been provided with a written plan.    Health Maintenance List  Health Maintenance   Topic Date Due    DXA SCAN  Never done    TDAP/TD VACCINES (1 - Tdap) Never done    Pneumococcal Vaccine 50+ ( of 1  - PCV) Never done    MAMMOGRAM  04/22/2023    LIPID PANEL  05/28/2025    COVID-19 Vaccine (3 - 2024-25 season) 07/08/2025 (Originally 9/1/2024)    ZOSTER VACCINE (2 of 2) 07/08/2025 (Originally 10/28/2023)    INFLUENZA VACCINE  07/01/2025    ANNUAL WELLNESS VISIT  06/24/2026    COLORECTAL CANCER SCREENING  02/13/2029    HEPATITIS C SCREENING  Completed                                                                                                                                                CMS Preventative Services Quick Reference  Risk Factors Identified During Encounter  Immunizations Discussed/Encouraged: Prevnar 20 (Pneumococcal 20-valent conjugate)    The above risks/problems have been discussed with the patient.  Pertinent information has been shared with the patient in the After Visit Summary.  An After Visit Summary and PPPS were made available to the patient.    Follow Up:   Next Medicare Wellness visit to be scheduled in 1 year.     Assessment & Plan  Essential hypertension  Hypertension is stable and controlled  Continue current treatment regimen.  Blood pressure will be reassessed in 1 year.    Orders:    Microalbumin / Creatinine Urine Ratio - Urine, Clean Catch; Future    Comprehensive Metabolic Panel; Future    TSH Rfx On Abnormal To Free T4; Future    CBC (No Diff); Future    amLODIPine (NORVASC) 5 MG tablet; Take 1 tablet by mouth Daily.    Elevated lipoprotein(a)   Lipid abnormalities are stable    Plan:  Continue same medication/s without change.      Discussed medication dosage, use, side effects, and goals of treatment in detail.    Counseled patient on lifestyle modifications to help control hyperlipidemia.     Patient Treatment Goals:   LDL goal is under 100    Followup in 1 year.    Orders:    Lipid Panel; Future    Medicare annual wellness visit, subsequent         Encounter for osteoporosis screening in asymptomatic postmenopausal patient    Orders:    DEXA Bone Density Axial;  Future    Immunization due         Encounter for screening mammogram for malignant neoplasm of breast    Orders:    Mammo Screening Digital Tomosynthesis Bilateral With CAD; Future    Dizziness    Orders:    meclizine (ANTIVERT) 25 MG tablet; Take 1 tablet by mouth 3 (Three) Times a Day As Needed for Dizziness.    Onychomycosis    Orders:    fluconazole (Diflucan) 150 MG tablet; Take 1 tablet by mouth 1 (One) Time Per Week.         Follow Up:   Return in about 1 year (around 6/24/2026) for Medicare Wellness, Labs today.

## 2025-06-24 NOTE — ASSESSMENT & PLAN NOTE
Hypertension is stable and controlled  Continue current treatment regimen.  Blood pressure will be reassessed in 1 year.    Orders:    Microalbumin / Creatinine Urine Ratio - Urine, Clean Catch; Future    Comprehensive Metabolic Panel; Future    TSH Rfx On Abnormal To Free T4; Future    CBC (No Diff); Future    amLODIPine (NORVASC) 5 MG tablet; Take 1 tablet by mouth Daily.

## 2025-06-26 ENCOUNTER — RESULTS FOLLOW-UP (OUTPATIENT)
Dept: FAMILY MEDICINE CLINIC | Facility: CLINIC | Age: 70
End: 2025-06-26
Payer: MEDICARE

## 2025-07-01 ENCOUNTER — TELEPHONE (OUTPATIENT)
Dept: FAMILY MEDICINE CLINIC | Facility: CLINIC | Age: 70
End: 2025-07-01
Payer: MEDICARE

## 2025-07-01 DIAGNOSIS — E78.41 ELEVATED LIPOPROTEIN(A): Primary | ICD-10-CM

## 2025-07-01 RX ORDER — ATORVASTATIN CALCIUM 40 MG/1
40 TABLET, FILM COATED ORAL DAILY
Qty: 90 TABLET | Refills: 0 | Status: SHIPPED | OUTPATIENT
Start: 2025-07-01

## 2025-07-01 NOTE — TELEPHONE ENCOUNTER
Caller: Inez Ladd    Relationship: Self    Best call back number:   Telephone Information:   Mobile [phone]     What medication are you requesting: CHOLESTEROL MEDICATION    What are your current symptoms: ELEVATED CHOLESTEROL    How long have you been experiencing symptoms: UNKNOWN    Have you had these symptoms before:    [] Yes  [x] No    Have you been treated for these symptoms before:   [] Yes  [x] No    If a prescription is needed, what is your preferred pharmacy and phone number: [id number] - [place], [place] - [address] [place] [address] [phone] [facility] [phone]      Additional notes:  PATIENT STATED PER PCP HER LABS SHOWED ELEVATED CHOLESTEROL AND SHE WAS ADVISED TO CALL BACK TO REQUEST TO HAVE MEDICATION SENT IN FOR TREATMENT

## 2025-07-22 RX ORDER — MELOXICAM 15 MG/1
15 TABLET ORAL DAILY
Qty: 90 TABLET | Refills: 1 | Status: SHIPPED | OUTPATIENT
Start: 2025-07-22

## 2025-07-29 ENCOUNTER — HOSPITAL ENCOUNTER (OUTPATIENT)
Dept: MAMMOGRAPHY | Facility: HOSPITAL | Age: 70
Discharge: HOME | End: 2025-07-29
Admitting: FAMILY MEDICINE
Payer: MEDICARE

## 2025-07-29 ENCOUNTER — TELEPHONE (OUTPATIENT)
Dept: FAMILY MEDICINE CLINIC | Facility: CLINIC | Age: 70
End: 2025-07-29
Payer: MEDICARE

## 2025-07-29 DIAGNOSIS — Z12.31 ENCOUNTER FOR SCREENING MAMMOGRAM FOR MALIGNANT NEOPLASM OF BREAST: ICD-10-CM

## 2025-07-29 PROCEDURE — 77067 SCR MAMMO BI INCL CAD: CPT

## 2025-07-29 PROCEDURE — 77063 BREAST TOMOSYNTHESIS BI: CPT

## 2025-07-29 NOTE — TELEPHONE ENCOUNTER
Caller: Inez Ladd    Relationship: Self    Best call back number: [phone]     What is the best time to reach you: ANYTIME    Who are you requesting to speak with (clinical staff, provider,  specific staff member): CLINICAL    What was the call regarding: PATIENT WANTS TO KNOW IF THERE IS A SHOT THAT ONLY HAS THE TETANUS SHOT AND NOTHING ELSE ADDED.  PLEASE CALL.    Is it okay if the provider responds through xCloudhart: NO

## 2025-07-29 NOTE — TELEPHONE ENCOUNTER
Caller: Inez Ladd    Relationship: Self    Best call back number: [phone]     What is the best time to reach you: ANYTIME    Who are you requesting to speak with (clinical staff, provider,  specific staff member): CLINICAL    What was the call regarding: PATIENT STATES THAT ATORVASTIN IS CAUSING LEG CRAMPS, AND SHE HAS STOPPED TAKING THE MEDICATION A WEEK AGO.  IS THERE ANOTHER MEDICATION THAT CAN TREAT THE SAME SYMPTOMS?  PLEASE CALL.    Is it okay if the provider responds through MyChart: NO

## 2025-07-30 DIAGNOSIS — E78.41 ELEVATED LIPOPROTEIN(A): Primary | ICD-10-CM

## 2025-07-30 PROCEDURE — 77063 BREAST TOMOSYNTHESIS BI: CPT | Performed by: RADIOLOGY

## 2025-07-30 PROCEDURE — 77067 SCR MAMMO BI INCL CAD: CPT | Performed by: RADIOLOGY

## 2025-07-30 RX ORDER — ROSUVASTATIN CALCIUM 5 MG/1
5 TABLET, COATED ORAL DAILY
Qty: 90 TABLET | Refills: 0 | Status: SHIPPED | OUTPATIENT
Start: 2025-07-30